# Patient Record
Sex: MALE | Race: WHITE | NOT HISPANIC OR LATINO | Employment: FULL TIME | URBAN - METROPOLITAN AREA
[De-identification: names, ages, dates, MRNs, and addresses within clinical notes are randomized per-mention and may not be internally consistent; named-entity substitution may affect disease eponyms.]

---

## 2017-02-28 DIAGNOSIS — N40.1 BENIGN PROSTATIC HYPERPLASIA WITH URINARY OBSTRUCTION: ICD-10-CM

## 2017-02-28 DIAGNOSIS — E78.5 DYSLIPIDEMIA: Primary | ICD-10-CM

## 2017-02-28 DIAGNOSIS — N13.8 BENIGN PROSTATIC HYPERPLASIA WITH URINARY OBSTRUCTION: ICD-10-CM

## 2017-02-28 DIAGNOSIS — F34.1 DYSTHYMIA: ICD-10-CM

## 2017-02-28 LAB
CHOLEST SERPL-MCNC: 209 MG/DL (ref 0–200)
CHOLEST/HDLC SERPL: 4.02 {RATIO}
HDLC SERPL-MCNC: 52 MG/DL (ref 40–60)
LDLC SERPL CALC-MCNC: 114 MG/DL (ref 0–100)
TRIGL SERPL-MCNC: 215 MG/DL (ref 0–150)
VLDLC SERPL CALC-MCNC: 43 MG/DL (ref 5–40)

## 2017-04-15 DIAGNOSIS — F34.1 DYSTHYMIA: ICD-10-CM

## 2017-04-15 DIAGNOSIS — G47.00 INSOMNIA, UNSPECIFIED TYPE: ICD-10-CM

## 2017-04-17 RX ORDER — FLUOXETINE HYDROCHLORIDE 20 MG/1
CAPSULE ORAL
Qty: 90 CAPSULE | Refills: 0 | Status: SHIPPED | OUTPATIENT
Start: 2017-04-17 | End: 2017-07-16 | Stop reason: SDUPTHER

## 2017-04-17 RX ORDER — TRAZODONE HYDROCHLORIDE 50 MG/1
TABLET ORAL
Qty: 90 TABLET | Refills: 0 | Status: SHIPPED | OUTPATIENT
Start: 2017-04-17 | End: 2017-07-16 | Stop reason: SDUPTHER

## 2017-04-26 ENCOUNTER — TELEPHONE (OUTPATIENT)
Dept: SPORTS MEDICINE | Facility: CLINIC | Age: 47
End: 2017-04-26

## 2017-05-26 ENCOUNTER — OFFICE VISIT (OUTPATIENT)
Dept: SPORTS MEDICINE | Facility: CLINIC | Age: 47
End: 2017-05-26

## 2017-05-26 VITALS
SYSTOLIC BLOOD PRESSURE: 140 MMHG | OXYGEN SATURATION: 98 % | BODY MASS INDEX: 25.77 KG/M2 | HEIGHT: 69 IN | HEART RATE: 64 BPM | DIASTOLIC BLOOD PRESSURE: 92 MMHG | WEIGHT: 174 LBS

## 2017-05-26 DIAGNOSIS — R03.0 ELEVATED BLOOD PRESSURE (NOT HYPERTENSION): ICD-10-CM

## 2017-05-26 DIAGNOSIS — G47.00 INSOMNIA, UNSPECIFIED TYPE: ICD-10-CM

## 2017-05-26 DIAGNOSIS — E78.5 DYSLIPIDEMIA: Primary | ICD-10-CM

## 2017-05-26 DIAGNOSIS — F34.1 DYSTHYMIA: ICD-10-CM

## 2017-05-26 PROCEDURE — 99214 OFFICE O/P EST MOD 30 MIN: CPT | Performed by: FAMILY MEDICINE

## 2017-05-26 RX ORDER — ROSUVASTATIN CALCIUM 20 MG/1
20 TABLET, COATED ORAL DAILY
Qty: 90 TABLET | Refills: 1 | Status: SHIPPED | OUTPATIENT
Start: 2017-05-26 | End: 2018-02-01 | Stop reason: SDUPTHER

## 2017-07-16 DIAGNOSIS — G47.00 INSOMNIA, UNSPECIFIED TYPE: ICD-10-CM

## 2017-07-16 DIAGNOSIS — F34.1 DYSTHYMIA: ICD-10-CM

## 2017-07-17 RX ORDER — TRAZODONE HYDROCHLORIDE 50 MG/1
TABLET ORAL
Qty: 90 TABLET | Refills: 0 | Status: SHIPPED | OUTPATIENT
Start: 2017-07-17 | End: 2017-10-15 | Stop reason: SDUPTHER

## 2017-07-17 RX ORDER — FLUOXETINE HYDROCHLORIDE 20 MG/1
CAPSULE ORAL
Qty: 90 CAPSULE | Refills: 0 | Status: SHIPPED | OUTPATIENT
Start: 2017-07-17 | End: 2017-10-15 | Stop reason: SDUPTHER

## 2017-10-11 ENCOUNTER — OFFICE VISIT (OUTPATIENT)
Dept: SPORTS MEDICINE | Facility: CLINIC | Age: 47
End: 2017-10-11

## 2017-10-11 VITALS
HEART RATE: 58 BPM | WEIGHT: 181 LBS | TEMPERATURE: 98.5 F | HEIGHT: 69 IN | BODY MASS INDEX: 26.81 KG/M2 | SYSTOLIC BLOOD PRESSURE: 140 MMHG | DIASTOLIC BLOOD PRESSURE: 98 MMHG | OXYGEN SATURATION: 98 %

## 2017-10-11 DIAGNOSIS — R68.89 FLU-LIKE SYMPTOMS: Primary | ICD-10-CM

## 2017-10-11 DIAGNOSIS — J06.9 VIRAL URI: ICD-10-CM

## 2017-10-11 LAB
EXPIRATION DATE: NORMAL
FLUAV AG NPH QL: NORMAL
FLUBV AG NPH QL: NORMAL
INTERNAL CONTROL: NORMAL
Lab: NORMAL

## 2017-10-11 PROCEDURE — 87804 INFLUENZA ASSAY W/OPTIC: CPT | Performed by: FAMILY MEDICINE

## 2017-10-11 PROCEDURE — 99213 OFFICE O/P EST LOW 20 MIN: CPT | Performed by: FAMILY MEDICINE

## 2017-10-11 RX ORDER — TRAZODONE HYDROCHLORIDE 50 MG/1
50 TABLET ORAL
COMMUNITY
End: 2017-10-11

## 2017-10-11 NOTE — PROGRESS NOTES
"Pedro is a 46 y.o. year old male    Chief Complaint   Patient presents with   • Sore Throat     x4 days   • Generalized Body Aches   • Nausea   • Cough   • Fatigue       History of Present Illness   URI    This is a new problem. The current episode started in the past 7 days. The problem has been gradually improving. There has been no fever. Associated symptoms include congestion, coughing, nausea, rhinorrhea and a sore throat. Pertinent negatives include no abdominal pain, chest pain, headaches or rash. He has tried decongestant and acetaminophen for the symptoms. The treatment provided mild relief.        I have reviewed the patient's medical, family, and social history in detail and updated the computerized patient record.    Review of Systems   Constitutional: Negative for chills, fatigue and fever.   HENT: Positive for congestion, rhinorrhea and sore throat. Negative for sinus pressure.    Respiratory: Positive for cough. Negative for chest tightness and shortness of breath.    Cardiovascular: Negative for chest pain.   Gastrointestinal: Positive for nausea. Negative for abdominal pain.   Musculoskeletal: Negative for arthralgias.   Skin: Negative for rash.   Neurological: Negative for numbness and headaches.   All other systems reviewed and are negative.      /98  Pulse 58  Temp 98.5 °F (36.9 °C)  Ht 69\" (175.3 cm)  Wt 181 lb (82.1 kg)  SpO2 98%  BMI 26.73 kg/m2     Physical Exam   Constitutional: He is oriented to person, place, and time. He appears well-developed and well-nourished.   HENT:   Head: Normocephalic and atraumatic.   Right Ear: External ear normal.   Left Ear: External ear normal.   Nose: Nose normal.   Mouth/Throat: Oropharynx is clear and moist.   Eyes: EOM are normal.   Neck: Normal range of motion.   Cardiovascular: Normal rate and regular rhythm.    Pulmonary/Chest: Effort normal and breath sounds normal.   Neurological: He is alert and oriented to person, place, and time. "   Skin: Skin is warm and dry. No rash noted.   Psychiatric: He has a normal mood and affect. His behavior is normal.   Nursing note and vitals reviewed.       Diagnoses and all orders for this visit:    Flu-like symptoms    Viral URI    Other orders  -     Discontinue: traZODone (DESYREL) 50 MG tablet; Take 50 mg by mouth.      Discussed with patient that symptoms likely related to viral URI. Explained that antibiotics are not indicated at this time and to continue supportive measures. Keep hydrating. Can take OTC acetaminophen and Ibuprofen. If symptoms acutely worsen or persist past 7 days, return to office.    EMR Dragon/Transcription disclaimer:    Much of this encounter note is an electronic transcription/translation of spoken language to printed text.  The electronic translation of spoken language may permit erroneous, or at times, nonsensical words or phrases to be inadvertently transcribed.  Although I have reviewed the note for such errors some may still exist.

## 2017-10-15 DIAGNOSIS — G47.00 INSOMNIA, UNSPECIFIED TYPE: ICD-10-CM

## 2017-10-15 DIAGNOSIS — F34.1 DYSTHYMIA: ICD-10-CM

## 2017-10-16 RX ORDER — FLUOXETINE HYDROCHLORIDE 20 MG/1
CAPSULE ORAL
Qty: 90 CAPSULE | Refills: 0 | Status: SHIPPED | OUTPATIENT
Start: 2017-10-16 | End: 2018-01-14 | Stop reason: SDUPTHER

## 2017-10-16 RX ORDER — TRAZODONE HYDROCHLORIDE 50 MG/1
TABLET ORAL
Qty: 90 TABLET | Refills: 0 | Status: SHIPPED | OUTPATIENT
Start: 2017-10-16 | End: 2018-01-14 | Stop reason: SDUPTHER

## 2017-11-21 ENCOUNTER — OFFICE VISIT (OUTPATIENT)
Dept: SPORTS MEDICINE | Facility: CLINIC | Age: 47
End: 2017-11-21

## 2017-11-21 VITALS
WEIGHT: 179 LBS | OXYGEN SATURATION: 98 % | DIASTOLIC BLOOD PRESSURE: 64 MMHG | SYSTOLIC BLOOD PRESSURE: 116 MMHG | BODY MASS INDEX: 26.43 KG/M2 | HEART RATE: 71 BPM | TEMPERATURE: 98.2 F

## 2017-11-21 DIAGNOSIS — J01.00 SUBACUTE MAXILLARY SINUSITIS: Primary | ICD-10-CM

## 2017-11-21 DIAGNOSIS — Z00.00 PREVENTATIVE HEALTH CARE: Primary | ICD-10-CM

## 2017-11-21 DIAGNOSIS — Z00.00 PREVENTATIVE HEALTH CARE: ICD-10-CM

## 2017-11-21 DIAGNOSIS — R05.9 COUGH: ICD-10-CM

## 2017-11-21 LAB
ALBUMIN SERPL-MCNC: 4.6 G/DL (ref 3.5–5.2)
ALBUMIN/GLOB SERPL: 1.9 G/DL
ALP SERPL-CCNC: 53 U/L (ref 39–117)
ALT SERPL-CCNC: 49 U/L (ref 1–41)
APPEARANCE UR: CLEAR
AST SERPL-CCNC: 43 U/L (ref 1–40)
BILIRUB SERPL-MCNC: 0.3 MG/DL (ref 0.1–1.2)
BILIRUB UR QL STRIP: NEGATIVE
BUN SERPL-MCNC: 14 MG/DL (ref 6–20)
BUN/CREAT SERPL: 14.1 (ref 7–25)
CALCIUM SERPL-MCNC: 9.7 MG/DL (ref 8.6–10.5)
CHLORIDE SERPL-SCNC: 100 MMOL/L (ref 98–107)
CHOLEST SERPL-MCNC: 204 MG/DL (ref 0–200)
CHOLEST/HDLC SERPL: 3.52 {RATIO}
CO2 SERPL-SCNC: 26.9 MMOL/L (ref 22–29)
COLOR UR: YELLOW
CREAT SERPL-MCNC: 0.99 MG/DL (ref 0.76–1.27)
GFR SERPLBLD CREATININE-BSD FMLA CKD-EPI: 100 ML/MIN/1.73
GFR SERPLBLD CREATININE-BSD FMLA CKD-EPI: 82 ML/MIN/1.73
GLOBULIN SER CALC-MCNC: 2.4 GM/DL
GLUCOSE SERPL-MCNC: 105 MG/DL (ref 65–99)
GLUCOSE UR QL: NEGATIVE
HDLC SERPL-MCNC: 58 MG/DL (ref 40–60)
HGB UR QL STRIP: NEGATIVE
KETONES UR QL STRIP: NEGATIVE
LDLC SERPL CALC-MCNC: 128 MG/DL (ref 0–100)
LEUKOCYTE ESTERASE UR QL STRIP: NEGATIVE
NITRITE UR QL STRIP: NEGATIVE
PH UR STRIP: 7.5 [PH] (ref 5–8)
POTASSIUM SERPL-SCNC: 4.7 MMOL/L (ref 3.5–5.2)
PROT SERPL-MCNC: 7 G/DL (ref 6–8.5)
PROT UR QL STRIP: NEGATIVE
SODIUM SERPL-SCNC: 141 MMOL/L (ref 136–145)
SP GR UR: 1.01 (ref 1–1.03)
TRIGL SERPL-MCNC: 88 MG/DL (ref 0–150)
UROBILINOGEN UR STRIP-MCNC: NORMAL MG/DL
VLDLC SERPL CALC-MCNC: 17.6 MG/DL (ref 5–40)

## 2017-11-21 PROCEDURE — 71020 XR CHEST 2 VW: CPT | Performed by: FAMILY MEDICINE

## 2017-11-21 PROCEDURE — 99214 OFFICE O/P EST MOD 30 MIN: CPT | Performed by: FAMILY MEDICINE

## 2017-11-21 RX ORDER — METHYLPREDNISOLONE 4 MG/1
TABLET ORAL
Qty: 21 TABLET | Refills: 0 | Status: SHIPPED | OUTPATIENT
Start: 2017-11-21 | End: 2017-12-01

## 2017-11-21 RX ORDER — AMOXICILLIN AND CLAVULANATE POTASSIUM 875; 125 MG/1; MG/1
1 TABLET, FILM COATED ORAL 2 TIMES DAILY
Qty: 20 TABLET | Refills: 0 | Status: SHIPPED | OUTPATIENT
Start: 2017-11-21 | End: 2017-12-01

## 2017-11-21 RX ORDER — DEXTROMETHORPHAN HYDROBROMIDE AND PROMETHAZINE HYDROCHLORIDE 15; 6.25 MG/5ML; MG/5ML
5 SYRUP ORAL 4 TIMES DAILY PRN
Qty: 180 ML | Refills: 0 | Status: SHIPPED | OUTPATIENT
Start: 2017-11-21 | End: 2017-12-01

## 2017-11-21 NOTE — PROGRESS NOTES
Pedro is a 46 y.o. year old male    Chief Complaint   Patient presents with   • Cough     x1 month   • Breathing Problem       History of Present Illness   URI    This is a new problem. The current episode started more than 1 month ago. The problem has been unchanged. There has been no fever. Associated symptoms include congestion, coughing, sinus pain and a sore throat. Pertinent negatives include no abdominal pain, chest pain, headaches, rash or rhinorrhea. He has tried decongestant and NSAIDs (previous visit to  received ABX, cough Rx) for the symptoms. The treatment provided mild relief.        I have reviewed the patient's medical, family, and social history in detail and updated the computerized patient record.    Review of Systems   Constitutional: Negative for chills, fatigue and fever.   HENT: Positive for congestion, sinus pain and sore throat. Negative for rhinorrhea and sinus pressure.    Respiratory: Positive for cough. Negative for chest tightness and shortness of breath.    Cardiovascular: Negative for chest pain.   Gastrointestinal: Negative for abdominal pain.   Musculoskeletal: Negative for arthralgias.   Skin: Negative for rash.   Neurological: Negative for numbness and headaches.   All other systems reviewed and are negative.      /64  Pulse 71  Temp 98.2 °F (36.8 °C)  Wt 179 lb (81.2 kg)  SpO2 98%  BMI 26.43 kg/m2     Physical Exam   Constitutional: He is oriented to person, place, and time. He appears well-developed and well-nourished.   HENT:   Head: Normocephalic and atraumatic.   Right Ear: Hearing, tympanic membrane and external ear normal.   Left Ear: Hearing, tympanic membrane and external ear normal.   Nose: No mucosal edema or rhinorrhea. Right sinus exhibits maxillary sinus tenderness. Left sinus exhibits maxillary sinus tenderness.   Mouth/Throat: Oropharynx is clear and moist. No posterior oropharyngeal erythema. Tonsils are 0 on the right. Tonsils are 0 on the left. No  tonsillar exudate.   Eyes: Conjunctivae and EOM are normal.   Neck: Normal range of motion.   Cardiovascular: Normal rate and normal heart sounds.    Pulmonary/Chest: Effort normal and breath sounds normal.   Lymphadenopathy:     He has no cervical adenopathy.   Neurological: He is alert and oriented to person, place, and time.   Skin: Skin is warm and dry.   Psychiatric: He has a normal mood and affect. His behavior is normal.   Nursing note and vitals reviewed.    Chest X-Ray  Indication: Cough  Views: PA and Lateral    Findings:  Normal lung markings.  No pleural effusion.  Heart size and shape are normal.  Mediastinum is normal.  No visible rib fractures.   Overall, normal chest.    There were no previous studies available for comparison.       Diagnoses and all orders for this visit:    Subacute maxillary sinusitis  -     amoxicillin-clavulanate (AUGMENTIN) 875-125 MG per tablet; Take 1 tablet by mouth 2 (Two) Times a Day for 10 days.  -     promethazine-dextromethorphan (PROMETHAZINE-DM) 6.25-15 MG/5ML syrup; Take 5 mL by mouth 4 (Four) Times a Day As Needed for Cough.  -     MethylPREDNISolone (MEDROL, LUCIA,) 4 MG tablet; Take as directed on package instructions.    Cough  -     XR Chest 2 View    Preventative health care  -     Lipid Panel With / Chol / HDL Ratio  -     Comprehensive Metabolic Panel  -     Urinalysis With / Microscopic If Indicated - Urine, Clean Catch          EMR Dragon/Transcription disclaimer:    Much of this encounter note is an electronic transcription/translation of spoken language to printed text.  The electronic translation of spoken language may permit erroneous, or at times, nonsensical words or phrases to be inadvertently transcribed.  Although I have reviewed the note for such errors some may still exist.

## 2017-12-01 ENCOUNTER — OFFICE VISIT (OUTPATIENT)
Dept: SPORTS MEDICINE | Facility: CLINIC | Age: 47
End: 2017-12-01

## 2017-12-01 VITALS
BODY MASS INDEX: 26.51 KG/M2 | RESPIRATION RATE: 16 BRPM | WEIGHT: 179 LBS | HEART RATE: 63 BPM | OXYGEN SATURATION: 98 % | SYSTOLIC BLOOD PRESSURE: 130 MMHG | HEIGHT: 69 IN | DIASTOLIC BLOOD PRESSURE: 84 MMHG

## 2017-12-01 DIAGNOSIS — G47.00 INSOMNIA, UNSPECIFIED TYPE: ICD-10-CM

## 2017-12-01 DIAGNOSIS — F34.1 DYSTHYMIA: ICD-10-CM

## 2017-12-01 DIAGNOSIS — E78.5 DYSLIPIDEMIA: ICD-10-CM

## 2017-12-01 DIAGNOSIS — Z00.00 ANNUAL PHYSICAL EXAM: Primary | ICD-10-CM

## 2017-12-01 DIAGNOSIS — Z23 FLU VACCINE NEED: ICD-10-CM

## 2017-12-01 DIAGNOSIS — Z23 NEED FOR TDAP VACCINATION: ICD-10-CM

## 2017-12-01 PROCEDURE — 90472 IMMUNIZATION ADMIN EACH ADD: CPT | Performed by: FAMILY MEDICINE

## 2017-12-01 PROCEDURE — 90471 IMMUNIZATION ADMIN: CPT | Performed by: FAMILY MEDICINE

## 2017-12-01 PROCEDURE — 99396 PREV VISIT EST AGE 40-64: CPT | Performed by: FAMILY MEDICINE

## 2017-12-01 PROCEDURE — 90686 IIV4 VACC NO PRSV 0.5 ML IM: CPT | Performed by: FAMILY MEDICINE

## 2017-12-01 PROCEDURE — 90715 TDAP VACCINE 7 YRS/> IM: CPT | Performed by: FAMILY MEDICINE

## 2017-12-01 NOTE — PROGRESS NOTES
"Pedro Katz is here today for an annual physical exam.     Eating a healthy diet. Exercising routinely.     I have reviewed the patient's medical, family, and social history in detail and updated the computerized patient record.    Screening history:  Colonoscopy - no fam hx  Prostate - no fam hx   Metabolic - last year    Health Maintenance   Topic Date Due   • TDAP/TD VACCINES (1 - Tdap) 12/28/1991   • INFLUENZA VACCINE  08/01/2017   • LIPID PANEL  11/21/2018       Review of Systems   Constitutional: Negative for chills, fatigue and fever.   HENT: Negative for postnasal drip and sore throat.    Eyes: Negative for pain.   Respiratory: Negative for cough, chest tightness and wheezing.    Cardiovascular: Negative for chest pain.   Gastrointestinal: Negative.    Musculoskeletal: Negative for myalgias.   Skin: Negative for rash.   Neurological: Negative for numbness and headaches.   Psychiatric/Behavioral: Negative.    All other systems reviewed and are negative.      /84 (BP Location: Right arm, Patient Position: Sitting, Cuff Size: Adult)  Pulse 63  Resp 16  Ht 69\" (175.3 cm)  Wt 179 lb (81.2 kg)  SpO2 98%  BMI 26.43 kg/m2     Physical Exam    Vital signs reviewed.  General appearance: No acute distress  Eyes: conjunctiva clear without erythema; pupils equally round and reactive  ENT: external ears and nose normal; hearing normal, oropharynx clear  Neck: supple; no thyromegaly  CV: normal rate and rhythm; no peripheral edema  Respiratory: normal respiratory effort; lungs clear to auscultation bilaterally  MSK: normal gait and station; no focal joint deformity or swelling  Skin: no rash or wounds; normal turgor  Neuro: cranial nerves 2-12 grossly intact; normal sensation to light touch  Psych: mood and affect normal; recent and remote memory intact    Office Visit on 11/21/2017   Component Date Value Ref Range Status   • Total Cholesterol 11/21/2017 204* 0 - 200 mg/dL Final   • Triglycerides 11/21/2017 " 88  0 - 150 mg/dL Final   • HDL Cholesterol 11/21/2017 58  40 - 60 mg/dL Final   • VLDL Cholesterol 11/21/2017 17.6  5 - 40 mg/dL Final   • LDL Cholesterol  11/21/2017 128* 0 - 100 mg/dL Final   • Chol/HDL Ratio 11/21/2017 3.52   Final   • Glucose 11/21/2017 105* 65 - 99 mg/dL Final   • BUN 11/21/2017 14  6 - 20 mg/dL Final   • Creatinine 11/21/2017 0.99  0.76 - 1.27 mg/dL Final   • eGFR Non  Am 11/21/2017 82  >60 mL/min/1.73 Final   • eGFR African Am 11/21/2017 100  >60 mL/min/1.73 Final   • BUN/Creatinine Ratio 11/21/2017 14.1  7.0 - 25.0 Final   • Sodium 11/21/2017 141  136 - 145 mmol/L Final   • Potassium 11/21/2017 4.7  3.5 - 5.2 mmol/L Final   • Chloride 11/21/2017 100  98 - 107 mmol/L Final   • Total CO2 11/21/2017 26.9  22.0 - 29.0 mmol/L Final   • Calcium 11/21/2017 9.7  8.6 - 10.5 mg/dL Final   • Total Protein 11/21/2017 7.0  6.0 - 8.5 g/dL Final   • Albumin 11/21/2017 4.60  3.50 - 5.20 g/dL Final   • Globulin 11/21/2017 2.4  gm/dL Final   • A/G Ratio 11/21/2017 1.9  g/dL Final   • Total Bilirubin 11/21/2017 0.3  0.1 - 1.2 mg/dL Final   • Alkaline Phosphatase 11/21/2017 53  39 - 117 U/L Final   • AST (SGOT) 11/21/2017 43* 1 - 40 U/L Final   • ALT (SGPT) 11/21/2017 49* 1 - 41 U/L Final   • Specific Gravity, UA 11/21/2017 1.015  1.005 - 1.030 Final   • pH, UA 11/21/2017 7.5  5.0 - 8.0 Final   • Color, UA 11/21/2017 Yellow   Final    Comment: Urine microscopic not indicated.  REFERENCE RANGE: Yellow, Straw     • Appearance, UA 11/21/2017 Clear  Clear Final   • Leukocytes, UA 11/21/2017 Negative  Negative Final   • Protein 11/21/2017 Negative  Negative Final   • Glucose, UA 11/21/2017 Negative  Negative Final   • Ketones 11/21/2017 Negative  Negative Final   • Blood, UA 11/21/2017 Negative  Negative Final   • Bilirubin, UA 11/21/2017 Negative  Negative Final   • Urobilinogen, UA 11/21/2017 Comment   Final    Comment: 0.2 E.U./dL  REFERENCE RANGE: 0.2 - 1.0 E.U./dL     • Nitrite, UA 11/21/2017 Negative   Negative Final        Pedro was seen today for annual exam.    Diagnoses and all orders for this visit:    Annual physical exam    Flu vaccine need  -     Flu Vaccine Quad PF 3YR+    Need for Tdap vaccination  -     Tdap Vaccine Greater Than or Equal To 6yo IM    Dysthymia    Insomnia, unspecified type    Dyslipidemia        Encourage healthy diet and exercise.  Encourage patient to stay up to date on screening examinations as indicated based on age and risk factors.    EMR Dragon/Transcription disclaimer:    Much of this encounter note is an electronic transcription/translation of spoken language to printed text.  The electronic translation of spoken language may permit erroneous, or at times, nonsensical words or phrases to be inadvertently transcribed.  Although I have reviewed the note for such errors some may still exist.

## 2018-01-14 DIAGNOSIS — F34.1 DYSTHYMIA: ICD-10-CM

## 2018-01-14 DIAGNOSIS — G47.00 INSOMNIA, UNSPECIFIED TYPE: ICD-10-CM

## 2018-01-15 RX ORDER — FLUOXETINE HYDROCHLORIDE 20 MG/1
CAPSULE ORAL
Qty: 90 CAPSULE | Refills: 0 | Status: SHIPPED | OUTPATIENT
Start: 2018-01-15 | End: 2018-04-15 | Stop reason: SDUPTHER

## 2018-01-15 RX ORDER — TRAZODONE HYDROCHLORIDE 50 MG/1
TABLET ORAL
Qty: 90 TABLET | Refills: 0 | Status: SHIPPED | OUTPATIENT
Start: 2018-01-15 | End: 2018-04-15 | Stop reason: SDUPTHER

## 2018-02-01 DIAGNOSIS — E78.5 DYSLIPIDEMIA: ICD-10-CM

## 2018-02-01 RX ORDER — ROSUVASTATIN CALCIUM 20 MG/1
TABLET, COATED ORAL
Qty: 90 TABLET | Refills: 1 | Status: SHIPPED | OUTPATIENT
Start: 2018-02-01 | End: 2018-07-31 | Stop reason: SDUPTHER

## 2018-04-15 DIAGNOSIS — G47.00 INSOMNIA, UNSPECIFIED TYPE: ICD-10-CM

## 2018-04-15 DIAGNOSIS — F34.1 DYSTHYMIA: ICD-10-CM

## 2018-04-16 RX ORDER — FLUOXETINE HYDROCHLORIDE 20 MG/1
CAPSULE ORAL
Qty: 90 CAPSULE | Refills: 0 | Status: SHIPPED | OUTPATIENT
Start: 2018-04-16 | End: 2018-07-15 | Stop reason: SDUPTHER

## 2018-04-16 RX ORDER — TRAZODONE HYDROCHLORIDE 50 MG/1
TABLET ORAL
Qty: 90 TABLET | Refills: 0 | Status: SHIPPED | OUTPATIENT
Start: 2018-04-16 | End: 2018-07-15 | Stop reason: SDUPTHER

## 2018-07-15 DIAGNOSIS — G47.00 INSOMNIA, UNSPECIFIED TYPE: ICD-10-CM

## 2018-07-15 DIAGNOSIS — F34.1 DYSTHYMIA: ICD-10-CM

## 2018-07-16 RX ORDER — TRAZODONE HYDROCHLORIDE 50 MG/1
TABLET ORAL
Qty: 90 TABLET | Refills: 0 | Status: SHIPPED | OUTPATIENT
Start: 2018-07-16 | End: 2018-10-14 | Stop reason: SDUPTHER

## 2018-07-16 RX ORDER — FLUOXETINE HYDROCHLORIDE 20 MG/1
CAPSULE ORAL
Qty: 90 CAPSULE | Refills: 0 | Status: SHIPPED | OUTPATIENT
Start: 2018-07-16 | End: 2019-02-13 | Stop reason: SDUPTHER

## 2018-07-31 DIAGNOSIS — E78.5 DYSLIPIDEMIA: ICD-10-CM

## 2018-07-31 RX ORDER — ROSUVASTATIN CALCIUM 20 MG/1
TABLET, COATED ORAL
Qty: 90 TABLET | Refills: 1 | Status: SHIPPED | OUTPATIENT
Start: 2018-07-31 | End: 2019-01-27 | Stop reason: SDUPTHER

## 2018-10-14 DIAGNOSIS — G47.00 INSOMNIA, UNSPECIFIED TYPE: ICD-10-CM

## 2018-10-15 RX ORDER — TRAZODONE HYDROCHLORIDE 50 MG/1
TABLET ORAL
Qty: 90 TABLET | Refills: 0 | Status: SHIPPED | OUTPATIENT
Start: 2018-10-15 | End: 2019-01-10 | Stop reason: SDUPTHER

## 2018-12-11 ENCOUNTER — OFFICE VISIT (OUTPATIENT)
Dept: SPORTS MEDICINE | Facility: CLINIC | Age: 48
End: 2018-12-11

## 2018-12-11 VITALS
HEIGHT: 69 IN | WEIGHT: 186 LBS | OXYGEN SATURATION: 97 % | SYSTOLIC BLOOD PRESSURE: 128 MMHG | TEMPERATURE: 97.9 F | DIASTOLIC BLOOD PRESSURE: 80 MMHG | HEART RATE: 60 BPM | BODY MASS INDEX: 27.55 KG/M2

## 2018-12-11 DIAGNOSIS — J01.00 ACUTE NON-RECURRENT MAXILLARY SINUSITIS: Primary | ICD-10-CM

## 2018-12-11 DIAGNOSIS — R14.0 BLOATING: ICD-10-CM

## 2018-12-11 PROCEDURE — 99214 OFFICE O/P EST MOD 30 MIN: CPT | Performed by: FAMILY MEDICINE

## 2018-12-11 RX ORDER — AMOXICILLIN AND CLAVULANATE POTASSIUM 875; 125 MG/1; MG/1
1 TABLET, FILM COATED ORAL 2 TIMES DAILY
Qty: 20 TABLET | Refills: 0 | Status: SHIPPED | OUTPATIENT
Start: 2018-12-11 | End: 2018-12-21

## 2018-12-11 NOTE — PROGRESS NOTES
"Pedro is a 47 y.o. year old male    Chief Complaint   Patient presents with   • Cough     Fatigue, Bloated, Sinus // x 5-6 Days        History of Present Illness   Sinusitis   This is a new problem. The current episode started in the past 7 days. The problem is unchanged. There has been no fever. Associated symptoms include congestion, coughing, ear pain and sinus pressure. Pertinent negatives include no chills, headaches or sore throat.        I have reviewed the patient's medical, family, and social history in detail and updated the computerized patient record.    Review of Systems   Constitutional: Negative for chills, fatigue and fever.   HENT: Positive for congestion, ear pain and sinus pressure. Negative for postnasal drip and sore throat.    Eyes: Negative for pain.   Respiratory: Positive for cough. Negative for chest tightness and wheezing.    Cardiovascular: Negative for chest pain.   Gastrointestinal: Negative.    Musculoskeletal: Negative for myalgias.   Skin: Negative for rash.   Neurological: Negative for numbness and headaches.   Psychiatric/Behavioral: Negative.    All other systems reviewed and are negative.      /80   Pulse 60   Temp 97.9 °F (36.6 °C)   Ht 175.3 cm (69.02\")   Wt 84.4 kg (186 lb)   SpO2 97%   BMI 27.45 kg/m²      Physical Exam   Constitutional: He is oriented to person, place, and time. He appears well-developed and well-nourished.   HENT:   Head: Normocephalic and atraumatic.   Right Ear: Hearing, tympanic membrane and external ear normal.   Left Ear: Hearing, tympanic membrane and external ear normal.   Nose: No mucosal edema or rhinorrhea. Right sinus exhibits maxillary sinus tenderness. Left sinus exhibits maxillary sinus tenderness.   Mouth/Throat: Oropharynx is clear and moist. No posterior oropharyngeal erythema. Tonsils are 0 on the right. Tonsils are 0 on the left. No tonsillar exudate.   Eyes: Conjunctivae and EOM are normal.   Neck: Normal range of motion. "   Cardiovascular: Normal rate and normal heart sounds.   Pulmonary/Chest: Effort normal and breath sounds normal.   Lymphadenopathy:     He has no cervical adenopathy.   Neurological: He is alert and oriented to person, place, and time.   Skin: Skin is warm and dry.   Psychiatric: He has a normal mood and affect. His behavior is normal.   Nursing note and vitals reviewed.       Diagnoses and all orders for this visit:    Acute non-recurrent maxillary sinusitis  -     amoxicillin-clavulanate (AUGMENTIN) 875-125 MG per tablet; Take 1 tablet by mouth 2 (Two) Times a Day for 10 days.    Bloating       Can try daily Colace for bloating symptoms as he is having incomplete emptying.    EMR Dragon/Transcription disclaimer:    Much of this encounter note is an electronic transcription/translation of spoken language to printed text.  The electronic translation of spoken language may permit erroneous, or at times, nonsensical words or phrases to be inadvertently transcribed.  Although I have reviewed the note for such errors some may still exist.

## 2019-01-10 DIAGNOSIS — G47.00 INSOMNIA, UNSPECIFIED TYPE: ICD-10-CM

## 2019-01-10 RX ORDER — TRAZODONE HYDROCHLORIDE 50 MG/1
50 TABLET ORAL NIGHTLY
Qty: 30 TABLET | Refills: 0 | Status: SHIPPED | OUTPATIENT
Start: 2019-01-10 | End: 2019-02-13 | Stop reason: SDUPTHER

## 2019-01-13 DIAGNOSIS — G47.00 INSOMNIA, UNSPECIFIED TYPE: ICD-10-CM

## 2019-01-14 RX ORDER — TRAZODONE HYDROCHLORIDE 50 MG/1
TABLET ORAL
Qty: 90 TABLET | Refills: 0 | OUTPATIENT
Start: 2019-01-14

## 2019-01-27 DIAGNOSIS — E78.5 DYSLIPIDEMIA: ICD-10-CM

## 2019-01-29 RX ORDER — ROSUVASTATIN CALCIUM 20 MG/1
TABLET, COATED ORAL
Qty: 90 TABLET | Refills: 1 | Status: SHIPPED | OUTPATIENT
Start: 2019-01-29 | End: 2019-02-13 | Stop reason: SDUPTHER

## 2019-02-07 DIAGNOSIS — G47.00 INSOMNIA, UNSPECIFIED TYPE: ICD-10-CM

## 2019-02-07 RX ORDER — TRAZODONE HYDROCHLORIDE 50 MG/1
50 TABLET ORAL NIGHTLY
Qty: 30 TABLET | Refills: 0 | OUTPATIENT
Start: 2019-02-07

## 2019-02-13 ENCOUNTER — OFFICE VISIT (OUTPATIENT)
Dept: SPORTS MEDICINE | Facility: CLINIC | Age: 49
End: 2019-02-13

## 2019-02-13 VITALS
WEIGHT: 186 LBS | HEIGHT: 69 IN | SYSTOLIC BLOOD PRESSURE: 118 MMHG | BODY MASS INDEX: 27.55 KG/M2 | DIASTOLIC BLOOD PRESSURE: 78 MMHG

## 2019-02-13 DIAGNOSIS — F34.1 DYSTHYMIA: ICD-10-CM

## 2019-02-13 DIAGNOSIS — G47.00 INSOMNIA, UNSPECIFIED TYPE: ICD-10-CM

## 2019-02-13 DIAGNOSIS — E78.5 DYSLIPIDEMIA: Primary | ICD-10-CM

## 2019-02-13 PROCEDURE — 99214 OFFICE O/P EST MOD 30 MIN: CPT | Performed by: FAMILY MEDICINE

## 2019-02-13 RX ORDER — FLUOXETINE HYDROCHLORIDE 20 MG/1
20 CAPSULE ORAL DAILY
Qty: 90 CAPSULE | Refills: 1 | Status: SHIPPED | OUTPATIENT
Start: 2019-02-13 | End: 2020-03-31

## 2019-02-13 RX ORDER — ROSUVASTATIN CALCIUM 20 MG/1
20 TABLET, COATED ORAL DAILY
Qty: 90 TABLET | Refills: 1 | Status: SHIPPED | OUTPATIENT
Start: 2019-02-13 | End: 2020-01-21

## 2019-02-13 RX ORDER — TRAZODONE HYDROCHLORIDE 50 MG/1
50 TABLET ORAL NIGHTLY
Qty: 90 TABLET | Refills: 1 | Status: SHIPPED | OUTPATIENT
Start: 2019-02-13 | End: 2019-08-09 | Stop reason: SDUPTHER

## 2019-02-13 NOTE — PROGRESS NOTES
"Pedro is a 48 y.o. year old male    Chief Complaint   Patient presents with   • Med Refill       History of Present Illness   HPI     HLD, depression, insomnia f/up. No acute changes to conditions. Does associate sleeplessness when he doesn't take trazodone.     Declines flu shot.    I have reviewed the patient's medical, family, and social history in detail and updated the computerized patient record.    Review of Systems   Constitutional: Negative for chills, fatigue and fever.   HENT: Negative for congestion, rhinorrhea and sinus pressure.    Respiratory: Negative for chest tightness and shortness of breath.    Cardiovascular: Negative for chest pain.   Gastrointestinal: Negative for abdominal pain.   Musculoskeletal: Negative for arthralgias.   Skin: Negative for rash.   Neurological: Negative for numbness and headaches.   All other systems reviewed and are negative.      /78   Ht 175.3 cm (69\")   Wt 84.4 kg (186 lb)   BMI 27.47 kg/m²      Physical Exam   Constitutional: He is oriented to person, place, and time. Vital signs are normal. He appears well-developed and well-nourished.   HENT:   Head: Normocephalic and atraumatic.   Eyes: Conjunctivae and EOM are normal.   Pulmonary/Chest: No accessory muscle usage. No respiratory distress.   Musculoskeletal: He exhibits no deformity.   Neurological: He is alert and oriented to person, place, and time.   Skin: Skin is warm and dry. No rash noted.   Psychiatric: He has a normal mood and affect. His behavior is normal.   Nursing note and vitals reviewed.        Current Outpatient Medications:   •  FLUoxetine (PROzac) 20 MG capsule, Take 1 capsule by mouth Daily., Disp: 90 capsule, Rfl: 1  •  rosuvastatin (CRESTOR) 20 MG tablet, Take 1 tablet by mouth Daily., Disp: 90 tablet, Rfl: 1  •  traZODone (DESYREL) 50 MG tablet, Take 1 tablet by mouth Every Night., Disp: 90 tablet, Rfl: 1     Diagnoses and all orders for this visit:    Dyslipidemia  -     rosuvastatin " (CRESTOR) 20 MG tablet; Take 1 tablet by mouth Daily.    Insomnia, unspecified type  -     traZODone (DESYREL) 50 MG tablet; Take 1 tablet by mouth Every Night.    Dysthymia  -     FLUoxetine (PROzac) 20 MG capsule; Take 1 capsule by mouth Daily.       Refill on medications today. No acute changes to conditions. Will need updated labs w/in 6 mo to check chol.      EMR Dragon/Transcription disclaimer:    Much of this encounter note is an electronic transcription/translation of spoken language to printed text.  The electronic translation of spoken language may permit erroneous, or at times, nonsensical words or phrases to be inadvertently transcribed.  Although I have reviewed the note for such errors some may still exist.

## 2019-04-01 ENCOUNTER — OFFICE VISIT (OUTPATIENT)
Dept: SPORTS MEDICINE | Facility: CLINIC | Age: 49
End: 2019-04-01

## 2019-04-01 VITALS
HEART RATE: 59 BPM | OXYGEN SATURATION: 99 % | WEIGHT: 186 LBS | DIASTOLIC BLOOD PRESSURE: 84 MMHG | SYSTOLIC BLOOD PRESSURE: 124 MMHG | HEIGHT: 69 IN | TEMPERATURE: 98 F | BODY MASS INDEX: 27.55 KG/M2

## 2019-04-01 DIAGNOSIS — J01.00 ACUTE NON-RECURRENT MAXILLARY SINUSITIS: Primary | ICD-10-CM

## 2019-04-01 PROCEDURE — 99213 OFFICE O/P EST LOW 20 MIN: CPT | Performed by: FAMILY MEDICINE

## 2019-04-01 RX ORDER — DEXTROMETHORPHAN HYDROBROMIDE AND PROMETHAZINE HYDROCHLORIDE 15; 6.25 MG/5ML; MG/5ML
5 SYRUP ORAL 4 TIMES DAILY PRN
Qty: 180 ML | Refills: 0 | Status: SHIPPED | OUTPATIENT
Start: 2019-04-01 | End: 2019-08-15

## 2019-04-01 RX ORDER — AMOXICILLIN AND CLAVULANATE POTASSIUM 875; 125 MG/1; MG/1
1 TABLET, FILM COATED ORAL 2 TIMES DAILY
Qty: 20 TABLET | Refills: 0 | Status: SHIPPED | OUTPATIENT
Start: 2019-04-01 | End: 2019-04-11

## 2019-04-01 RX ORDER — BENZONATATE 100 MG/1
100 CAPSULE ORAL 3 TIMES DAILY PRN
Qty: 30 CAPSULE | Refills: 0 | Status: SHIPPED | OUTPATIENT
Start: 2019-04-01 | End: 2019-08-15

## 2019-04-01 NOTE — PROGRESS NOTES
"Pedro is a 48 y.o. year old male    Chief Complaint   Patient presents with   • Generalized Body Aches   • Cough     sore throat    • Earache     bilateral        History of Present Illness  History of same, last treated by me in December with antibiotic, Augmentin.  His symptoms recurred approximately 1 week ago and have been progressively worsening.  Has had sinus pressure, ear fullness, postnasal drip with minimally productive cough.  No fever.  Also associates body aches.    I have reviewed the patient's medical, family, and social history in detail and updated the computerized patient record.    Review of Systems  Constitutional: Per HPI.  HEENT: Per HPI  CV: no palpitations  Resp: Per HPI  Musculoskeletal: Negative for myalgias or arthralgias.  Skin: Negative for rash.   Neurological: Negative for weakness and numbness.   Psychiatric/Behavioral: Negative for sleep disturbance.   All other systems reviewed and are negative.    /84   Pulse 59   Temp 98 °F (36.7 °C)   Ht 175.3 cm (69\")   Wt 84.4 kg (186 lb)   SpO2 99%   BMI 27.47 kg/m²      Physical Exam    Vital signs reviewed.   General: No acute distress.  Eyes: conjunctiva clear; pupils equally round and reactive  ENT: external ears and nose atraumatic; oropharynx clear  CV: RRR; no peripheral edema, 2+ distal pulses  Resp: CTA b/l; normal respiratory effort, no use of accessory muscles  Skin: no rashes or wounds; normal turgor  Psych: mood and affect appropriate; recent and remote memory intact  Neuro: sensation to light touch intact    Diagnoses and all orders for this visit:    Acute non-recurrent maxillary sinusitis  -     amoxicillin-clavulanate (AUGMENTIN) 875-125 MG per tablet; Take 1 tablet by mouth 2 (Two) Times a Day for 10 days.  -     promethazine-dextromethorphan (PROMETHAZINE-DM) 6.25-15 MG/5ML syrup; Take 5 mL by mouth 4 (Four) Times a Day As Needed for Cough.  -     benzonatate (TESSALON PERLES) 100 MG capsule; Take 1 capsule by " mouth 3 (Three) Times a Day As Needed for Cough.          EMR Dragon/Transcription disclaimer:    Much of this encounter note is an electronic transcription/translation of spoken language to printed text.  The electronic translation of spoken language may permit erroneous, or at times, nonsensical words or phrases to be inadvertently transcribed.  Although I have reviewed the note for such errors some may still exist.

## 2019-08-07 DIAGNOSIS — Z00.00 ANNUAL PHYSICAL EXAM: Primary | ICD-10-CM

## 2019-08-07 DIAGNOSIS — E78.5 DYSLIPIDEMIA: ICD-10-CM

## 2019-08-09 DIAGNOSIS — G47.00 INSOMNIA, UNSPECIFIED TYPE: ICD-10-CM

## 2019-08-09 LAB
ALBUMIN SERPL-MCNC: 4.6 G/DL (ref 3.5–5.2)
ALBUMIN/GLOB SERPL: 1.8 G/DL
ALP SERPL-CCNC: 48 U/L (ref 39–117)
ALT SERPL-CCNC: 36 U/L (ref 1–41)
APPEARANCE UR: CLEAR
AST SERPL-CCNC: 30 U/L (ref 1–40)
BACTERIA #/AREA URNS HPF: NORMAL /HPF
BASOPHILS # BLD AUTO: 0.02 10*3/MM3 (ref 0–0.2)
BASOPHILS NFR BLD AUTO: 0.3 % (ref 0–1.5)
BILIRUB SERPL-MCNC: 0.4 MG/DL (ref 0.2–1.2)
BILIRUB UR QL STRIP: NEGATIVE
BUN SERPL-MCNC: 16 MG/DL (ref 6–20)
BUN/CREAT SERPL: 17.6 (ref 7–25)
CALCIUM SERPL-MCNC: 9.5 MG/DL (ref 8.6–10.5)
CHLORIDE SERPL-SCNC: 101 MMOL/L (ref 98–107)
CHOLEST SERPL-MCNC: 218 MG/DL (ref 0–200)
CHOLEST/HDLC SERPL: 4.54 {RATIO}
CO2 SERPL-SCNC: 27.3 MMOL/L (ref 22–29)
COLOR UR: YELLOW
CREAT SERPL-MCNC: 0.91 MG/DL (ref 0.76–1.27)
EOSINOPHIL # BLD AUTO: 0.09 10*3/MM3 (ref 0–0.4)
EOSINOPHIL NFR BLD AUTO: 1.4 % (ref 0.3–6.2)
EPI CELLS #/AREA URNS HPF: NORMAL /HPF
ERYTHROCYTE [DISTWIDTH] IN BLOOD BY AUTOMATED COUNT: 12.5 % (ref 12.3–15.4)
GLOBULIN SER CALC-MCNC: 2.5 GM/DL
GLUCOSE SERPL-MCNC: 117 MG/DL (ref 65–99)
GLUCOSE UR QL: NEGATIVE
HCT VFR BLD AUTO: 47.7 % (ref 37.5–51)
HDLC SERPL-MCNC: 48 MG/DL (ref 40–60)
HGB BLD-MCNC: 15.2 G/DL (ref 13–17.7)
HGB UR QL STRIP: NEGATIVE
IMM GRANULOCYTES # BLD AUTO: 0.02 10*3/MM3 (ref 0–0.05)
IMM GRANULOCYTES NFR BLD AUTO: 0.3 % (ref 0–0.5)
KETONES UR QL STRIP: NEGATIVE
LDLC SERPL CALC-MCNC: 132 MG/DL (ref 0–100)
LEUKOCYTE ESTERASE UR QL STRIP: NEGATIVE
LYMPHOCYTES # BLD AUTO: 2.23 10*3/MM3 (ref 0.7–3.1)
LYMPHOCYTES NFR BLD AUTO: 35.4 % (ref 19.6–45.3)
MCH RBC QN AUTO: 30.2 PG (ref 26.6–33)
MCHC RBC AUTO-ENTMCNC: 31.9 G/DL (ref 31.5–35.7)
MCV RBC AUTO: 94.8 FL (ref 79–97)
MICRO URNS: NORMAL
MICRO URNS: NORMAL
MONOCYTES # BLD AUTO: 0.49 10*3/MM3 (ref 0.1–0.9)
MONOCYTES NFR BLD AUTO: 7.8 % (ref 5–12)
MUCOUS THREADS URNS QL MICRO: PRESENT /HPF
NEUTROPHILS # BLD AUTO: 3.45 10*3/MM3 (ref 1.7–7)
NEUTROPHILS NFR BLD AUTO: 54.8 % (ref 42.7–76)
NITRITE UR QL STRIP: NEGATIVE
NRBC BLD AUTO-RTO: 0 /100 WBC (ref 0–0.2)
PH UR STRIP: 6 [PH] (ref 5–7.5)
PLATELET # BLD AUTO: 231 10*3/MM3 (ref 140–450)
POTASSIUM SERPL-SCNC: 4.6 MMOL/L (ref 3.5–5.2)
PROT SERPL-MCNC: 7.1 G/DL (ref 6–8.5)
PROT UR QL STRIP: NEGATIVE
RBC # BLD AUTO: 5.03 10*6/MM3 (ref 4.14–5.8)
RBC #/AREA URNS HPF: NORMAL /HPF
SODIUM SERPL-SCNC: 140 MMOL/L (ref 136–145)
SP GR UR: 1.03 (ref 1–1.03)
TRIGL SERPL-MCNC: 192 MG/DL (ref 0–150)
URINALYSIS REFLEX: NORMAL
UROBILINOGEN UR STRIP-MCNC: 0.2 MG/DL (ref 0.2–1)
VLDLC SERPL CALC-MCNC: 38.4 MG/DL
WBC # BLD AUTO: 6.3 10*3/MM3 (ref 3.4–10.8)
WBC #/AREA URNS HPF: NORMAL /HPF

## 2019-08-09 RX ORDER — TRAZODONE HYDROCHLORIDE 50 MG/1
50 TABLET ORAL NIGHTLY
Qty: 90 TABLET | Refills: 0 | Status: SHIPPED | OUTPATIENT
Start: 2019-08-09 | End: 2019-11-06 | Stop reason: SDUPTHER

## 2019-08-15 ENCOUNTER — OFFICE VISIT (OUTPATIENT)
Dept: SPORTS MEDICINE | Facility: CLINIC | Age: 49
End: 2019-08-15

## 2019-08-15 VITALS
HEART RATE: 68 BPM | SYSTOLIC BLOOD PRESSURE: 122 MMHG | DIASTOLIC BLOOD PRESSURE: 78 MMHG | HEIGHT: 69 IN | OXYGEN SATURATION: 98 % | WEIGHT: 185 LBS | BODY MASS INDEX: 27.4 KG/M2

## 2019-08-15 DIAGNOSIS — R73.01 ABNORMAL FASTING GLUCOSE: ICD-10-CM

## 2019-08-15 DIAGNOSIS — Z00.00 ANNUAL PHYSICAL EXAM: Primary | ICD-10-CM

## 2019-08-15 DIAGNOSIS — E78.2 MIXED HYPERLIPIDEMIA: ICD-10-CM

## 2019-08-15 PROCEDURE — 99396 PREV VISIT EST AGE 40-64: CPT | Performed by: FAMILY MEDICINE

## 2019-08-15 NOTE — PROGRESS NOTES
"Pedro Katz is here today for an annual physical exam.     Eating a healthy diet. Exercising routinely.     HLD: taking Crestor. Admits he needs to make better dietary choices.    I have reviewed the patient's medical, family, and social history in detail and updated the computerized patient record.    Health Maintenance   Topic Date Due   • ANNUAL PHYSICAL  12/02/2018   • INFLUENZA VACCINE  08/01/2019   • LIPID PANEL  08/08/2020   • TDAP/TD VACCINES (2 - Td) 12/01/2027       PHQ-2 Depression Screening  Little interest or pleasure in doing things? 0   Feeling down, depressed, or hopeless? 0   PHQ-2 Total Score 0       Review of Systems  Constitutional: Negative for chills, fatigue and fever.   HENT: Negative for postnasal drip and sore throat.    Eyes: Negative for pain.   Respiratory: Negative for cough, chest tightness and wheezing.    Cardiovascular: Negative for chest pain.   Gastrointestinal: Negative.    Musculoskeletal: Negative for myalgias.   Skin: Negative for rash.   Neurological: Negative for numbness and headaches.   Psychiatric/Behavioral: Negative.    All other systems reviewed and are negative.    /78   Pulse 68   Ht 175.3 cm (69\")   Wt 83.9 kg (185 lb)   SpO2 98%   BMI 27.32 kg/m²      Physical Exam    Vital signs reviewed.  General appearance: No acute distress  Eyes: conjunctiva clear without erythema; pupils equally round and reactive  ENT: external ears and nose normal; hearing normal, oropharynx clear  Neck: supple; no thyromegaly  CV: normal rate and rhythm; no peripheral edema  Respiratory: normal respiratory effort; lungs clear to auscultation bilaterally  MSK: normal gait and station; no focal joint deformity or swelling  Skin: no rash or wounds; normal turgor  Neuro: cranial nerves 2-12 grossly intact; normal sensation to light touch  Psych: mood and affect normal; recent and remote memory intact    Orders Only on 08/07/2019   Component Date Value Ref Range Status   • WBC " 08/08/2019 6.30  3.40 - 10.80 10*3/mm3 Final   • RBC 08/08/2019 5.03  4.14 - 5.80 10*6/mm3 Final   • Hemoglobin 08/08/2019 15.2  13.0 - 17.7 g/dL Final   • Hematocrit 08/08/2019 47.7  37.5 - 51.0 % Final   • MCV 08/08/2019 94.8  79.0 - 97.0 fL Final   • MCH 08/08/2019 30.2  26.6 - 33.0 pg Final   • MCHC 08/08/2019 31.9  31.5 - 35.7 g/dL Final   • RDW 08/08/2019 12.5  12.3 - 15.4 % Final   • Platelets 08/08/2019 231  140 - 450 10*3/mm3 Final   • Neutrophil Rel % 08/08/2019 54.8  42.7 - 76.0 % Final   • Lymphocyte Rel % 08/08/2019 35.4  19.6 - 45.3 % Final   • Monocyte Rel % 08/08/2019 7.8  5.0 - 12.0 % Final   • Eosinophil Rel % 08/08/2019 1.4  0.3 - 6.2 % Final   • Basophil Rel % 08/08/2019 0.3  0.0 - 1.5 % Final   • Neutrophils Absolute 08/08/2019 3.45  1.70 - 7.00 10*3/mm3 Final   • Lymphocytes Absolute 08/08/2019 2.23  0.70 - 3.10 10*3/mm3 Final   • Monocytes Absolute 08/08/2019 0.49  0.10 - 0.90 10*3/mm3 Final   • Eosinophils Absolute 08/08/2019 0.09  0.00 - 0.40 10*3/mm3 Final   • Basophils Absolute 08/08/2019 0.02  0.00 - 0.20 10*3/mm3 Final   • Immature Granulocyte Rel % 08/08/2019 0.3  0.0 - 0.5 % Final   • Immature Grans Absolute 08/08/2019 0.02  0.00 - 0.05 10*3/mm3 Final   • nRBC 08/08/2019 0.0  0.0 - 0.2 /100 WBC Final   • Glucose 08/08/2019 117* 65 - 99 mg/dL Final   • BUN 08/08/2019 16  6 - 20 mg/dL Final   • Creatinine 08/08/2019 0.91  0.76 - 1.27 mg/dL Final   • eGFR Non  Am 08/08/2019 90  >60 mL/min/1.73 Final   • eGFR African Am 08/08/2019 109  >60 mL/min/1.73 Final   • BUN/Creatinine Ratio 08/08/2019 17.6  7.0 - 25.0 Final   • Sodium 08/08/2019 140  136 - 145 mmol/L Final   • Potassium 08/08/2019 4.6  3.5 - 5.2 mmol/L Final   • Chloride 08/08/2019 101  98 - 107 mmol/L Final   • Total CO2 08/08/2019 27.3  22.0 - 29.0 mmol/L Final   • Calcium 08/08/2019 9.5  8.6 - 10.5 mg/dL Final   • Total Protein 08/08/2019 7.1  6.0 - 8.5 g/dL Final   • Albumin 08/08/2019 4.60  3.50 - 5.20 g/dL Final   •  Globulin 08/08/2019 2.5  gm/dL Final   • A/G Ratio 08/08/2019 1.8  g/dL Final   • Total Bilirubin 08/08/2019 0.4  0.2 - 1.2 mg/dL Final   • Alkaline Phosphatase 08/08/2019 48  39 - 117 U/L Final   • AST (SGOT) 08/08/2019 30  1 - 40 U/L Final   • ALT (SGPT) 08/08/2019 36  1 - 41 U/L Final   • Total Cholesterol 08/08/2019 218* 0 - 200 mg/dL Final   • Triglycerides 08/08/2019 192* 0 - 150 mg/dL Final   • HDL Cholesterol 08/08/2019 48  40 - 60 mg/dL Final   • VLDL Cholesterol 08/08/2019 38.4  mg/dL Final   • LDL Cholesterol  08/08/2019 132* 0 - 100 mg/dL Final   • Chol/HDL Ratio 08/08/2019 4.54   Final   • Specific Gravity, UA 08/08/2019 1.028  1.005 - 1.030 Final   • pH, UA 08/08/2019 6.0  5.0 - 7.5 Final   • Color, UA 08/08/2019 Yellow  Yellow Final   • Appearance, UA 08/08/2019 Clear  Clear Final   • Leukocytes, UA 08/08/2019 Negative  Negative Final   • Protein 08/08/2019 Negative  Negative/Trace Final   • Glucose, UA 08/08/2019 Negative  Negative Final   • Ketones 08/08/2019 Negative  Negative Final   • Blood, UA 08/08/2019 Negative  Negative Final   • Bilirubin, UA 08/08/2019 Negative  Negative Final   • Urobilinogen, UA 08/08/2019 0.2  0.2 - 1.0 mg/dL Final   • Nitrite, UA 08/08/2019 Negative  Negative Final   • Microscopic Examination 08/08/2019 Comment   Final    Microscopic follows if indicated.   • Microscopic Examination 08/08/2019 See below:   Final    Microscopic was indicated and was performed.   • Urinalysis Reflex 08/08/2019 Comment   Final    This specimen will not reflex to a Urine Culture.   • WBC, UA 08/08/2019 0-5  0 - 5 /hpf Final   • RBC, UA 08/08/2019 0-2  0 - 2 /hpf Final   • Epithelial Cells (non renal) 08/08/2019 None seen  0 - 10 /hpf Final   • Mucus, UA 08/08/2019 Present  Not Estab. /hpf Final   • Bacteria, UA 08/08/2019 None seen  None seen/Few /hpf Final        Pedro was seen today for annual exam.    Diagnoses and all orders for this visit:    Annual physical exam    Mixed  hyperlipidemia    Abnormal fasting glucose        Encourage healthy diet and exercise.  Encourage patient to stay up to date on screening examinations as indicated based on age and risk factors.    EMR Dragon/Transcription disclaimer:    Much of this encounter note is an electronic transcription/translation of spoken language to printed text.  The electronic translation of spoken language may permit erroneous, or at times, nonsensical words or phrases to be inadvertently transcribed.  Although I have reviewed the note for such errors some may still exist.

## 2019-09-25 ENCOUNTER — OFFICE VISIT (OUTPATIENT)
Dept: SPORTS MEDICINE | Facility: CLINIC | Age: 49
End: 2019-09-25

## 2019-09-25 VITALS
BODY MASS INDEX: 24.07 KG/M2 | HEART RATE: 115 BPM | WEIGHT: 162.5 LBS | OXYGEN SATURATION: 98 % | SYSTOLIC BLOOD PRESSURE: 140 MMHG | DIASTOLIC BLOOD PRESSURE: 82 MMHG | HEIGHT: 69 IN

## 2019-09-25 DIAGNOSIS — J01.00 ACUTE NON-RECURRENT MAXILLARY SINUSITIS: Primary | ICD-10-CM

## 2019-09-25 PROCEDURE — 99213 OFFICE O/P EST LOW 20 MIN: CPT | Performed by: FAMILY MEDICINE

## 2019-09-25 RX ORDER — DOXYCYCLINE 100 MG/1
100 CAPSULE ORAL EVERY 12 HOURS SCHEDULED
Qty: 20 CAPSULE | Refills: 0 | Status: SHIPPED | OUTPATIENT
Start: 2019-09-25 | End: 2019-10-05

## 2019-09-25 NOTE — PROGRESS NOTES
"Chief Complaint   Patient presents with   • Earache     both ears have been hurting a week    • Nasal Congestion     x 1 week    • URI     x1week          History of Present Illness  Recurrence of sinus infection.  Has been several months since last treated.  Symptom onset last week and has been persistent.  Complains of ear pressure, sinus pressure, rhinorrhea and some postnasal drip.  Cough is minimal.  No fever.    I have reviewed the patient's medical, family, and social history in detail and updated the computerized patient record.    Review of Systems  Constitutional: Per HPI.  HEENT: Per HPI  CV: no palpitations  Resp: Per HPI  Musculoskeletal: Negative for myalgias or arthralgias.  Skin: Negative for rash.   Neurological: Negative for weakness and numbness.   Psychiatric/Behavioral: Negative for sleep disturbance.   All other systems reviewed and are negative.    /82 (BP Location: Left arm, Patient Position: Sitting, Cuff Size: Adult)   Pulse 115   Ht 175.3 cm (69.02\")   Wt 73.7 kg (162 lb 8 oz)   SpO2 98%   BMI 23.99 kg/m²       Physical Exam    Vital signs reviewed.   General: No acute distress.  Eyes: conjunctiva clear; pupils equally round and reactive  ENT: external ears and nose atraumatic; oropharynx clear  CV: RRR; no peripheral edema, 2+ distal pulses  Resp: CTA b/l; normal respiratory effort, no use of accessory muscles  Skin: no rashes or wounds; normal turgor  Psych: mood and affect appropriate; recent and remote memory intact  Neuro: sensation to light touch intact    Pedro was seen today for earache, nasal congestion and uri.    Diagnoses and all orders for this visit:    Acute non-recurrent maxillary sinusitis  -     doxycycline (MONODOX) 100 MG capsule; Take 1 capsule by mouth Every 12 (Twelve) Hours for 10 days.            EMR Dragon/Transcription disclaimer:    Much of this encounter note is an electronic transcription/translation of spoken language to printed text.  The " electronic translation of spoken language may permit erroneous, or at times, nonsensical words or phrases to be inadvertently transcribed.  Although I have reviewed the note for such errors some may still exist.

## 2019-11-06 DIAGNOSIS — G47.00 INSOMNIA, UNSPECIFIED TYPE: ICD-10-CM

## 2019-11-06 RX ORDER — TRAZODONE HYDROCHLORIDE 50 MG/1
50 TABLET ORAL NIGHTLY
Qty: 90 TABLET | Refills: 0 | Status: SHIPPED | OUTPATIENT
Start: 2019-11-06 | End: 2020-02-03

## 2020-01-21 DIAGNOSIS — E78.5 DYSLIPIDEMIA: ICD-10-CM

## 2020-01-21 RX ORDER — ROSUVASTATIN CALCIUM 20 MG/1
20 TABLET, COATED ORAL DAILY
Qty: 90 TABLET | Refills: 1 | Status: SHIPPED | OUTPATIENT
Start: 2020-01-21 | End: 2020-07-10

## 2020-02-02 DIAGNOSIS — G47.00 INSOMNIA, UNSPECIFIED TYPE: ICD-10-CM

## 2020-02-03 RX ORDER — TRAZODONE HYDROCHLORIDE 50 MG/1
50 TABLET ORAL NIGHTLY
Qty: 90 TABLET | Refills: 0 | Status: SHIPPED | OUTPATIENT
Start: 2020-02-03 | End: 2020-04-29

## 2020-03-31 DIAGNOSIS — F34.1 DYSTHYMIA: ICD-10-CM

## 2020-03-31 RX ORDER — FLUOXETINE HYDROCHLORIDE 20 MG/1
20 CAPSULE ORAL DAILY
Qty: 90 CAPSULE | Refills: 1 | Status: SHIPPED | OUTPATIENT
Start: 2020-03-31 | End: 2020-10-28 | Stop reason: SDUPTHER

## 2020-04-29 DIAGNOSIS — G47.00 INSOMNIA, UNSPECIFIED TYPE: ICD-10-CM

## 2020-04-29 RX ORDER — TRAZODONE HYDROCHLORIDE 50 MG/1
50 TABLET ORAL NIGHTLY
Qty: 90 TABLET | Refills: 0 | Status: SHIPPED | OUTPATIENT
Start: 2020-04-29 | End: 2020-07-24

## 2020-07-10 DIAGNOSIS — E78.5 DYSLIPIDEMIA: ICD-10-CM

## 2020-07-10 RX ORDER — ROSUVASTATIN CALCIUM 20 MG/1
20 TABLET, COATED ORAL DAILY
Qty: 90 TABLET | Refills: 1 | Status: SHIPPED | OUTPATIENT
Start: 2020-07-10 | End: 2021-01-06 | Stop reason: SDUPTHER

## 2020-07-24 ENCOUNTER — OFFICE VISIT (OUTPATIENT)
Dept: SPORTS MEDICINE | Facility: CLINIC | Age: 50
End: 2020-07-24

## 2020-07-24 VITALS
OXYGEN SATURATION: 99 % | HEIGHT: 69 IN | SYSTOLIC BLOOD PRESSURE: 138 MMHG | WEIGHT: 176 LBS | DIASTOLIC BLOOD PRESSURE: 90 MMHG | RESPIRATION RATE: 16 BRPM | TEMPERATURE: 98.4 F | BODY MASS INDEX: 26.07 KG/M2 | HEART RATE: 56 BPM

## 2020-07-24 DIAGNOSIS — S91.311A LACERATION OF HEEL, RIGHT, INITIAL ENCOUNTER: Primary | ICD-10-CM

## 2020-07-24 DIAGNOSIS — G47.00 INSOMNIA, UNSPECIFIED TYPE: ICD-10-CM

## 2020-07-24 PROCEDURE — 99212 OFFICE O/P EST SF 10 MIN: CPT | Performed by: FAMILY MEDICINE

## 2020-07-24 RX ORDER — TRAZODONE HYDROCHLORIDE 50 MG/1
50 TABLET ORAL NIGHTLY
Qty: 90 TABLET | Refills: 0 | Status: SHIPPED | OUTPATIENT
Start: 2020-07-24 | End: 2020-10-28 | Stop reason: SDUPTHER

## 2020-07-24 NOTE — PROGRESS NOTES
"Pedro is a 49 y.o. year old male evaluation of a problem that is new to this examiner.    Chief Complaint   Patient presents with   • Foot Pain     Laceration on heal, concerned about infection        History of Present Illness   HPI     6 d ago, storm door caught back of heel. Metal. Improving. No fever or chills.  Does have a small laceration on the back of the heel.  Able to bear weight.  He did have some swelling surrounding it but this has been improving gradually.  No drainage from the wound.    I have reviewed the patient's medical, family, and social history in detail and updated the computerized patient record.    Review of Systems   Constitutional: Negative for chills, fatigue and fever.   HENT: Negative for congestion, rhinorrhea and sinus pressure.    Respiratory: Negative for chest tightness and shortness of breath.    Cardiovascular: Negative for chest pain.   Gastrointestinal: Negative for abdominal pain.   Musculoskeletal: Negative for arthralgias.   Skin: Negative for rash.        HPI   Neurological: Negative for numbness and headaches.   All other systems reviewed and are negative.      /90 (BP Location: Right arm, Patient Position: Sitting, Cuff Size: Adult)   Pulse 56   Temp 98.4 °F (36.9 °C)   Resp 16   Ht 175.3 cm (69\")   Wt 79.8 kg (176 lb)   SpO2 99%   BMI 25.99 kg/m²      Physical Exam   Constitutional: He is oriented to person, place, and time. Vital signs are normal. He appears well-developed and well-nourished.   HENT:   Head: Normocephalic and atraumatic.   Eyes: Conjunctivae and EOM are normal.   Pulmonary/Chest: No accessory muscle usage. No respiratory distress.   Musculoskeletal: He exhibits no deformity.   Neurological: He is alert and oriented to person, place, and time.   Skin: Skin is warm and dry. No rash noted.        Psychiatric: He has a normal mood and affect. His behavior is normal.   Nursing note and vitals reviewed.        Current Outpatient Medications:   • "  FLUoxetine (PROzac) 20 MG capsule, TAKE 1 CAPSULE BY MOUTH DAILY, Disp: 90 capsule, Rfl: 1  •  rosuvastatin (CRESTOR) 20 MG tablet, TAKE 1 TABLET BY MOUTH DAILY, Disp: 90 tablet, Rfl: 1  •  traZODone (DESYREL) 50 MG tablet, TAKE 1 TABLET BY MOUTH EVERY NIGHT, Disp: 90 tablet, Rfl: 0     Diagnoses and all orders for this visit:    Laceration of heel, right, initial encounter       Up-to-date on Tdap.  Wound is healing in expected fashion.  Continue conservative measures.      EMR Dragon/Transcription disclaimer:    Much of this encounter note is an electronic transcription/translation of spoken language to printed text.  The electronic translation of spoken language may permit erroneous, or at times, nonsensical words or phrases to be inadvertently transcribed.  Although I have reviewed the note for such errors some may still exist.

## 2020-10-17 DIAGNOSIS — G47.00 INSOMNIA, UNSPECIFIED TYPE: ICD-10-CM

## 2020-10-19 RX ORDER — TRAZODONE HYDROCHLORIDE 50 MG/1
50 TABLET ORAL NIGHTLY
Qty: 90 TABLET | Refills: 0 | OUTPATIENT
Start: 2020-10-19

## 2020-10-28 ENCOUNTER — OFFICE VISIT (OUTPATIENT)
Dept: SPORTS MEDICINE | Facility: CLINIC | Age: 50
End: 2020-10-28

## 2020-10-28 VITALS
HEIGHT: 69 IN | DIASTOLIC BLOOD PRESSURE: 82 MMHG | SYSTOLIC BLOOD PRESSURE: 111 MMHG | OXYGEN SATURATION: 98 % | WEIGHT: 183.4 LBS | RESPIRATION RATE: 15 BRPM | HEART RATE: 65 BPM | BODY MASS INDEX: 27.16 KG/M2 | TEMPERATURE: 98.2 F

## 2020-10-28 DIAGNOSIS — F34.1 DYSTHYMIA: ICD-10-CM

## 2020-10-28 DIAGNOSIS — Z00.00 ANNUAL PHYSICAL EXAM: Primary | ICD-10-CM

## 2020-10-28 DIAGNOSIS — G47.00 INSOMNIA, UNSPECIFIED TYPE: ICD-10-CM

## 2020-10-28 DIAGNOSIS — Z23 IMMUNIZATION DUE: ICD-10-CM

## 2020-10-28 DIAGNOSIS — E78.5 DYSLIPIDEMIA: ICD-10-CM

## 2020-10-28 PROCEDURE — 99396 PREV VISIT EST AGE 40-64: CPT | Performed by: FAMILY MEDICINE

## 2020-10-28 PROCEDURE — 90686 IIV4 VACC NO PRSV 0.5 ML IM: CPT | Performed by: FAMILY MEDICINE

## 2020-10-28 PROCEDURE — 90471 IMMUNIZATION ADMIN: CPT | Performed by: FAMILY MEDICINE

## 2020-10-28 RX ORDER — TRAZODONE HYDROCHLORIDE 50 MG/1
50 TABLET ORAL NIGHTLY
Qty: 90 TABLET | Refills: 3 | Status: SHIPPED | OUTPATIENT
Start: 2020-10-28 | End: 2021-09-24 | Stop reason: SDUPTHER

## 2020-10-28 RX ORDER — FLUOXETINE HYDROCHLORIDE 20 MG/1
20 CAPSULE ORAL DAILY
Qty: 90 CAPSULE | Refills: 3 | Status: SHIPPED | OUTPATIENT
Start: 2020-10-28 | End: 2021-09-24 | Stop reason: SDUPTHER

## 2020-10-28 NOTE — PROGRESS NOTES
"Pedro Katz is here today for an annual physical exam.     Eating a healthy diet. Exercising routinely at home. Purchased cardio equipment. Stopped going to [facility] due to pandemic.    HLD: Crestor as written.  Dysthymia: Prozac as written. Needs refill.  Insomnia: trazodone as written. Needs refill.    I have reviewed the patient's medical, family, and social history in detail and updated the computerized patient record.    Health Maintenance   Topic Date Due   • COLONOSCOPY  1970   • HEPATITIS C SCREENING  04/26/2017   • INFLUENZA VACCINE  08/01/2020   • LIPID PANEL  08/08/2020   • ANNUAL PHYSICAL  10/29/2021   • TDAP/TD VACCINES (2 - Td) 12/01/2027   • Pneumococcal Vaccine 0-64  Aged Out       PHQ-2 Depression Screening  Little interest or pleasure in doing things? 0   Feeling down, depressed, or hopeless? 0   PHQ-2 Total Score 0       Review of Systems  Constitutional: Negative for chills, fatigue and fever.   HENT: Negative for postnasal drip and sore throat.    Eyes: Negative for pain.   Respiratory: Negative for cough, chest tightness and wheezing.    Cardiovascular: Negative for chest pain.   Gastrointestinal: Negative.    Musculoskeletal: Negative for myalgias.   Skin: Negative for rash.   Neurological: Negative for numbness and headaches.   Psychiatric/Behavioral: Negative.    All other systems reviewed and are negative.    /82 (BP Location: Left arm, Patient Position: Sitting, Cuff Size: Adult)   Pulse 65   Temp 98.2 °F (36.8 °C) (Oral)   Resp 15   Ht 175.3 cm (69\")   Wt 83.2 kg (183 lb 6.4 oz)   SpO2 98%   BMI 27.08 kg/m²      Physical Exam    Vital signs reviewed.  General appearance: No acute distress  Eyes: conjunctiva clear without erythema; pupils equally round and reactive  ENT: external ears and nose normal; hearing normal, oropharynx clear  Neck: supple; no thyromegaly  CV: normal rate and rhythm; no peripheral edema  Respiratory: normal respiratory effort; lungs clear to " auscultation bilaterally  MSK: normal gait and station; no focal joint deformity or swelling  Skin: no rash or wounds; normal turgor  Neuro: cranial nerves 2-12 grossly intact; normal sensation to light touch  Psych: mood and affect normal; recent and remote memory intact    No visits with results within 2 Week(s) from this visit.   Latest known visit with results is:   Admission on 04/27/2020, Discharged on 04/27/2020   Component Date Value Ref Range Status   • SARS-CoV-2,  04/27/2020 Not Detected  Not Detected Final    Testing was performed using the sterling(R) SARS-CoV-2 test.  This test was developed and its performance characteristics determined  by [facility]. This test has not been FDA cleared or  approved. This test has been authorized by FDA under an Emergency Use  Authorization (EUA). This test is only authorized for the duration of  time the declaration that circumstances exist justifying the  authorization of the emergency use of in vitro diagnostic tests for  detection of SARS-CoV-2 virus and/or diagnosis of COVID-19 infection  under section 564(b)(1) of the Act, 21 U.S.C. 360bbb-3(b)(1), unless  the authorization is terminated or revoked sooner.        Diagnoses and all orders for this visit:    1. Annual physical exam (Primary)  -     CBC & Differential  -     Comprehensive Metabolic Panel  -     Hepatitis C Antibody  -     Lipid Panel    2. Immunization due  -     Fluarix/Fluzone/Afluria Quad>6 Months    3. Dyslipidemia  -     Comprehensive Metabolic Panel  -     Lipid Panel    4. Insomnia, unspecified type  -     traZODone (DESYREL) 50 MG tablet; Take 1 tablet by mouth Every Night.  Dispense: 90 tablet; Refill: 3    5. Dysthymia  -     FLUoxetine (PROzac) 20 MG capsule; Take 1 capsule by mouth Daily.  Dispense: 90 capsule; Refill: 3        Encourage healthy diet and exercise.  Encourage patient to stay up to date on screening examinations as indicated based on age and risk factors.    EMR  Dragon/Transcription disclaimer:    Much of this encounter note is an electronic transcription/translation of spoken language to printed text.  The electronic translation of spoken language may permit erroneous, or at times, nonsensical words or phrases to be inadvertently transcribed.  Although I have reviewed the note for such errors some may still exist.

## 2020-10-29 LAB
ALBUMIN SERPL-MCNC: 4.8 G/DL (ref 3.5–5.2)
ALBUMIN/GLOB SERPL: 2.4 G/DL
ALP SERPL-CCNC: 45 U/L (ref 39–117)
ALT SERPL-CCNC: 46 U/L (ref 1–41)
AST SERPL-CCNC: 43 U/L (ref 1–40)
BASOPHILS # BLD AUTO: 0.02 10*3/MM3 (ref 0–0.2)
BASOPHILS NFR BLD AUTO: 0.4 % (ref 0–1.5)
BILIRUB SERPL-MCNC: 0.3 MG/DL (ref 0–1.2)
BUN SERPL-MCNC: 13 MG/DL (ref 6–20)
BUN/CREAT SERPL: 12.7 (ref 7–25)
CALCIUM SERPL-MCNC: 9.3 MG/DL (ref 8.6–10.5)
CHLORIDE SERPL-SCNC: 101 MMOL/L (ref 98–107)
CHOLEST SERPL-MCNC: 187 MG/DL (ref 0–200)
CO2 SERPL-SCNC: 28.5 MMOL/L (ref 22–29)
CREAT SERPL-MCNC: 1.02 MG/DL (ref 0.76–1.27)
EOSINOPHIL # BLD AUTO: 0.08 10*3/MM3 (ref 0–0.4)
EOSINOPHIL NFR BLD AUTO: 1.7 % (ref 0.3–6.2)
ERYTHROCYTE [DISTWIDTH] IN BLOOD BY AUTOMATED COUNT: 12.3 % (ref 12.3–15.4)
GLOBULIN SER CALC-MCNC: 2 GM/DL
GLUCOSE SERPL-MCNC: 103 MG/DL (ref 65–99)
HCT VFR BLD AUTO: 41.2 % (ref 37.5–51)
HCV AB S/CO SERPL IA: <0.1 S/CO RATIO (ref 0–0.9)
HDLC SERPL-MCNC: 57 MG/DL (ref 40–60)
HGB BLD-MCNC: 14.3 G/DL (ref 13–17.7)
IMM GRANULOCYTES # BLD AUTO: 0.01 10*3/MM3 (ref 0–0.05)
IMM GRANULOCYTES NFR BLD AUTO: 0.2 % (ref 0–0.5)
LDLC SERPL CALC-MCNC: 114 MG/DL (ref 0–100)
LYMPHOCYTES # BLD AUTO: 1.78 10*3/MM3 (ref 0.7–3.1)
LYMPHOCYTES NFR BLD AUTO: 38.6 % (ref 19.6–45.3)
MCH RBC QN AUTO: 31 PG (ref 26.6–33)
MCHC RBC AUTO-ENTMCNC: 34.7 G/DL (ref 31.5–35.7)
MCV RBC AUTO: 89.2 FL (ref 79–97)
MONOCYTES # BLD AUTO: 0.31 10*3/MM3 (ref 0.1–0.9)
MONOCYTES NFR BLD AUTO: 6.7 % (ref 5–12)
NEUTROPHILS # BLD AUTO: 2.41 10*3/MM3 (ref 1.7–7)
NEUTROPHILS NFR BLD AUTO: 52.4 % (ref 42.7–76)
NRBC BLD AUTO-RTO: 0 /100 WBC (ref 0–0.2)
PLATELET # BLD AUTO: 213 10*3/MM3 (ref 140–450)
POTASSIUM SERPL-SCNC: 4.8 MMOL/L (ref 3.5–5.2)
PROT SERPL-MCNC: 6.8 G/DL (ref 6–8.5)
RBC # BLD AUTO: 4.62 10*6/MM3 (ref 4.14–5.8)
SODIUM SERPL-SCNC: 137 MMOL/L (ref 136–145)
TRIGL SERPL-MCNC: 87 MG/DL (ref 0–150)
VLDLC SERPL CALC-MCNC: 16 MG/DL (ref 5–40)
WBC # BLD AUTO: 4.61 10*3/MM3 (ref 3.4–10.8)

## 2020-11-25 ENCOUNTER — OFFICE VISIT (OUTPATIENT)
Dept: SPORTS MEDICINE | Facility: CLINIC | Age: 50
End: 2020-11-25

## 2020-11-25 VITALS
HEIGHT: 69 IN | HEART RATE: 61 BPM | BODY MASS INDEX: 27.11 KG/M2 | OXYGEN SATURATION: 99 % | SYSTOLIC BLOOD PRESSURE: 130 MMHG | TEMPERATURE: 97.5 F | WEIGHT: 183 LBS | DIASTOLIC BLOOD PRESSURE: 90 MMHG

## 2020-11-25 DIAGNOSIS — M23.92 INTERNAL DERANGEMENT OF LEFT KNEE: ICD-10-CM

## 2020-11-25 DIAGNOSIS — M25.562 ACUTE PAIN OF LEFT KNEE: Primary | ICD-10-CM

## 2020-11-25 PROCEDURE — 73562 X-RAY EXAM OF KNEE 3: CPT | Performed by: FAMILY MEDICINE

## 2020-11-25 PROCEDURE — 99214 OFFICE O/P EST MOD 30 MIN: CPT | Performed by: FAMILY MEDICINE

## 2020-11-25 NOTE — PROGRESS NOTES
"Pedro is a 49 y.o. year old male    Chief Complaint   Patient presents with   • Knee Pain     Evaluation for LT knee pain/injury - reports having a running injury, but states he had missed a couple steps a few months while carring some dry wall - pain radiates at times proximal and distal, no other sxs reported - here today with new x-rays for further evaluation and treatment        History of Present Illness  Symptom onset in June or July 2020.  He was carrying drywall and stepped awkwardly, twisting his foot.  Had immediate swelling in the knee.  Has tried ice, ibuprofen.  Swelling has resolved though he still associates pain, relative instability.  He has tried running but it is very painful.  He is able to tolerate 0 gravity treadmill at home.  No nighttime symptoms.    I have reviewed the patient's medical, family, and social history in detail and updated the computerized patient record.    Review of Systems  Constitutional: Negative for fever.   Musculoskeletal:        Per HPI   Skin: Negative for rash.   Neurological: Negative for weakness and numbness.   Psychiatric/Behavioral: Negative for sleep disturbance.   All other systems reviewed and are negative.    /90 (BP Location: Right arm, Patient Position: Sitting, Cuff Size: Adult)   Pulse 61   Temp 97.5 °F (36.4 °C)   Ht 175.3 cm (69.02\")   Wt 83 kg (183 lb)   SpO2 99%   BMI 27.01 kg/m²      Physical Exam    Mask worn thru encounter  Vital signs reviewed.   General: No acute distress.  Eyes: conjunctiva clear; pupils equally round and reactive  ENT: external ears atraumatic  CV: no peripheral edema  Resp: normal respiratory effort, no use of accessory muscles  Skin: no rashes or wounds; normal turgor  Psych: mood and affect appropriate; recent and remote memory intact  Neuro: sensation to light touch intact    MSK Exam:  L knee: No effusion.  Full range of motion.  Negative Lachman.  Negative lateral Paige, positive medial Paige.  " Tenderness along the medial joint line.  No varus valgus laxity.  Positive medial Thessaly.    Left Knee X-Ray  Indication: Pain    Views: AP, Lateral, and Panaca    Findings:  No fracture  No bony lesion  Normal soft tissues  Normal joint spaces    No prior studies were available for comparison.    Diagnoses and all orders for this visit:    Acute pain of left knee  -     XR Knee 3+ View With Sunrise Left  -     MRI Knee Left Without Contrast; Future    Internal derangement of left knee  -     MRI Knee Left Without Contrast; Future      I am concerned for acute medial meniscal tear.  I recommend MRI.  Telephone visit to discuss results.  Activity modifications discussed.    EMR Dragon/Transcription disclaimer:    Much of this encounter note is an electronic transcription/translation of spoken language to printed text.  The electronic translation of spoken language may permit erroneous, or at times, nonsensical words or phrases to be inadvertently transcribed.  Although I have reviewed the note for such errors some may still exist.

## 2020-12-16 ENCOUNTER — HOSPITAL ENCOUNTER (OUTPATIENT)
Dept: MRI IMAGING | Facility: HOSPITAL | Age: 50
Discharge: HOME | End: 2020-12-16
Admitting: FAMILY MEDICINE

## 2020-12-16 DIAGNOSIS — M23.92 INTERNAL DERANGEMENT OF LEFT KNEE: ICD-10-CM

## 2020-12-16 DIAGNOSIS — M25.562 ACUTE PAIN OF LEFT KNEE: ICD-10-CM

## 2020-12-16 PROCEDURE — 73721 MRI JNT OF LWR EXTRE W/O DYE: CPT

## 2020-12-21 ENCOUNTER — OFFICE VISIT (OUTPATIENT)
Dept: SPORTS MEDICINE | Facility: CLINIC | Age: 50
End: 2020-12-21

## 2020-12-21 ENCOUNTER — TELEPHONE (OUTPATIENT)
Dept: SPORTS MEDICINE | Facility: CLINIC | Age: 50
End: 2020-12-21

## 2020-12-21 DIAGNOSIS — M76.52 PATELLAR TENDINITIS, LEFT KNEE: ICD-10-CM

## 2020-12-21 DIAGNOSIS — M25.562 ACUTE PAIN OF LEFT KNEE: ICD-10-CM

## 2020-12-21 DIAGNOSIS — M25.562 ACUTE PAIN OF LEFT KNEE: Primary | ICD-10-CM

## 2020-12-21 PROCEDURE — 99441 PR PHYS/QHP TELEPHONE EVALUATION 5-10 MIN: CPT | Performed by: FAMILY MEDICINE

## 2020-12-21 RX ORDER — NITROGLYCERIN 0.1MG/HR
PATCH, TRANSDERMAL 24 HOURS TRANSDERMAL
Qty: 90 PATCH | OUTPATIENT
Start: 2020-12-21

## 2020-12-21 RX ORDER — NITROGLYCERIN 0.1MG/HR
PATCH, TRANSDERMAL 24 HOURS TRANSDERMAL
Qty: 30 PATCH | Refills: 0 | Status: SHIPPED | OUTPATIENT
Start: 2020-12-21 | End: 2021-08-24

## 2020-12-21 NOTE — TELEPHONE ENCOUNTER
Spoke with pharmacist regarding directions on nitroglycerin patch. Use 1/2 of patch directly onto left knee (per dr huerta). No further action needed.

## 2020-12-21 NOTE — PROGRESS NOTES
Telephone Visit Note    CC: MRI f/up    History of Present Illness  Still painful to run, go up/down stairs, more so when going down. Continues w/0 gravity treadmill.    MRI L knee wo contrast reviewed independently.  Partial interstitial tear of the patellar tendon with patellar tendinitis.  Incidental note of ganglion cyst along the posterior medial knee that does not about the medial meniscus.  There is no medial meniscus tear.    Diagnoses and all orders for this visit:    Acute pain of left knee  -     nitroglycerin (NITRODUR) 0.1 MG/HR patch; Use as directed to apply 1/2 tab to anterior left knee for 12 hours then remove. Continue for 30 days.  -     Ambulatory Referral to Physical Therapy    Patellar tendinitis, left knee  -     nitroglycerin (NITRODUR) 0.1 MG/HR patch; Use as directed to apply 1/2 tab to anterior left knee for 12 hours then remove. Continue for 30 days.  -     Ambulatory Referral to Physical Therapy      Can continue activity as tolerated.  Nitroglycerin patch as written.  Referral to physical therapy.  Consider PRP.    This patient was evaluated by telephone due to current precautions with COVID-19.  Consent to telephone visit for this problem was given by the patient. I spent a total of 7 minutes on the phone with the patient.

## 2020-12-29 ENCOUNTER — TELEPHONE (OUTPATIENT)
Dept: ORTHOPEDICS | Facility: OTHER | Age: 50
End: 2020-12-29

## 2020-12-29 NOTE — TELEPHONE ENCOUNTER
Order has been faxed as patient requested. Angelique will call him to schedule.    Tried calling patient but I am unable to leave VM.

## 2020-12-29 NOTE — TELEPHONE ENCOUNTER
Caller: PATIENT     Relationship: SELF    Best call back number: [phone]    What orders are you requesting (i.e. lab or imaging): PT CALLED IN REQ A NEW ORDER TO DO PT @ AN OUTSIDE FACILITY DUE TO NOT BEING ABLE TO GET IN TO [facility] UNTIL MID JAN. PT SAID HE WOULD BE OK WITH [facility] [place] PHONE# [phone] FAX# [phone]    In what timeframe would the patient need to come in: ASAP     Where will you receive your lab/imaging services: [facility] [place]     Additional notes: PATIENT IS TRYING TO BE SEEN SOONER THAN MID JAN.

## 2021-01-06 DIAGNOSIS — E78.5 DYSLIPIDEMIA: ICD-10-CM

## 2021-01-06 RX ORDER — ROSUVASTATIN CALCIUM 20 MG/1
20 TABLET, COATED ORAL DAILY
Qty: 30 TABLET | Refills: 0 | Status: SHIPPED | OUTPATIENT
Start: 2021-01-06 | End: 2021-02-03

## 2021-01-06 RX ORDER — ROSUVASTATIN CALCIUM 20 MG/1
20 TABLET, COATED ORAL DAILY
Qty: 90 TABLET | OUTPATIENT
Start: 2021-01-06

## 2021-01-17 DIAGNOSIS — M76.52 PATELLAR TENDINITIS, LEFT KNEE: ICD-10-CM

## 2021-01-17 DIAGNOSIS — M25.562 ACUTE PAIN OF LEFT KNEE: ICD-10-CM

## 2021-01-18 ENCOUNTER — OFFICE VISIT (OUTPATIENT)
Dept: SPORTS MEDICINE | Facility: CLINIC | Age: 51
End: 2021-01-18

## 2021-01-18 VITALS
OXYGEN SATURATION: 99 % | HEIGHT: 69 IN | DIASTOLIC BLOOD PRESSURE: 88 MMHG | BODY MASS INDEX: 27.11 KG/M2 | TEMPERATURE: 96.8 F | WEIGHT: 183 LBS | RESPIRATION RATE: 16 BRPM | HEART RATE: 59 BPM | SYSTOLIC BLOOD PRESSURE: 132 MMHG

## 2021-01-18 DIAGNOSIS — M76.52 PATELLAR TENDINITIS, LEFT KNEE: ICD-10-CM

## 2021-01-18 DIAGNOSIS — M25.562 ACUTE PAIN OF LEFT KNEE: Primary | ICD-10-CM

## 2021-01-18 PROCEDURE — 99213 OFFICE O/P EST LOW 20 MIN: CPT | Performed by: FAMILY MEDICINE

## 2021-01-18 RX ORDER — NITROGLYCERIN 0.1MG/HR
PATCH, TRANSDERMAL 24 HOURS TRANSDERMAL
Qty: 30 PATCH | Refills: 0 | OUTPATIENT
Start: 2021-01-18

## 2021-01-18 NOTE — PROGRESS NOTES
"Pedro is a 50 y.o. year old male    Chief Complaint   Patient presents with   • Knee Pain     Follow up on lefe knee pain. Pt sates he feels about the same.        History of Present Illness  4-week follow-up on left patellar tendinitis, partial tendon tear.  He was doing well up until he slipped on a dog toy last week on 2 separate occasions in his house.  He states that he may have been seeing some benefits from the nitroglycerin patch though he was putting it on his kneecap.  He has gone for physical therapy.    I have reviewed the patient's medical, family, and social history in detail and updated the computerized patient record.    Review of Systems  Constitutional: Negative for fever.   Musculoskeletal:        Per HPI   Skin: Negative for rash.   Neurological: Negative for weakness and numbness.   Psychiatric/Behavioral: Negative for sleep disturbance.   All other systems reviewed and are negative.    /88 (BP Location: Left arm, Patient Position: Sitting, Cuff Size: Adult)   Pulse 59   Temp 96.8 °F (36 °C)   Resp 16   Ht 175.3 cm (69.02\")   Wt 83 kg (183 lb)   SpO2 99%   BMI 27.01 kg/m²      Physical Exam    Mask worn thru encounter  Vital signs reviewed.   General: No acute distress.  Eyes: conjunctiva clear; pupils equally round and reactive  ENT: external ears atraumatic  CV: no peripheral edema  Resp: normal respiratory effort, no use of accessory muscles  Skin: no rashes or wounds; normal turgor  Psych: mood and affect appropriate; recent and remote memory intact  Neuro: sensation to light touch intact    MSK Exam:  L knee: No effusion.  Full range of motion.  There is tenderness along the proximal pole of the patellar tendon.  Negative Lachman.  Negative medial, lateral Paige.    Reviewed MRI once again with patient of left knee without contrast.  Interstitial tear of the proximal patellar tendon.    Diagnoses and all orders for this visit:    Acute pain of left knee    Patellar " tendinitis, left knee      Counseled on appropriate placement of nitroglycerin patch at the area of maximal tenderness which coincides with a tear seen on MRI.  I recommend to continue with physical therapy.  Handout given and good discussion regarding possible PRP injection-ACP.    EMR Dragon/Transcription disclaimer:    Much of this encounter note is an electronic transcription/translation of spoken language to printed text.  The electronic translation of spoken language may permit erroneous, or at times, nonsensical words or phrases to be inadvertently transcribed.  Although I have reviewed the note for such errors some may still exist.

## 2021-01-29 ENCOUNTER — TELEPHONE (OUTPATIENT)
Dept: SPORTS MEDICINE | Facility: CLINIC | Age: 51
End: 2021-01-29

## 2021-01-29 NOTE — TELEPHONE ENCOUNTER
Patient called and wanted to know more information about PRP aftercare. He states that  mention a knee immobilizer and is under the impression he wouldn't be able to move or bend his knee for a week. Please advise, and clarify thank you.

## 2021-01-29 NOTE — TELEPHONE ENCOUNTER
Caller: KIZZY LOPEZ    Relationship to patient: SELF    Best call back number: [phone]    Chief complaint: PAIN IN LEFT KNEE    Type of visit: FOLLOW UP KNEE INJ FOR LEFT KNEE    Requested date: ASAP       Additional notes:PATIENT SAYS DR MONTES TOLD HIM TO MENTION THE $500 INJ-ACP INJ-

## 2021-02-02 ENCOUNTER — PROCEDURE VISIT (OUTPATIENT)
Dept: SPORTS MEDICINE | Facility: CLINIC | Age: 51
End: 2021-02-02

## 2021-02-02 VITALS
DIASTOLIC BLOOD PRESSURE: 84 MMHG | OXYGEN SATURATION: 98 % | TEMPERATURE: 98.4 F | WEIGHT: 183 LBS | HEIGHT: 69 IN | RESPIRATION RATE: 15 BRPM | BODY MASS INDEX: 27.11 KG/M2 | SYSTOLIC BLOOD PRESSURE: 125 MMHG | HEART RATE: 57 BPM

## 2021-02-02 DIAGNOSIS — M76.52 PATELLAR TENDINITIS, LEFT KNEE: Primary | ICD-10-CM

## 2021-02-02 PROCEDURE — 0232T NJX PLATELET PLASMA: CPT | Performed by: FAMILY MEDICINE

## 2021-02-02 NOTE — PATIENT INSTRUCTIONS
1. Avoid NSAIDs for 2 weeks.  2. Okay to utilize ice as needed for discomfort.  3. If brace is recommended, please wear as instructed.  Okay to remove during sleep.  4. Initiate physical therapy in 1 week.  5. Follow-up as recommended

## 2021-02-02 NOTE — PROGRESS NOTES
"Knee Injection Procedure Note    Left knee injection was discussed with the patient in detail, including indication, risks, benefits, and alternatives. Verbal consent was given for the procedure. Injection was performed by physician. [facility] protocol used. US used.  Injection site was identified by physical examination and cleaned with Betadine and alcohol swabs. Prior to needle insertion, ethyl chloride spray was used for surface anesthesia. Sterile technique was used.  A 22-gauge, 1.5\" needle was directed to the defect of the proximal patellar tendon from a(n) lateral and anterior approach. Injectate was passed into the joint space without difficulty. The needle was removed and a simple bandage was applied. The procedure was tolerated well without difficulty.    Injection mixture:  PRP 2 mL    Diagnoses and all orders for this visit:    1. Patellar tendinitis, left knee (Primary)          "

## 2021-02-03 DIAGNOSIS — E78.5 DYSLIPIDEMIA: ICD-10-CM

## 2021-02-03 RX ORDER — ROSUVASTATIN CALCIUM 20 MG/1
20 TABLET, COATED ORAL DAILY
Qty: 90 TABLET | Refills: 1 | Status: SHIPPED | OUTPATIENT
Start: 2021-02-03 | End: 2021-07-19

## 2021-03-16 ENCOUNTER — OFFICE VISIT (OUTPATIENT)
Dept: SPORTS MEDICINE | Facility: CLINIC | Age: 51
End: 2021-03-16

## 2021-03-16 VITALS
RESPIRATION RATE: 15 BRPM | WEIGHT: 183 LBS | HEIGHT: 69 IN | HEART RATE: 77 BPM | TEMPERATURE: 98.4 F | BODY MASS INDEX: 27.11 KG/M2 | DIASTOLIC BLOOD PRESSURE: 75 MMHG | SYSTOLIC BLOOD PRESSURE: 114 MMHG | OXYGEN SATURATION: 97 %

## 2021-03-16 DIAGNOSIS — M25.562 CHRONIC PAIN OF LEFT KNEE: Primary | ICD-10-CM

## 2021-03-16 DIAGNOSIS — G89.29 CHRONIC PAIN OF LEFT KNEE: Primary | ICD-10-CM

## 2021-03-16 DIAGNOSIS — M76.52 PATELLAR TENDINITIS, LEFT KNEE: ICD-10-CM

## 2021-03-16 PROCEDURE — 99213 OFFICE O/P EST LOW 20 MIN: CPT | Performed by: FAMILY MEDICINE

## 2021-03-16 NOTE — PROGRESS NOTES
"Chief Complaint  Knee Pain (6 wk follow up - left knee - reinjured patellar tendon after slipping on ice 3 wks ago)    Subjective          Pedro Katz presents to [facility]  History of Present Illness  PRP 2/2/21. Unfortunately, suffered near fall on ice with leg extended approx 3 wks ago. Pain recurred along anterior knee as before. Went back in knee immobilizer.  He did get a very good response from PRP and followed appropriate protocol including immobilization for 1 week and then resumption of PT.  He was seen in the point where he was on a 0 gravity treadmill at home and had started doing some light jogging without knee pain.  Unfortunately, with his slip on the ice, he feels like he is back to square 1.    Objective   Vital Signs:   /75 (BP Location: Right arm, Patient Position: Sitting, Cuff Size: Large Adult)   Pulse 77   Temp 98.4 °F (36.9 °C) (Oral)   Resp 15   Ht 175.3 cm (69\")   Wt 83 kg (183 lb)   SpO2 97%   BMI 27.02 kg/m²     Physical Exam   General: No acute distress  L knee: No effusion.  Full range of motion.  There is tenderness at the proximal pole of the patella tendon.  Negative medial, lateral Paige.  Result Review :                 Assessment and Plan    Diagnoses and all orders for this visit:    1. Chronic pain of left knee (Primary)    2. Patellar tendinitis, left knee    Unfortunately the near slip on the ice caused him a setback in his recovery status post PRP.  He would like to see how an additional 3 weeks of home PT and relative rest to help.  We did discuss potential utility of repeat PRP injection and he will call back after 3 weeks if that is the case.  ACP PRP.      Follow Up   No follow-ups on file.  Patient was given instructions and counseling regarding his condition or for health maintenance advice. Please see specific information pulled into the AVS if appropriate.       "

## 2021-04-02 ENCOUNTER — BULK ORDERING (OUTPATIENT)
Dept: CASE MANAGEMENT | Facility: OTHER | Age: 51
End: 2021-04-02

## 2021-04-02 DIAGNOSIS — Z23 IMMUNIZATION DUE: ICD-10-CM

## 2021-04-06 ENCOUNTER — PROCEDURE VISIT (OUTPATIENT)
Dept: SPORTS MEDICINE | Facility: CLINIC | Age: 51
End: 2021-04-06

## 2021-04-06 VITALS
HEART RATE: 67 BPM | BODY MASS INDEX: 27.11 KG/M2 | TEMPERATURE: 97.5 F | DIASTOLIC BLOOD PRESSURE: 70 MMHG | OXYGEN SATURATION: 98 % | HEIGHT: 69 IN | SYSTOLIC BLOOD PRESSURE: 130 MMHG | WEIGHT: 183 LBS

## 2021-04-06 DIAGNOSIS — M25.562 CHRONIC PAIN OF LEFT KNEE: Primary | ICD-10-CM

## 2021-04-06 DIAGNOSIS — M76.52 PATELLAR TENDINITIS, LEFT KNEE: ICD-10-CM

## 2021-04-06 DIAGNOSIS — G89.29 CHRONIC PAIN OF LEFT KNEE: Primary | ICD-10-CM

## 2021-04-06 PROCEDURE — 0232T NJX PLATELET PLASMA: CPT | Performed by: FAMILY MEDICINE

## 2021-04-06 NOTE — PATIENT INSTRUCTIONS
1. Avoid NSAIDs for 2 weeks.  2. Okay to utilize ice as needed for discomfort.  3. If brace is recommended, please wear as instructed.  Okay to remove during sleep.  4. Initiate physical therapy in 2 weeks.  5. Follow-up as recommended

## 2021-04-06 NOTE — PROGRESS NOTES
"Knee Injection Procedure Note     Left knee injection was discussed with the patient in detail, including indication, risks, benefits, and alternatives. Verbal consent was given for the procedure. Injection was performed by physician. [facility] protocol used. US used.  Injection site was identified by physical examination and cleaned with Betadine and alcohol swabs. Prior to needle insertion, ethyl chloride spray was used for surface anesthesia. Sterile technique was used.  A 22-gauge, 1.5\" needle was directed to the defect of the proximal patellar tendon from a(n) lateral and anterior approach. Injectate was passed into the joint space without difficulty. The needle was removed and a simple bandage was applied. The procedure was tolerated well without difficulty.     Injection mixture:  PRP 2 mL     Diagnoses and all orders for this visit:     1. Patellar tendinitis, left knee (Primary)  "

## 2021-05-14 ENCOUNTER — OFFICE VISIT (OUTPATIENT)
Dept: SPORTS MEDICINE | Facility: CLINIC | Age: 51
End: 2021-05-14

## 2021-05-14 VITALS
HEART RATE: 65 BPM | SYSTOLIC BLOOD PRESSURE: 122 MMHG | OXYGEN SATURATION: 98 % | WEIGHT: 183 LBS | HEIGHT: 69 IN | DIASTOLIC BLOOD PRESSURE: 70 MMHG | TEMPERATURE: 98 F | BODY MASS INDEX: 27.11 KG/M2

## 2021-05-14 DIAGNOSIS — M76.52 PATELLAR TENDINITIS, LEFT KNEE: ICD-10-CM

## 2021-05-14 DIAGNOSIS — M25.562 CHRONIC PAIN OF LEFT KNEE: Primary | ICD-10-CM

## 2021-05-14 DIAGNOSIS — G89.29 CHRONIC PAIN OF LEFT KNEE: Primary | ICD-10-CM

## 2021-05-14 PROCEDURE — 99213 OFFICE O/P EST LOW 20 MIN: CPT | Performed by: FAMILY MEDICINE

## 2021-05-14 NOTE — PROGRESS NOTES
"Chief Complaint  Knee Pain (f/u for LT knee pain that has been chronic - patient states knee is worse than last visit which PRP was performed - here for further evaluation and treatment )    Subjective          Pedro Katz presents to [facility]  History of Present Illness  \"I am frustrated\".  He states he feels he is actually regressed following the second PRP injection.  He did attempt to run over the last week and very short distance though this resulted in severe pain.  Pain is similar in intensity along the anterior knee.  He has since had pain going up and down stairs.  Has failed physical therapy, Nitropatch, 2 PRP injections.    Objective   Vital Signs:   /70 (BP Location: Right arm, Patient Position: Sitting, Cuff Size: Adult)   Pulse 65   Temp 98 °F (36.7 °C)   Ht 175.3 cm (69.02\")   Wt 83 kg (183 lb)   SpO2 98%   BMI 27.01 kg/m²     Physical Exam   General: No acute distress.  L knee: No effusion.  Full range of motion.  Retropatellar crepitus.  There is tenderness along the proximal pole of the patella tendon.  Negative medial, lateral Paige.  No varus valgus laxity.    Result Review :              Reviewed once again MRI left knee without contrast December 2020.  Chronic appearing interstitial tear of the proximal patella tendon.       Assessment and Plan    Diagnoses and all orders for this visit:    1. Chronic pain of left knee (Primary)  -     Ambulatory Referral to Orthopedic Surgery    2. Patellar tendinitis, left knee  -     Ambulatory Referral to Orthopedic Surgery    Unfortunately, has failed conservative treatments as listed above.  He has not had as good response to PRP as we both had hoped.  I do feel like he had a decent response to the first PRP, however he unfortunately fell on ice which did not trigger an exacerbation of his pain.  I did discuss other injection options though explained that at this time, we cannot officially offer BMAC " within the [facility] system.  He is hesitant to consider injection options further and would like to discuss surgical options.  Referral made.      Follow Up   No follow-ups on file.  Patient was given instructions and counseling regarding his condition or for health maintenance advice. Please see specific information pulled into the AVS if appropriate.

## 2021-06-17 ENCOUNTER — OFFICE VISIT (OUTPATIENT)
Dept: ORTHOPEDIC SURGERY | Facility: CLINIC | Age: 51
End: 2021-06-17

## 2021-06-17 VITALS
HEIGHT: 69 IN | HEART RATE: 65 BPM | WEIGHT: 183 LBS | BODY MASS INDEX: 27.11 KG/M2 | DIASTOLIC BLOOD PRESSURE: 64 MMHG | SYSTOLIC BLOOD PRESSURE: 120 MMHG

## 2021-06-17 DIAGNOSIS — S86.812A PATELLAR TENDON RUPTURE, LEFT, INITIAL ENCOUNTER: Primary | ICD-10-CM

## 2021-06-17 PROCEDURE — 99204 OFFICE O/P NEW MOD 45 MIN: CPT | Performed by: ORTHOPAEDIC SURGERY

## 2021-06-17 RX ORDER — PREGABALIN 150 MG/1
150 CAPSULE ORAL ONCE
Status: CANCELLED | OUTPATIENT
Start: 2021-06-17 | End: 2021-06-17

## 2021-06-17 RX ORDER — ACETAMINOPHEN 325 MG/1
1000 TABLET ORAL ONCE
Status: CANCELLED | OUTPATIENT
Start: 2021-06-17 | End: 2021-06-17

## 2021-06-17 RX ORDER — MELOXICAM 7.5 MG/1
15 TABLET ORAL ONCE
Status: CANCELLED | OUTPATIENT
Start: 2021-06-17 | End: 2021-06-17

## 2021-06-17 NOTE — PROGRESS NOTES
"Subjective:     Patient ID: Pedro Katz is a 50 y.o. male.    Chief Complaint: Left knee pain, new patient, referred by Armen Castro Jr. DO  Last PRP injection left knee 4/6/2021    History of Present Illness  Pedro Katz presents to clinic today for evaluation of left knee pain following twisting his ankle on stairs carrying drywall in June or July 2020. He had multiple re-injuries following the initial as he increased his activity. He has undergone two PRP injections, bracing, and physical therapy with Armen Castro Jr. DO without relief. His worst problems are going down stairs and running. The pain is localized to the anterior without radiation. His pain is rated 5/10 in severity today and is aching in quality.  The pain is aggravated by stairs, deep flexion, and squatting but alleviated by rest. Denies tingling or numbness outside the anterior knee.     Social History     Occupational History   • Not on file   Tobacco Use   • Smoking status: Never Smoker   • Smokeless tobacco: Never Used   Vaping Use   • Vaping Use: Never used   Substance and Sexual Activity   • Alcohol use: Yes     Comment: social    • Drug use: Defer   • Sexual activity: Defer      Past Medical History:   Diagnosis Date   • Humerus fracture     as a child   • Seizure (CMS/HCC)     had a seizure in 1999   • Skin cancer      Past Surgical History:   Procedure Laterality Date   • FASCIOTOMY Left 2002       History reviewed. No pertinent family history.      Review of Systems        Objective:  Vitals:    06/17/21 1225   BP: 120/64   Pulse: 65   Weight: 83 kg (183 lb)   Height: 175.3 cm (69\")         06/17/21  1225   Weight: 83 kg (183 lb)     Body mass index is 27.02 kg/m².  Physical Exam    Vital signs reviewed.   General: No acute distress, alert and oriented  Eyes: conjunctiva clear; pupils equally round and reactive  ENT: external ears and nose atraumatic; oropharynx clear  CV: no peripheral edema  Resp: normal " respiratory effort  Skin: no rashes or wounds; normal turgor  Psych: mood and affect appropriate; recent and remote memory intact          Ortho Exam     Left Knee-    ROM 0-130 degrees  4+/5 on flexion  4+/5 on extension  Maximal tenderness superior patellar tendon in mid-line  Paige- negative  Mild to moderate prominence noted along inferior pole of the patella asymmetric to right side    Effusion- minimal   Grade 1A [place]  Anterior drawer- negative  Posterior drawer- negative   Stable opening on varus and valgus stress at 0 and 30  Active patellar compression test- positive - minimally     Positive sensation light tough all distributions symmetric to contralateral side  Brisk cap refill all digits  2+ dorsalis pedis pulse          Imaging:  MRI Knee Left without Contrast  IMPRESSION:  1. Left proximal patellar tendinitis with a partial-thickness  interstitial tear at the midline, dimensions as above. Most of the axial  area of the patellar tendon remains intact.  2. Incidentally noted ganglion or synovial cyst along the posteromedial  side of the knee is favored to be arising from the medial edge of the  bursa.  3. No other internal derangement is present.     This report was finalized on 12/17/2020 9:28 AM by Dr. Rao Powell M.D.     Left Knee X-Ray from outside facility dated November 2020  Indication: Pain     Views: AP, Lateral, and [place]     Findings:  No fracture  No bony lesion  Normal soft tissues  Normal joint spaces     No prior studies were available for comparison.  Signed Armen Castro Jr. DO on 11/25/2020    Review of x-rays left knee as well as MRI left knee indicates partial-thickness interstitial tearing of left proximal patellar tendon in midline position, no evidence of meniscal, cruciate, collateral ligament injury.    Assessment:        1. Patellar tendon rupture, left, initial encounter- PARTIAL           Plan:          1. Discussed treatment options at length with  patient at today's visit including arthroscopy with patellar tendon debridement with possible grafting.  Given failure of other conservative treatment options including but not limited to rest, bracing, anti-inflammatory medications, and multiple PRP injections, patient would like to proceed with surgical treatment.  2. Plan will be for left knee arthroscopy, patellar tendon debridement and repair, possible grafting, and all associated procedures.  I explained to patient details of the procedure as well as risks, benefits, and alternatives the procedure, with risks including but not limited to neurovascular damage, bleeding, infection, chronic pain, re-tear of the tendon repair, disease transmission, loss of motion, weakness, stiffness, swelling, DVT, pulmonary embolus, death, stroke, complex regional pain syndrome, myocardial infarction, need for additional procedures.  Patient understood all these and had all questions answered prior to verbally consenting to proceed with surgery.  No guarantees were given in regards to results of procedure.  3. Patient denies past medical history of blood clots, cardiac issues, or diabetes mellitus.       Pedro Katz was in agreement with plan and had all questions answered.     Orders:  Orders Placed This Encounter   Procedures   • Protime-INR   • APTT   • Basic metabolic panel   • Follow anesthesia standing orders.   • Provide instructions to patient regarding NPO status   • ECG 12 Lead   • CBC and Differential       Medications:  No orders of the defined types were placed in this encounter.      Followup:  No follow-ups on file.    Diagnoses and all orders for this visit:    1. Patellar tendon rupture, left, initial encounter- PARTIAL (Primary)  -     Case Request; Standing  -     CBC and Differential; Future  -     Protime-INR; Future  -     APTT; Future  -     acetaminophen (TYLENOL) tablet 975 mg  -     meloxicam (MOBIC) tablet 15 mg  -     pregabalin (LYRICA) capsule  150 mg  -     Basic metabolic panel; Future  -     ECG 12 Lead; Future  -     ceFAZolin (ANCEF) 2 g in sodium chloride 0.9 % 100 mL IVPB  -     Case Request    Other orders  -     Follow anesthesia standing orders.  -     Provide instructions to patient regarding NPO status  -     Follow Anesthesia Guidelines / Standing Orders; Standing  -     Verify NPO Status; Standing  -     SCD (sequential compression device)- to be placed on patient in Pre-op; Standing  -     Clip operative site; Standing  -     Obtain informed consent (if not collected inpatient or PAT); Standing        SCRIBE ATTESTATION:  IFer, attest that all medical record entries for this patient were documented by me acting as a medical scribe for Eric Lopez MD.    PROVIDER ATTESTATION:  IEric MD, personally performed the services described in this documentation. All medical record entries made by the scribe were at my direction and in my presence. I have reviewed the chart and discharge instructions and agree that the record reflects my personal performance and is accurate and complete.  Eric oLpez MD.    Electronically signed: Eric Lopez MD 6/17/2021 23:20 EDT       Dictated utilizing Dragon dictation

## 2021-06-28 ENCOUNTER — TELEPHONE (OUTPATIENT)
Dept: ORTHOPEDIC SURGERY | Facility: CLINIC | Age: 51
End: 2021-06-28

## 2021-07-18 DIAGNOSIS — E78.5 DYSLIPIDEMIA: ICD-10-CM

## 2021-07-19 RX ORDER — ROSUVASTATIN CALCIUM 20 MG/1
20 TABLET, COATED ORAL DAILY
Qty: 90 TABLET | Refills: 1 | Status: SHIPPED | OUTPATIENT
Start: 2021-07-19 | End: 2021-09-24 | Stop reason: SDUPTHER

## 2021-07-30 ENCOUNTER — TRANSCRIBE ORDERS (OUTPATIENT)
Dept: ADMINISTRATIVE | Facility: HOSPITAL | Age: 51
End: 2021-07-30

## 2021-07-30 DIAGNOSIS — U07.1 COVID-19: Primary | ICD-10-CM

## 2021-08-24 ENCOUNTER — OFFICE VISIT (OUTPATIENT)
Dept: SPORTS MEDICINE | Facility: CLINIC | Age: 51
End: 2021-08-24

## 2021-08-24 DIAGNOSIS — Z20.822 CLOSE EXPOSURE TO COVID-19 VIRUS: ICD-10-CM

## 2021-08-24 DIAGNOSIS — J01.00 ACUTE NON-RECURRENT MAXILLARY SINUSITIS: Primary | ICD-10-CM

## 2021-08-24 PROCEDURE — 99441 PR PHYS/QHP TELEPHONE EVALUATION 5-10 MIN: CPT | Performed by: FAMILY MEDICINE

## 2021-08-24 RX ORDER — DOXYCYCLINE 100 MG/1
100 CAPSULE ORAL EVERY 12 HOURS SCHEDULED
Qty: 20 CAPSULE | Refills: 0 | Status: SHIPPED | OUTPATIENT
Start: 2021-08-24 | End: 2021-09-03

## 2021-08-24 RX ORDER — IPRATROPIUM BROMIDE 42 UG/1
2 SPRAY, METERED NASAL 3 TIMES DAILY PRN
Qty: 15 ML | Refills: 0 | Status: SHIPPED | OUTPATIENT
Start: 2021-08-24 | End: 2021-10-07

## 2021-08-24 NOTE — PROGRESS NOTES
Telephone Visit Note    Chief Complaint   Patient presents with   • Sinusitis     eval for possible sinus infection - sxs present since 08/22/2021 - reports having a cough and chills, no fevers - reports taking a rapid covid test yesterday and stated it was negative           History of Present Illness  Last documented sinus infection treated by me 9/25/2019.  10-day course of doxycycline.  Has received Covid vaccines - January, February 2021. Works at [employer] -wears mask indoors.  Was at a wake Sat, approx 200 ppl there. Wore mask.      Diagnoses and all orders for this visit:    Acute non-recurrent maxillary sinusitis  -     doxycycline (MONODOX) 100 MG capsule; Take 1 capsule by mouth Every 12 (Twelve) Hours for 10 days.  -     ipratropium (ATROVENT) 0.06 % nasal spray; 2 sprays into the nostril(s) as directed by provider 3 (Three) Times a Day As Needed for Rhinitis.    Close exposure to COVID-19 virus    Warning signs given should his symptoms worsen over the next 24 hours.  He will continue to stay at home over this timeframe.  If he wakes up with similar symptoms or better, I think he is safe to return to work.  He has been fully vaccinated.  If his symptoms persist greater than 5 to 7 days, he will then initiate antibiotic.Nose spray sent in as needed.      This patient was evaluated by telephone due to current precautions with COVID-19.  Consent to telephone visit for this problem was given by the patient. I spent a total of 9 minutes on the phone with the patient.

## 2021-09-24 DIAGNOSIS — G47.00 INSOMNIA, UNSPECIFIED TYPE: ICD-10-CM

## 2021-09-24 DIAGNOSIS — J01.00 ACUTE NON-RECURRENT MAXILLARY SINUSITIS: ICD-10-CM

## 2021-09-24 DIAGNOSIS — E78.5 DYSLIPIDEMIA: ICD-10-CM

## 2021-09-24 DIAGNOSIS — F34.1 DYSTHYMIA: ICD-10-CM

## 2021-09-24 RX ORDER — FLUOXETINE HYDROCHLORIDE 20 MG/1
20 CAPSULE ORAL DAILY
Qty: 90 CAPSULE | Refills: 3 | Status: SHIPPED | OUTPATIENT
Start: 2021-09-24 | End: 2022-01-11 | Stop reason: SDUPTHER

## 2021-09-24 RX ORDER — ROSUVASTATIN CALCIUM 20 MG/1
20 TABLET, COATED ORAL DAILY
Qty: 90 TABLET | Refills: 3 | Status: SHIPPED | OUTPATIENT
Start: 2021-09-24 | End: 2022-11-15

## 2021-09-24 RX ORDER — TRAZODONE HYDROCHLORIDE 50 MG/1
50 TABLET ORAL NIGHTLY
Qty: 90 TABLET | Refills: 3 | Status: SHIPPED | OUTPATIENT
Start: 2021-09-24 | End: 2022-01-21 | Stop reason: SDUPTHER

## 2021-10-07 ENCOUNTER — OFFICE VISIT (OUTPATIENT)
Dept: SPORTS MEDICINE | Facility: CLINIC | Age: 51
End: 2021-10-07

## 2021-10-07 ENCOUNTER — PRE-ADMISSION TESTING (OUTPATIENT)
Dept: PREADMISSION TESTING | Facility: HOSPITAL | Age: 51
End: 2021-10-07

## 2021-10-07 VITALS
DIASTOLIC BLOOD PRESSURE: 80 MMHG | OXYGEN SATURATION: 99 % | WEIGHT: 188 LBS | SYSTOLIC BLOOD PRESSURE: 134 MMHG | TEMPERATURE: 96.7 F | HEIGHT: 69 IN | HEART RATE: 68 BPM | BODY MASS INDEX: 27.85 KG/M2

## 2021-10-07 VITALS
SYSTOLIC BLOOD PRESSURE: 125 MMHG | WEIGHT: 189.1 LBS | BODY MASS INDEX: 28.01 KG/M2 | RESPIRATION RATE: 16 BRPM | DIASTOLIC BLOOD PRESSURE: 86 MMHG | HEIGHT: 69 IN | OXYGEN SATURATION: 98 % | HEART RATE: 67 BPM

## 2021-10-07 DIAGNOSIS — Z01.818 PREOP EXAMINATION: Primary | ICD-10-CM

## 2021-10-07 DIAGNOSIS — M76.52 PATELLAR TENDINITIS, LEFT KNEE: ICD-10-CM

## 2021-10-07 DIAGNOSIS — S86.812A PATELLAR TENDON RUPTURE, LEFT, INITIAL ENCOUNTER: ICD-10-CM

## 2021-10-07 LAB — QT INTERVAL: 408 MS

## 2021-10-07 PROCEDURE — 85730 THROMBOPLASTIN TIME PARTIAL: CPT | Performed by: ORTHOPAEDIC SURGERY

## 2021-10-07 PROCEDURE — 93005 ELECTROCARDIOGRAM TRACING: CPT

## 2021-10-07 PROCEDURE — 80048 BASIC METABOLIC PNL TOTAL CA: CPT | Performed by: ORTHOPAEDIC SURGERY

## 2021-10-07 PROCEDURE — 99213 OFFICE O/P EST LOW 20 MIN: CPT | Performed by: FAMILY MEDICINE

## 2021-10-07 PROCEDURE — 85610 PROTHROMBIN TIME: CPT | Performed by: ORTHOPAEDIC SURGERY

## 2021-10-07 PROCEDURE — 93010 ELECTROCARDIOGRAM REPORT: CPT | Performed by: INTERNAL MEDICINE

## 2021-10-07 PROCEDURE — 85025 COMPLETE CBC W/AUTO DIFF WBC: CPT | Performed by: ORTHOPAEDIC SURGERY

## 2021-10-07 NOTE — DISCHARGE INSTRUCTIONS
PRE-ADMISSION TESTING INSTRUCTIONS FOR ADULTS    Take these medications the morning of surgery with a small sip of water: fluoxetine, crestor      No aspirin, advil, aleve, ibuprofen, naproxen, diet pills, decongestants, or herbal/vitamins for a week prior to surgery.    General Instructions:    • Do not eat solid food after midnight the night before surgery.  No gum, mints, or hard candy after midnight the night before surgery.  • You may drink clear liquids the day of surgery up until 2 hours before your arrival time.  • Clear liquids are liquids you can see through. Nothing RED in color.    Plain water    Sports drinks  Sodas     Gelatin (Jell-O)  Fruit juices without pulp such as white grape juice and apple juice  Popsicles that contain no fruit or yogurt  Tea or coffee (no cream or milk added)    • It is beneficial for you to have a clear drink that contains carbohydrates just before you leave your house and before your fasting time begins.  We suggest a 20 ounce bottle of Gatorade or Powerade for non-diabetic patients or a 20 ounce bottle of G2 or Powerade Zero for diabetic patients.     • Patients who avoid smoking, chewing tobacco and alcohol for 4 weeks prior to surgery have a reduced risk of post-operative complications.  If at all possible, quit smoking as many days before surgery as you can.    • Do not smoke, use chewing tobacco or drink alcohol the day of surgery    • Bring your C-PAP/ BI-PAP machine if you use one.  • Wear clean comfortable clothes and socks.  • Do not wear contact lenses, lotion, deodorant, or make-up.  Bring a case for your glasses if applicable. You may brush your teeth the morning of surgery.  • You may wear dentures/partials, do not put adhesive/glue on them.    • Leave all other jewelry and valuables at home.      Preventing a Surgical Site Infection:    • Shower the night before and on the morning of surgery using the chlorhexidine soap you were given.  Use a clean washcloth  with the soap.  Place clean sheets on your bed after showering the night before surgery. Do not use the CHG soap on your hair, face, or private areas. Wash your body gently for five (5) minutes. Do not scrub your skin.  Dry with a clean towel and dress in clean clothing.    • Do not shave the surgical area for 10 days-2 weeks prior to surgery  because the razor can irritate skin and make it easier to develop an infection.  • Make sure you, your family, and all healthcare providers clean their hands with soap and water or an alcohol based hand  before caring for you or your wound.      Day of surgery:    Your surgeon’s office will advise you of your arrival time for the day of surgery.    Upon arrival, a Pre-op nurse and Anesthesia provider will review your health history, obtain vital signs, and answer questions you may have.  The only belongings needed at this time will be your home medications and if applicable your C-PAP/BI-PAP machine.  If you are staying overnight your family can leave the rest of your belongings in the car and bring them to your room later.  A Pre-op nurse will start an IV and you may receive medication in preparation for surgery, including something to help you relax.  Your family will be able to see you in the Pre-op area.  While you are in surgery your family should notify the waiting room  if they leave the waiting room area and provide a contact phone number.    IF you have any questions, you can call the Pre-Admission Department at [phone] or your surgeon's office.  Notify your surgeon if  you become sick, have a fever, productive cough, or cannot be here the day of surgery    Please be aware that surgery does come with discomfort.  We want to make every effort to control your discomfort so please discuss any uncontrolled symptoms with your nurse.   Your doctor will most likely have prescribed pain medications.      If you are going home after surgery, you  will receive individualized written care instructions before being discharged.  A responsible adult (over the age of 18) must drive you to and from the hospital on the day of your surgery and stay with you for 24 hours after anesthesia.    If you are staying overnight following surgery, you will be transported to your hospital room following the recovery period.  [facility] has all private rooms.    You may receive a survey regarding the care you received. Your feedback is very important and will be used to collect the necessary data to help us to continue to provide excellent care.     Deductibles and co-payments are collected on the day of service. Please be prepared to pay the required co-pay, deductible or deposit on the day of service as defined by your plan.

## 2021-10-07 NOTE — PROGRESS NOTES
"Pedro is a 50 y.o. year old male presents to [facility]    Chief Complaint   Patient presents with   • Surgical Clearance     LT knee surgery arthroscopic and needs clearance       History of Present Illness  Pending patellar tendon reconstruction 10/22/2021. Patient has no cardiac or pulmonary conditions. He is on Crestor long-term for hyperlipidemia. No sleep apnea.    I have reviewed the patient's medical, family, and social history in detail and updated the computerized patient record.    /80 (BP Location: Right arm, Patient Position: Sitting, Cuff Size: Adult)   Pulse 68   Temp 96.7 °F (35.9 °C) (Temporal)   Ht 175.3 cm (69.02\")   Wt 85.3 kg (188 lb)   SpO2 99%   BMI 27.75 kg/m²      Physical Exam    Mask worn thru encounter  Vital signs reviewed.   General: No acute distress.  Eyes: conjunctiva clear; pupils equally round and reactive  ENT: external ears atraumatic  CV: no peripheral edema; S1, S2 no murmurs/rubs or gallops.  Resp: normal respiratory effort, no use of accessory muscles; clear to auscultation bilaterally  Skin: no rashes or wounds; normal turgor  Psych: mood and affect appropriate; recent and remote memory intact  Neuro: sensation to light touch intact                    Diagnoses and all orders for this visit:    Preop examination    Patellar tendinitis, left knee    Based on current guidelines, no further work-up indicated at this time.  Has no chronic cardiac conditions.  Note sent to orthopedic surgeon.      Follow Up   No follow-ups on file.  Patient was given instructions and counseling regarding his condition or for health maintenance advice. Please see specific information pulled into the AVS if appropriate.     EMR Dragon/Transcription disclaimer:    Much of this encounter note is an electronic transcription/translation of spoken language to printed text.  The electronic translation of spoken language may permit erroneous, or at times, " nonsensical words or phrases to be inadvertently transcribed.  Although I have reviewed the note for such errors some may still exist.

## 2021-10-15 ENCOUNTER — ANESTHESIA EVENT (OUTPATIENT)
Dept: PERIOP | Facility: HOSPITAL | Age: 51
End: 2021-10-15

## 2021-10-19 ENCOUNTER — PREP FOR SURGERY (OUTPATIENT)
Dept: OTHER | Facility: HOSPITAL | Age: 51
End: 2021-10-19

## 2021-10-19 DIAGNOSIS — S86.812A PATELLAR TENDON RUPTURE, LEFT, INITIAL ENCOUNTER: Primary | ICD-10-CM

## 2021-10-20 ENCOUNTER — LAB (OUTPATIENT)
Dept: LAB | Facility: HOSPITAL | Age: 51
End: 2021-10-20

## 2021-10-20 DIAGNOSIS — S86.812A PATELLAR TENDON RUPTURE, LEFT, INITIAL ENCOUNTER: ICD-10-CM

## 2021-10-20 LAB — SARS-COV-2 RNA PNL SPEC NAA+PROBE: NOT DETECTED

## 2021-10-20 PROCEDURE — C9803 HOPD COVID-19 SPEC COLLECT: HCPCS

## 2021-10-20 PROCEDURE — 87635 SARS-COV-2 COVID-19 AMP PRB: CPT | Performed by: ORTHOPAEDIC SURGERY

## 2021-10-22 ENCOUNTER — APPOINTMENT (OUTPATIENT)
Dept: GENERAL RADIOLOGY | Facility: HOSPITAL | Age: 51
End: 2021-10-22

## 2021-10-22 ENCOUNTER — ANESTHESIA (OUTPATIENT)
Dept: PERIOP | Facility: HOSPITAL | Age: 51
End: 2021-10-22

## 2021-10-22 ENCOUNTER — HOSPITAL ENCOUNTER (OUTPATIENT)
Facility: HOSPITAL | Age: 51
Setting detail: HOSPITAL OUTPATIENT SURGERY
Discharge: HOME | End: 2021-10-22
Attending: ORTHOPAEDIC SURGERY | Admitting: ORTHOPAEDIC SURGERY

## 2021-10-22 VITALS
OXYGEN SATURATION: 93 % | BODY MASS INDEX: 28.61 KG/M2 | TEMPERATURE: 98.1 F | DIASTOLIC BLOOD PRESSURE: 76 MMHG | RESPIRATION RATE: 16 BRPM | HEART RATE: 51 BPM | WEIGHT: 193.8 LBS | SYSTOLIC BLOOD PRESSURE: 115 MMHG

## 2021-10-22 DIAGNOSIS — S86.812A PATELLAR TENDON RUPTURE, LEFT, INITIAL ENCOUNTER: ICD-10-CM

## 2021-10-22 PROCEDURE — 27380 REPAIR OF KNEECAP TENDON: CPT | Performed by: SPECIALIST/TECHNOLOGIST, OTHER

## 2021-10-22 PROCEDURE — C1713 ANCHOR/SCREW BN/BN,TIS/BN: HCPCS | Performed by: ORTHOPAEDIC SURGERY

## 2021-10-22 PROCEDURE — 25010000002 ONDANSETRON PER 1 MG: Performed by: NURSE ANESTHETIST, CERTIFIED REGISTERED

## 2021-10-22 PROCEDURE — 25010000003 LIDOCAINE 1 % SOLUTION 2 ML VIAL: Performed by: ORTHOPAEDIC SURGERY

## 2021-10-22 PROCEDURE — 25010000002 MORPHINE PER 10 MG: Performed by: ORTHOPAEDIC SURGERY

## 2021-10-22 PROCEDURE — 25010000002 DEXAMETHASONE PER 1 MG: Performed by: NURSE ANESTHETIST, CERTIFIED REGISTERED

## 2021-10-22 PROCEDURE — 25010000002 MIDAZOLAM PER 1MG: Performed by: NURSE ANESTHETIST, CERTIFIED REGISTERED

## 2021-10-22 PROCEDURE — 25010000003 CEFAZOLIN SODIUM-DEXTROSE 2-3 GM-%(50ML) RECONSTITUTED SOLUTION: Performed by: ORTHOPAEDIC SURGERY

## 2021-10-22 PROCEDURE — C1763 CONN TISS, NON-HUMAN: HCPCS | Performed by: ORTHOPAEDIC SURGERY

## 2021-10-22 PROCEDURE — C1889 IMPLANT/INSERT DEVICE, NOC: HCPCS | Performed by: ORTHOPAEDIC SURGERY

## 2021-10-22 PROCEDURE — 29877 ARTHRS KNEE SURG DBRDMT/SHVG: CPT | Performed by: SPECIALIST/TECHNOLOGIST, OTHER

## 2021-10-22 PROCEDURE — 27380 REPAIR OF KNEECAP TENDON: CPT | Performed by: ORTHOPAEDIC SURGERY

## 2021-10-22 PROCEDURE — 76942 ECHO GUIDE FOR BIOPSY: CPT | Performed by: ORTHOPAEDIC SURGERY

## 2021-10-22 PROCEDURE — 25010000002 PROPOFOL 10 MG/ML EMULSION: Performed by: NURSE ANESTHETIST, CERTIFIED REGISTERED

## 2021-10-22 PROCEDURE — 29877 ARTHRS KNEE SURG DBRDMT/SHVG: CPT | Performed by: ORTHOPAEDIC SURGERY

## 2021-10-22 DEVICE — 1.8MM Q-FIX ALL SUTURE ANCHOR
Type: IMPLANTABLE DEVICE | Site: KNEE | Status: FUNCTIONAL
Brand: Q-FIX

## 2021-10-22 DEVICE — IMPLANTABLE DEVICE
Type: IMPLANTABLE DEVICE | Site: KNEE | Status: FUNCTIONAL
Brand: BIOINDUCTIVE IMPLANT WITH ARTHROSCOPIC DELIVERY SYSTEM - MEDIUM

## 2021-10-22 DEVICE — DEV CONTRL TISS STRATAFIX SPIRAL MNCRYL UD 3/0 PLS 45CM: Type: IMPLANTABLE DEVICE | Site: KNEE | Status: FUNCTIONAL

## 2021-10-22 RX ORDER — LIDOCAINE HYDROCHLORIDE AND EPINEPHRINE 10; 10 MG/ML; UG/ML
INJECTION, SOLUTION INFILTRATION; PERINEURAL AS NEEDED
Status: DISCONTINUED | OUTPATIENT
Start: 2021-10-22 | End: 2021-10-22 | Stop reason: HOSPADM

## 2021-10-22 RX ORDER — MIDAZOLAM HYDROCHLORIDE 2 MG/2ML
1 INJECTION, SOLUTION INTRAMUSCULAR; INTRAVENOUS
Status: DISCONTINUED | OUTPATIENT
Start: 2021-10-22 | End: 2021-10-22 | Stop reason: HOSPADM

## 2021-10-22 RX ORDER — DEXMEDETOMIDINE HYDROCHLORIDE 100 UG/ML
INJECTION, SOLUTION INTRAVENOUS AS NEEDED
Status: DISCONTINUED | OUTPATIENT
Start: 2021-10-22 | End: 2021-10-22 | Stop reason: SURG

## 2021-10-22 RX ORDER — HYDROCODONE BITARTRATE AND ACETAMINOPHEN 7.5; 325 MG/1; MG/1
1 TABLET ORAL ONCE AS NEEDED
Status: COMPLETED | OUTPATIENT
Start: 2021-10-22 | End: 2021-10-22

## 2021-10-22 RX ORDER — ACETAMINOPHEN 500 MG
1000 TABLET ORAL ONCE
Status: COMPLETED | OUTPATIENT
Start: 2021-10-22 | End: 2021-10-22

## 2021-10-22 RX ORDER — SODIUM CHLORIDE 0.9 % (FLUSH) 0.9 %
10 SYRINGE (ML) INJECTION EVERY 12 HOURS SCHEDULED
Status: DISCONTINUED | OUTPATIENT
Start: 2021-10-22 | End: 2021-10-22 | Stop reason: HOSPADM

## 2021-10-22 RX ORDER — MAGNESIUM HYDROXIDE 1200 MG/15ML
LIQUID ORAL AS NEEDED
Status: DISCONTINUED | OUTPATIENT
Start: 2021-10-22 | End: 2021-10-22 | Stop reason: HOSPADM

## 2021-10-22 RX ORDER — ONDANSETRON 2 MG/ML
4 INJECTION INTRAMUSCULAR; INTRAVENOUS ONCE AS NEEDED
Status: DISCONTINUED | OUTPATIENT
Start: 2021-10-22 | End: 2021-10-22 | Stop reason: HOSPADM

## 2021-10-22 RX ORDER — GLYCOPYRROLATE 0.2 MG/ML
INJECTION INTRAMUSCULAR; INTRAVENOUS AS NEEDED
Status: DISCONTINUED | OUTPATIENT
Start: 2021-10-22 | End: 2021-10-22 | Stop reason: SURG

## 2021-10-22 RX ORDER — SODIUM CHLORIDE 9 MG/ML
40 INJECTION, SOLUTION INTRAVENOUS AS NEEDED
Status: DISCONTINUED | OUTPATIENT
Start: 2021-10-22 | End: 2021-10-22 | Stop reason: HOSPADM

## 2021-10-22 RX ORDER — BUPIVACAINE HYDROCHLORIDE 7.5 MG/ML
INJECTION, SOLUTION EPIDURAL; RETROBULBAR
Status: COMPLETED | OUTPATIENT
Start: 2021-10-22 | End: 2021-10-22

## 2021-10-22 RX ORDER — MELOXICAM 7.5 MG/1
15 TABLET ORAL ONCE
Status: COMPLETED | OUTPATIENT
Start: 2021-10-22 | End: 2021-10-22

## 2021-10-22 RX ORDER — LIDOCAINE HYDROCHLORIDE 10 MG/ML
0.5 INJECTION, SOLUTION EPIDURAL; INFILTRATION; INTRACAUDAL; PERINEURAL ONCE AS NEEDED
Status: DISCONTINUED | OUTPATIENT
Start: 2021-10-22 | End: 2021-10-22 | Stop reason: HOSPADM

## 2021-10-22 RX ORDER — ONDANSETRON 2 MG/ML
4 INJECTION INTRAMUSCULAR; INTRAVENOUS ONCE AS NEEDED
Status: COMPLETED | OUTPATIENT
Start: 2021-10-22 | End: 2021-10-22

## 2021-10-22 RX ORDER — CEFAZOLIN SODIUM 2 G/50ML
2 SOLUTION INTRAVENOUS ONCE
Status: COMPLETED | OUTPATIENT
Start: 2021-10-22 | End: 2021-10-22

## 2021-10-22 RX ORDER — BUPIVACAINE HYDROCHLORIDE 5 MG/ML
INJECTION, SOLUTION EPIDURAL; INTRACAUDAL
Status: COMPLETED | OUTPATIENT
Start: 2021-10-22 | End: 2021-10-22

## 2021-10-22 RX ORDER — PROPOFOL 10 MG/ML
VIAL (ML) INTRAVENOUS CONTINUOUS PRN
Status: DISCONTINUED | OUTPATIENT
Start: 2021-10-22 | End: 2021-10-22 | Stop reason: SURG

## 2021-10-22 RX ORDER — PREGABALIN 75 MG/1
150 CAPSULE ORAL ONCE
Status: COMPLETED | OUTPATIENT
Start: 2021-10-22 | End: 2021-10-22

## 2021-10-22 RX ORDER — KETAMINE HYDROCHLORIDE 10 MG/ML
INJECTION INTRAMUSCULAR; INTRAVENOUS AS NEEDED
Status: DISCONTINUED | OUTPATIENT
Start: 2021-10-22 | End: 2021-10-22 | Stop reason: SURG

## 2021-10-22 RX ORDER — FAMOTIDINE 10 MG/ML
20 INJECTION, SOLUTION INTRAVENOUS
Status: COMPLETED | OUTPATIENT
Start: 2021-10-22 | End: 2021-10-22

## 2021-10-22 RX ORDER — ASPIRIN 81 MG/1
81 TABLET ORAL DAILY
Qty: 14 TABLET | Refills: 0 | Status: SHIPPED | OUTPATIENT
Start: 2021-10-22 | End: 2021-11-18

## 2021-10-22 RX ORDER — FENTANYL CITRATE 50 UG/ML
50 INJECTION, SOLUTION INTRAMUSCULAR; INTRAVENOUS
Status: DISCONTINUED | OUTPATIENT
Start: 2021-10-22 | End: 2021-10-22 | Stop reason: HOSPADM

## 2021-10-22 RX ORDER — BUPIVACAINE HYDROCHLORIDE 2.5 MG/ML
INJECTION, SOLUTION EPIDURAL; INFILTRATION; INTRACAUDAL
Status: COMPLETED | OUTPATIENT
Start: 2021-10-22 | End: 2021-10-22

## 2021-10-22 RX ORDER — HYDROCODONE BITARTRATE AND ACETAMINOPHEN 7.5; 325 MG/1; MG/1
1-2 TABLET ORAL EVERY 4 HOURS PRN
Qty: 40 TABLET | Refills: 0 | Status: SHIPPED | OUTPATIENT
Start: 2021-10-22 | End: 2021-10-26 | Stop reason: SDUPTHER

## 2021-10-22 RX ORDER — SODIUM CHLORIDE 0.9 % (FLUSH) 0.9 %
10 SYRINGE (ML) INJECTION AS NEEDED
Status: DISCONTINUED | OUTPATIENT
Start: 2021-10-22 | End: 2021-10-22 | Stop reason: HOSPADM

## 2021-10-22 RX ORDER — DEXAMETHASONE SODIUM PHOSPHATE 4 MG/ML
8 INJECTION, SOLUTION INTRA-ARTICULAR; INTRALESIONAL; INTRAMUSCULAR; INTRAVENOUS; SOFT TISSUE ONCE AS NEEDED
Status: COMPLETED | OUTPATIENT
Start: 2021-10-22 | End: 2021-10-22

## 2021-10-22 RX ORDER — SODIUM CHLORIDE, SODIUM LACTATE, POTASSIUM CHLORIDE, CALCIUM CHLORIDE 600; 310; 30; 20 MG/100ML; MG/100ML; MG/100ML; MG/100ML
9 INJECTION, SOLUTION INTRAVENOUS CONTINUOUS
Status: DISCONTINUED | OUTPATIENT
Start: 2021-10-22 | End: 2021-10-22 | Stop reason: HOSPADM

## 2021-10-22 RX ORDER — ONDANSETRON 4 MG/1
4 TABLET, FILM COATED ORAL EVERY 8 HOURS PRN
Qty: 20 TABLET | Refills: 0 | Status: SHIPPED | OUTPATIENT
Start: 2021-10-22 | End: 2021-11-18

## 2021-10-22 RX ADMIN — BUPIVACAINE HYDROCHLORIDE 20 ML: 2.5 INJECTION, SOLUTION EPIDURAL; INFILTRATION; INTRACAUDAL at 07:17

## 2021-10-22 RX ADMIN — BUPIVACAINE HYDROCHLORIDE 20 ML: 2.5 INJECTION, SOLUTION EPIDURAL; INFILTRATION; INTRACAUDAL; PERINEURAL at 07:13

## 2021-10-22 RX ADMIN — DEXMEDETOMIDINE 50 MCG: 100 INJECTION, SOLUTION, CONCENTRATE INTRAVENOUS at 07:10

## 2021-10-22 RX ADMIN — PROPOFOL 55 MCG/KG/MIN: 10 INJECTION, EMULSION INTRAVENOUS at 07:32

## 2021-10-22 RX ADMIN — SODIUM CHLORIDE, POTASSIUM CHLORIDE, SODIUM LACTATE AND CALCIUM CHLORIDE 9 ML/HR: 600; 310; 30; 20 INJECTION, SOLUTION INTRAVENOUS at 06:13

## 2021-10-22 RX ADMIN — GLYCOPYRROLATE 0.2 MG: 0.2 INJECTION INTRAMUSCULAR; INTRAVENOUS at 07:32

## 2021-10-22 RX ADMIN — DEXMEDETOMIDINE 50 MCG: 100 INJECTION, SOLUTION, CONCENTRATE INTRAVENOUS at 07:17

## 2021-10-22 RX ADMIN — HYDROCODONE BITARTRATE AND ACETAMINOPHEN 1 TABLET: 7.5; 325 TABLET ORAL at 10:44

## 2021-10-22 RX ADMIN — BUPIVACAINE HYDROCHLORIDE 1.6 ML: 7.5 INJECTION, SOLUTION EPIDURAL; RETROBULBAR at 07:01

## 2021-10-22 RX ADMIN — CEFAZOLIN SODIUM 2 G: 2 SOLUTION INTRAVENOUS at 07:37

## 2021-10-22 RX ADMIN — DEXAMETHASONE SODIUM PHOSPHATE 8 MG: 4 INJECTION, SOLUTION INTRAMUSCULAR; INTRAVENOUS at 06:53

## 2021-10-22 RX ADMIN — ONDANSETRON 4 MG: 2 INJECTION INTRAMUSCULAR; INTRAVENOUS at 06:53

## 2021-10-22 RX ADMIN — ACETAMINOPHEN 1000 MG: 500 TABLET, FILM COATED ORAL at 06:04

## 2021-10-22 RX ADMIN — BUPIVACAINE HYDROCHLORIDE 20 ML: 5 INJECTION, SOLUTION EPIDURAL; INTRACAUDAL; PERINEURAL at 07:10

## 2021-10-22 RX ADMIN — FAMOTIDINE 20 MG: 10 INJECTION INTRAVENOUS at 06:53

## 2021-10-22 RX ADMIN — KETAMINE HYDROCHLORIDE 25 MG: 10 INJECTION, SOLUTION INTRAMUSCULAR; INTRAVENOUS at 07:59

## 2021-10-22 RX ADMIN — PREGABALIN 150 MG: 75 CAPSULE ORAL at 06:04

## 2021-10-22 RX ADMIN — DEXMEDETOMIDINE 50 MCG: 100 INJECTION, SOLUTION, CONCENTRATE INTRAVENOUS at 07:13

## 2021-10-22 RX ADMIN — MIDAZOLAM HYDROCHLORIDE 1 MG: 1 INJECTION, SOLUTION INTRAMUSCULAR; INTRAVENOUS at 06:56

## 2021-10-22 RX ADMIN — MELOXICAM 15 MG: 7.5 TABLET ORAL at 06:04

## 2021-10-22 NOTE — ANESTHESIA PROCEDURE NOTES
Peripheral Block      Patient reassessed immediately prior to procedure    Patient location during procedure: pre-op  Start time: 10/22/2021 7:06 AM  Stop time: 10/22/2021 7:10 AM  Reason for block: at surgeon's request and post-op pain management  Performed by  CRNA: Gene Brito CRNA  Preanesthetic Checklist  Completed: patient identified, IV checked, site marked, risks and benefits discussed, surgical consent, monitors and equipment checked, pre-op evaluation and timeout performed  Prep:  Sterile barriers:cap, gloves, gown, mask and sterile barriers  Prep: ChloraPrep  Patient monitoring: blood pressure monitoring, continuous pulse oximetry and EKG  Procedure    Sedation: yes  Performed under: spinal  Guidance:ultrasound guided    ULTRASOUND INTERPRETATION. Using ultrasound guidance a 21 G gauge needle was placed in close proximity to the nerve, at which point, under ultrasound guidance anesthetic was injected in the area of the nerve and spread of the anesthesia was seen on ultrasound in close proximity thereto.  There were no abnormalities seen on ultrasound; a digital image was taken; and the patient tolerated the procedure with no complications. Images:still images obtained, printed/placed on chart    Laterality:left  Anesthesia block type: Genicular and Vastus medialis.  Injection Technique:single-shot  Needle Type:echogenic  Needle Gauge:21 G  Resistance on Injection: none    Medications Used: bupivacaine PF (MARCAINE) injection 0.5%, 20 mL  Med administered at 10/22/2021 7:10 AM      Medications  Comment:50 dexmedetomidine     Post Assessment  Injection Assessment: negative aspiration for heme, no paresthesia on injection and incremental injection  Patient Tolerance:comfortable throughout block  Complications:no

## 2021-10-22 NOTE — ANESTHESIA POSTPROCEDURE EVALUATION
Patient: Pedro Katz    Procedure Summary     Date: 10/22/21 Room / Location:  [facility] /  [facility]    Anesthesia Start: 0726 Anesthesia Stop: 0915    Procedures:       KNEE ARTHROSCOPY,ARTHROSCOPIC PARTIAL PATTELLAR CHONDROPLASTY. (Left Knee)      PATELLA TENDON REPAIR (Left Knee) Diagnosis:       Patellar tendon rupture, left, initial encounter      (Patellar tendon rupture, left, initial encounter [S86.913T])    Surgeons: Eric Lopez MD Provider: Gene Brito CRNA    Anesthesia Type: regional, spinal ASA Status: 2          Anesthesia Type: regional, spinal    Vitals  Vitals Value Taken Time   /84 10/22/21 0950   Temp 97.7 °F (36.5 °C) 10/22/21 0914   Pulse 53 10/22/21 0949   Resp 16 10/22/21 0945   SpO2 93 % 10/22/21 0950   Vitals shown include unvalidated device data.        Post Anesthesia Care and Evaluation    Patient location during evaluation: PHASE II  Patient participation: complete - patient participated  Level of consciousness: awake  Pain score: 0  Pain management: adequate  Airway patency: patent  Anesthetic complications: No anesthetic complications  PONV Status: none  Cardiovascular status: acceptable  Respiratory status: acceptable  Hydration status: acceptable  Post Neuraxial Block status: No signs or symptoms of PDPH

## 2021-10-22 NOTE — H&P
Orthopedic H&P    Patient ID: Pedro Katz is a 50 y.o. male.     Chief Complaint: Left knee pain, partial patella tendon disruption    History of Present Illness  Pedro Katz presents  for surgical treatment of left knee pain and partial patella tendon disruption following twisting his ankle on stairs carrying drywall in June or July 2020. He had multiple re-injuries following the initial as he increased his activity. He has undergone two PRP injections, bracing, and physical therapy with Armen Castro Jr., DO without relief. His worst problems are going down stairs and running. The pain is localized to the anterior without radiation. His pain is rated 5/10 in severity today and is aching in quality.  The pain is aggravated by stairs, deep flexion, and squatting but alleviated by rest. Denies tingling or numbness outside the anterior knee.    No current facility-administered medications on file prior to encounter.     No current outpatient medications on file prior to encounter.     No Known Allergies       Social History            Occupational History   • Not on file   Tobacco Use   • Smoking status: Never Smoker   • Smokeless tobacco: Never Used   Vaping Use   • Vaping Use: Never used   Substance and Sexual Activity   • Alcohol use: Yes       Comment: social    • Drug use: Defer   • Sexual activity: Defer      Medical History        Past Medical History:   Diagnosis Date   • Humerus fracture       as a child   • Seizure (CMS/[facility])       had a seizure in 1999   • Skin cancer           Surgical History         Past Surgical History:   Procedure Laterality Date   • FASCIOTOMY Left 2002            History reviewed. No pertinent family history.        Review of Systems           Objective:    Vitals:    10/22/21 0715 10/22/21 0718 10/22/21 0721 10/22/21 0724   BP: (!) 146/108 (!) 134/109 142/92 142/92   BP Location:       Patient Position:       Pulse: (!) 49 50 (!) 49 50   Resp: 16 16 18 16   Temp:        TempSrc:       SpO2: 97% 97% 95% 97%   Weight:              Body mass index is 27.02 kg/m².  Physical Exam     Vital signs reviewed.   General: No acute distress, alert and oriented  Eyes: conjunctiva clear; pupils equally round and reactive  ENT: external ears and nose atraumatic; oropharynx clear  CV: no peripheral edema  Resp: normal respiratory effort  Skin: no rashes or wounds; normal turgor  Psych: mood and affect appropriate; recent and remote memory intact              Objective     Ortho Exam      Left Knee-     ROM 0-130 degrees  4+/5 on flexion  4+/5 on extension  Maximal tenderness superior patellar tendon in mid-line  Paige- negative  Mild to moderate prominence noted along inferior pole of the patella asymmetric to right side     Effusion- minimal   Grade 1A Lachman  Anterior drawer- negative  Posterior drawer- negative   Stable opening on varus and valgus stress at 0 and 30  Active patellar compression test- positive - minimally      Positive sensation light tough all distributions symmetric to contralateral side  Brisk cap refill all digits  2+ dorsalis pedis pulse              Imaging:  MRI Knee Left without Contrast  IMPRESSION:  1. Left proximal patellar tendinitis with a partial-thickness  interstitial tear at the midline, dimensions as above. Most of the axial  area of the patellar tendon remains intact.  2. Incidentally noted ganglion or synovial cyst along the posteromedial  side of the knee is favored to be arising from the medial edge of the  bursa.  3. No other internal derangement is present.     This report was finalized on 12/17/2020 9:28 AM by Dr. Rao Powell M.D.     Left Knee X-Ray from outside facility dated November 2020  Indication: Pain     Views: AP, Lateral, and [place]     Findings:  No fracture  No bony lesion  Normal soft tissues  Normal joint spaces     No prior studies were available for comparison.  Signed Armen Castro Jr. DO on 11/25/2020     Review of  x-rays left knee as well as MRI left knee indicates partial-thickness interstitial tearing of left proximal patellar tendon in midline position, no evidence of meniscal, cruciate, collateral ligament injury.     Assessment:        Assessment       1. Patellar tendon rupture, left, initial encounter- PARTIAL             Plan:        Plan          1. Discussed treatment options at length with patient including arthroscopy with patellar tendon debridement with possible grafting.  Given failure of other conservative treatment options including but not limited to rest, bracing, anti-inflammatory medications, and multiple PRP injections, patient would like to proceed with surgical treatment.  2. Plan will be for left knee arthroscopy, patellar tendon debridement and repair, possible grafting, and all associated procedures.  I explained to patient details of the procedure as well as risks, benefits, and alternatives the procedure, with risks including but not limited to neurovascular damage, bleeding, infection, chronic pain, re-tear of the tendon repair, disease transmission, loss of motion, weakness, stiffness, swelling, DVT, pulmonary embolus, death, stroke, complex regional pain syndrome, myocardial infarction, need for additional procedures.  Patient understood all these and had all questions answered prior to verbally consenting to proceed with surgery.  No guarantees were given in regards to results of procedure.  3. Patient denies past medical history of blood clots, cardiac issues, or diabetes mellitus.         Pedro Katz was in agreement with plan and had all questions answered.          Dictated utilizing Dragon dictation

## 2021-10-22 NOTE — ANESTHESIA PROCEDURE NOTES
Spinal Block      Patient reassessed immediately prior to procedure    Start Time: 10/22/2021 7:00 AM  Stop Time: 10/22/2021 7:01 AM  Indication:at surgeon's request and post-op pain management  Performed By  CRNA: Gene Brito CRNA  Preanesthetic Checklist  Completed: patient identified, IV checked, site marked, risks and benefits discussed, surgical consent, monitors and equipment checked, pre-op evaluation and timeout performed  Spinal Block Prep:  Patient Position:sitting  Sterile Tech:cap, gloves, mask and sterile barriers  Prep:Chloraprep  Patient Monitoring:blood pressure monitoring, continuous pulse oximetry and EKG  Spinal Block Procedure  Approach:midline  Guidance:landmark technique and palpation technique  Location:L3-L4  Needle Type:Sprotte  Needle Gauge:25 G  Placement of Spinal needle event:cerebrospinal fluid aspirated  Paresthesia: no  Fluid Appearance:clear  Medications: bupivacaine PF (MARCAINE) injection 0.75%, 1.6 mL  Med Administered at 10/22/2021 7:01 AM   Post Assessment  Patient Tolerance:patient tolerated the procedure well with no apparent complications  Complications no

## 2021-10-22 NOTE — OP NOTE
DATE OF OPERATION: 10/22/2021    PREOPERATIVE DIAGNOSIS:   1. Left knee partial patella tendon rupture    POSTOPERATIVE DIAGNOSES:    1. Left knee partial patella tendon rupture  2.  Left knee patella chondromalacia     PROCEDURES PERFORMED:    1.  Left knee patella tendon repair with bio inductive implant  2. Left knee arthroscopy with patellar chondroplasty    SURGEON: Eric Lopez MD    ASSISTANT: Filippo Denise CSA-irrigation, retraction, closure, dressing application    ANESTHESIA: General endotracheal anesthesia with local    ESTIMATED BLOOD LOSS: minimal    URINE OUTPUT: Not recorded.     FLUIDS: Per Anesthesia.     COMPLICATIONS: None.     SPECIMENS: None.     DRAINS: None.     Tourniquet Times:   Less than 2 hours at 250 mmHg    EXAMINATION UNDER ANESTHESIA: Passive range of motion of the left knee 0° to 130°, mild effusion noted, grade 1A Lachman, negative anterior and posterior drawer, stable to varus and valgus stress 0° and 30°, midline patellar tracking, 1-quadrant medial and lateral patellar laxity.     ARTHROSCOPY FINDINGS:    1. Suprapatellar pouch- free of loose bodies.    2. Medial and lateral gutters- free of loose bodies.    3. Patellofemoral joint: Grade 2/3 chondral wear of central ridge of patella.    4. Medial compartment: No evidence of meniscal or articular cartilage pathology, meniscal root intact.    5. Notch: ACL intact. PCL intact.    6. Lateral compartment:  No evidence of meniscal or articular cartilage pathology.     IMPLANTS: [employer] 1.8 mm Q fix anchor x1, fibrin clot, medium [employer] bio inductive implant    INDICATIONS FOR PROCEDURE: The patient is a pleasant 50 y.o. male with significant history of pain in left knee after a fall over a year ago. Given persistence of pain, failure of conservative treatment, as well as MRI findings consistent with the above-mentioned diagnosis the patient elected to proceed with the above-mentioned procedure. Patient had also failed  conservative treatment. Patient was explained details of the procedure, as well as risks, benefits, and alternatives as documented on the history and physical, and had all questions answered prior to signing the operative consent form. No guarantees were given in regard to the results of the surgery.     DESCRIPTION OF PROCEDURE: The patient was seen, evaluated, and cleared for surgery by Anesthesia. Patient was met in the preoperative holding area. The operative site was marked, consent was reviewed, history and physical was updated, and preoperative labs were reviewed. The patient was then taken to the operating room and placed in a supine position on a regular OR table. After successful intubation per Anesthesia, an examination under anesthesia was carried out at this point in time. A nonsterile tourniquet was applied to the left lower extremity. The left leg was placed in a leg stearns, and the contralateral leg placed in stirrups. The patient was secured to table with a waist strap. All bony prominences were well padded. The left lower extremity was then sterilely prepped and draped in a standard fashion.     A formal timeout was completed, including confirmation of history and physical, operative consent, surgical site, patient identification number, and preoperative antibiotic administration. The left leg was then exsanguinated and the tourniquet inflated to 250 mmHg. The procedure was begun with establishing a standard anterolateral portal with a #11 blade followed by insertion of a blunt trocar and cannula. The scope was then inserted at this point in time. Anteromedial portal was then created with spinal needle using outside-in technique. A probe was then used to complete a diagnostic arthroscopic exam with findings as noted above.     Attention was then turned to patella chondroplasty with use of ablation wand on a setting to gently debride the frayed tissue and seal the remaining edges of the cartilage  defect area.  There is no evidence of any full-thickness regions of defect.  Region was less than 10 x 10 mm in size.    Under arthroscopic visualization, a spinal needle was then introduced at the site where the patella tendinopathy and partial rupture was noted and this was identified on the skin surface for later placement of incision for open repair.    Fluid was then evacuated from the knee at this point time attention was then turned to open patella tendon repair.  6 immunological incision made anterior aspect of the knee at this point time through skin and subcutaneous tissue with 15 blade.  Metzenbaum scissor was used to complete subcutaneous dissection identifying the patellar tendon.  The peritenon was opened in midline position with 15 blade and carefully preserved.  Patellar tendon defect site was identified based on MRI findings as well as marking site with a spinal needle from the arthroscopic visualization.  There is noted to be significant tendinopathy on the posterior aspect of the tendon as well as some detachment of the fibers from the proximal attachment site at the inferior pole of the patella.  This area was opened with a 15 blade longitudinal fashion and then debrided with a small rongeur and curette.  Once tissue was adequately debrided, a 1.8 mm Q fix anchor was inserted into the inferior pole of the patella x1.  This was then passed through the retracted proximal fibers and used to reattach the proximal end of the tendon at this time.    Attention was then turned to creation of fibrin clot with 15 cc of blood being passed sterilely from anesthesia onto the sterile field.  With gentle continuous motion with a rough edged osteotome, fibrin clot was formed and pressed with a Ray-Med.  Fibrin clot was then placed in the defect site and remaining of cytocide patella tendon repair was carried out this point time with interrupted 0 Vicryl in figure-of-eight fashion.  Sutures were cut short at this  point time.  Given the significance of the tendinopathy we proceeded with reinforcement with a [employer] bio inductive implant which was sutured onto the anterior surface of the defect site with 3-0 Monocryl in running fashion.  The peritenon was then also closed in running fashion with a 3-0 Monocryl followed by subcutaneous closure with 2-0 Vicryl skin closure with 3-0 strata fix as well as Prineo mesh and glue.     Wounds were then dressed with 4 x 4 gauze, Telfa, Tegaderm, ABD pad, Webril and Ace wrap.  Patient was placed in an ACL brace locked in extension.    At the end of the procedure all lap, needle, and sponge counts were correct x2. The patient had brisk capillary refill to all digits of the left lower extremity. Compartments were soft and easily compressible at the end of the procedure.     DISPOSITION: The patient was extubated per Anesthesia and taken to the recovery room in stable condition. Will be discharged home in the care of family. Follow up in 1 week for a wound check. Weight bear as tolerated to the left lower extremity with brace on at all times except for physical therapy. Discharge with [place] for pain control and Zofran for nausea and aspirin 81 mg daily for DVT prophylaxis. Discussed results of the procedure immediately postoperatively with the patient's family, and they had all questions answered at that time.     REHAB: We will place patient on patella tendon repair protocol, he is to stay locked in extension except for gentle range of motion from 0 to 30 degrees once he is evaluated with physical therapy.  We will advance him slightly faster than a standard repair with increasing his flexion by 30 degrees every week with a goal of 120 degrees of flexion by 5 to 6 weeks.

## 2021-10-22 NOTE — ANESTHESIA PREPROCEDURE EVALUATION
Anesthesia Evaluation     Patient summary reviewed and Nursing notes reviewed   no history of anesthetic complications:  NPO Solid Status: > 8 hours  NPO Liquid Status: > 8 hours           Airway   Mallampati: II  TM distance: >3 FB  Neck ROM: full  No difficulty expected  Dental - normal exam     Pulmonary - normal exam   (-) shortness of breath, sleep apnea  Cardiovascular - normal exam    (+) hyperlipidemia,   (-) angina      Neuro/Psych  (+) seizures (1998 - isolated), psychiatric history Depression,     GI/Hepatic/Renal/Endo      Musculoskeletal (-) negative ROS    Abdominal  - normal exam   Substance History   (+) alcohol use,      OB/GYN negative ob/gyn ROS         Other      history of cancer remission                    Anesthesia Plan    ASA 2     regional and spinal     intravenous induction     Anesthetic plan, all risks, benefits, and alternatives have been provided, discussed and informed consent has been obtained with: patient.  Use of blood products discussed with patient  Consented to blood products.

## 2021-10-22 NOTE — ADDENDUM NOTE
Addendum  created 10/22/21 1049 by Gene Brito CRNA    Clinical Note Signed, Diagnosis association updated, Intraprocedure Blocks edited, Intraprocedure Meds edited

## 2021-10-22 NOTE — ANESTHESIA PROCEDURE NOTES
Peripheral Block      Patient reassessed immediately prior to procedure    Patient location during procedure: pre-op  Start time: 10/22/2021 7:12 AM  Stop time: 10/22/2021 7:13 AM  Reason for block: at surgeon's request and post-op pain management  Performed by  CRNA: Gene Brito CRNA  Preanesthetic Checklist  Completed: patient identified, IV checked, site marked, risks and benefits discussed, surgical consent, monitors and equipment checked, pre-op evaluation and timeout performed  Prep:  Sterile barriers:cap, gloves, gown, mask and sterile barriers  Prep: ChloraPrep  Patient monitoring: blood pressure monitoring, continuous pulse oximetry and EKG  Procedure    Sedation: yes  Performed under: spinal  Guidance:ultrasound guided    ULTRASOUND INTERPRETATION. Using ultrasound guidance a 21 G gauge needle was placed in close proximity to the nerve, at which point, under ultrasound guidance anesthetic was injected in the area of the nerve and spread of the anesthesia was seen on ultrasound in close proximity thereto.  There were no abnormalities seen on ultrasound; a digital image was taken; and the patient tolerated the procedure with no complications. Images:still images obtained, printed/placed on chart    Laterality:left  Block Type:adductor canal block  Injection Technique:single-shot  Needle Type:echogenic  Needle Gauge:21 G      Medications Used: bupivacaine PF (MARCAINE) injection 0.25%, 20 mL  Med administered at 10/22/2021 7:13 AM      Medications  Comment:50 dexmedetomidine     Post Assessment  Injection Assessment: negative aspiration for heme, no paresthesia on injection and incremental injection  Patient Tolerance:comfortable throughout block  Complications:no

## 2021-10-22 NOTE — ANESTHESIA PROCEDURE NOTES
Peripheral Block      Patient reassessed immediately prior to procedure    Patient location during procedure: pre-op  Start time: 10/22/2021 7:15 AM  Stop time: 10/22/2021 7:17 AM  Reason for block: at surgeon's request and post-op pain management  Performed by  CRNA: Gene Brito CRNA  Preanesthetic Checklist  Completed: patient identified, IV checked, site marked, risks and benefits discussed, surgical consent, monitors and equipment checked, pre-op evaluation and timeout performed  Prep:  Sterile barriers:cap, gloves, gown, mask and sterile barriers  Prep: ChloraPrep  Patient monitoring: blood pressure monitoring, continuous pulse oximetry and EKG  Procedure    Sedation: yes  Performed under: spinal  Guidance:ultrasound guided    ULTRASOUND INTERPRETATION. Using ultrasound guidance a 21 G gauge needle was placed in close proximity to the nerve, at which point, under ultrasound guidance anesthetic was injected in the area of the nerve and spread of the anesthesia was seen on ultrasound in close proximity thereto.  There were no abnormalities seen on ultrasound; a digital image was taken; and the patient tolerated the procedure with no complications.   Laterality:left  Block Type:iPack  Injection Technique:single-shot  Needle Type:echogenic  Needle Gauge:21 G  Resistance on Injection: none    Medications Used: bupivacaine PF (MARCAINE) injection 0.25%, 20 mL  Med administered at 10/22/2021 7:17 AM      Medications  Comment:50 dexmedetomidine     Post Assessment  Injection Assessment: negative aspiration for heme, no paresthesia on injection and incremental injection  Patient Tolerance:comfortable throughout block  Complications:no

## 2021-10-25 ENCOUNTER — APPOINTMENT (OUTPATIENT)
Dept: PHYSICAL THERAPY | Facility: HOSPITAL | Age: 51
End: 2021-10-25

## 2021-10-25 ENCOUNTER — TREATMENT (OUTPATIENT)
Dept: PHYSICAL THERAPY | Facility: CLINIC | Age: 51
End: 2021-10-25

## 2021-10-25 DIAGNOSIS — R26.2 DIFFICULTY WALKING: ICD-10-CM

## 2021-10-25 DIAGNOSIS — S86.812D PATELLAR TENDON RUPTURE, LEFT, SUBSEQUENT ENCOUNTER: Primary | ICD-10-CM

## 2021-10-25 DIAGNOSIS — Z98.890 S/P LEFT KNEE ARTHROSCOPY: ICD-10-CM

## 2021-10-25 DIAGNOSIS — M25.562 ACUTE PAIN OF LEFT KNEE: ICD-10-CM

## 2021-10-25 DIAGNOSIS — R29.898 WEAKNESS OF LEFT LOWER EXTREMITY: ICD-10-CM

## 2021-10-25 PROCEDURE — 97110 THERAPEUTIC EXERCISES: CPT | Performed by: PHYSICAL THERAPIST

## 2021-10-25 PROCEDURE — 97161 PT EVAL LOW COMPLEX 20 MIN: CPT | Performed by: PHYSICAL THERAPIST

## 2021-10-25 NOTE — PROGRESS NOTES
"Physical Therapy Initial Evaluation and Plan of Care    Patient: Pedro Katz   : 1970  Diagnosis/ICD-10 Code:  Patellar tendon rupture, left, subsequent encounter [S86.812D]  Referring practitioner: Eric Lopez MD  Date of Initial Visit: 10/25/2021  Today's Date: 10/25/2021    Subjective Questionnaire: LEFS:     Subjective Evaluation    History of Present Illness  Date of surgery: 10/22/2021  Mechanism of injury: s/p left knee arthroscopy with patellar tendon repair and partial patellar chondroplasty 10/22/2021. Currently WBAT with crutches and brace locked at 0 degrees.    Quality of life: fair    Pain  Current pain ratin  At best pain ratin  At worst pain ratin  Location: left knee, occasional left hip  Quality: throbbing, dull ache and sharp  Relieving factors: ice and medications (ice 5-6x daily)  Aggravating factors: prolonged positioning and ambulation  Progression: worsening (worsening pain since surgery)    Social Support  Lives in: one-story house (\"a few\" steps)  Lives with: spouse    Diagnostic Tests  MRI studies: abnormal (prior to surgery)    Treatments  Previous treatment: physical therapy  Patient Goals  Patient goals for therapy: decreased pain, increased strength and return to sport/leisure activities             Objective          Observations   Left Knee   Positive for edema. Negative for deformity.     Additional Knee Observation Details  Bandage in place    Neurological Testing     Sensation     Knee   Left Knee   Intact: light touch    Active Range of Motion   Left Knee   Flexion: 30 (AAROM) degrees   Extension: 0 degrees     Right Knee   Normal active range of motion    Strength/Myotome Testing     Left Hip   Planes of Motion   Flexion: 4-  Extension: 4-  Abduction: 4-    Right Hip   Planes of Motion   Flexion: 5  Extension: 5  Abduction: 5    Right Knee   Normal strength    Additional Strength Details  Left knee MMT deferred secondary to post op " status      Tests     Additional Tests Details  Special tests deferred secondary to post op status    Ambulation   Weight-Bearing Status   Weight-Bearing Status (Left): weight-bearing as tolerated   Assistive device used: crutches    Additional Weight-Bearing Status Details  With brace    Ambulation: Level Surfaces   Ambulation with assistive device: independent          Assessment & Plan     Assessment  Impairments: abnormal gait, abnormal or restricted ROM, activity intolerance, impaired balance, impaired physical strength, lacks appropriate home exercise program and pain with function  Assessment details: Mr. Katz is a pleasant 50 year old male who presents with pain and limited ROM/strength/gait consistent with post op status. He will benefit from PT per protocol to restore normal functional mobility and allow pt to return to running and biking as desired.  Prognosis: good  Functional Limitations: walking, uncomfortable because of pain and standing  Goals  Plan Goals: SHORT TERM GOALS: 4-6 weeks Pt will:  1. Be independent and adherent with HEP and post op restrictions.  2. Demo left knee AAROM 0-100 degrees.  3. Perform SLR without extensor lag to prepare for ambulation without brace.     LONG TERM GOALS: 12 weeks Pt will:  1. Demo left knee AROM 0-135 degrees.  2. Demo LLE strength 5/5 to prepare for running, biking.  3. Demo SLS 20 seconds or better to prepare for ambulation on uneven terrain.  4. Demo ability to walk on treadmill at 3.0 MPH for 20 minutes without pain and with symmetrical gait pattern to prepare for running.  5. Demo squat with adequate glut activation and control of dynamic genu valgus.   6. LEFS 70/80 or better.    Plan  Therapy options: will be seen for skilled physical therapy services  Planned modality interventions: cryotherapy, TENS and thermotherapy (hydrocollator packs)  Planned therapy interventions: balance/weight-bearing training, flexibility, functional ROM exercises, gait  training, home exercise program, manual therapy, neuromuscular re-education, soft tissue mobilization, strengthening, stretching and therapeutic activities  Frequency: 3x week  Duration in weeks: 12  Treatment plan discussed with: patient and family        Manual Therapy:    0     mins  20671;  Therapeutic Exercise:    15     mins  48239;     Neuromuscular Maribel:    0    mins  65383;    Therapeutic Activity:     0     mins  27745;     Gait Trainin     mins  86114;     Ultrasound:     0     mins  43809;    Electrical Stimulation:    0     mins  15997 ( );    Timed Treatment:   15   mins   Total Treatment:     45   mins    PT SIGNATURE: Dara Appiah PT, DPT          Physical Therapist                               [place] License #[id number]    DATE TREATMENT INITIATED: 10/25/2021    Initial Certification  Certification Period: 2022  I certify that the therapy services are furnished while this patient is under my care.  The services outlined above are required by this patient, and will be reviewed every 90 days.     PHYSICIAN: Eric Lopez MD      DATE:     Please sign and return via fax to [phone].. Thank you, [facility] Physical Therapy.

## 2021-10-25 NOTE — PATIENT INSTRUCTIONS
Pt was educated regarding relevant anatomy/physiology and plan of care.      Access Code: [email or web address]  URL: [email or web address]  Date: 10/25/2021  Prepared by: Dara Appiah    Exercises  Long Sitting Quad Set - 3 x daily - 10 reps - 5 hold - 2 sets  Supine Heel Slide with Strap - 3 x daily - 1 sets - 10 reps - 5 hold  Long Sitting Calf Stretch with Strap - 3 x daily - 3 reps - 20 hold  Supine Knee Extension Stretch on Towel Roll - 3 x daily - 1 sets - 1 reps - 2 hold  Supine Single Leg Ankle Pumps - 3 x daily - 2 sets - 10 reps

## 2021-10-26 DIAGNOSIS — S86.812A PATELLAR TENDON RUPTURE, LEFT, INITIAL ENCOUNTER: ICD-10-CM

## 2021-10-26 RX ORDER — HYDROCODONE BITARTRATE AND ACETAMINOPHEN 7.5; 325 MG/1; MG/1
1-2 TABLET ORAL EVERY 4 HOURS PRN
Qty: 40 TABLET | Refills: 0 | Status: SHIPPED | OUTPATIENT
Start: 2021-10-26 | End: 2021-11-05 | Stop reason: SDUPTHER

## 2021-10-26 NOTE — TELEPHONE ENCOUNTER
Patient calling for an RX refill of Hydrocodone/acetaminophen (NORCO) 7.5/325 per tablet- 1-2 tablet by mouth every 4 hours PRN. #40 Last filled 10.22.2021.         He is status post left knee scope/Left knee patella tendon repair with bio inductive implant/Left knee arthroscopy with patellar chondroplasty on 10.22.2021

## 2021-10-28 ENCOUNTER — OFFICE VISIT (OUTPATIENT)
Dept: ORTHOPEDIC SURGERY | Facility: CLINIC | Age: 51
End: 2021-10-28

## 2021-10-28 VITALS — WEIGHT: 193 LBS | HEIGHT: 69 IN | BODY MASS INDEX: 28.58 KG/M2

## 2021-10-28 DIAGNOSIS — Z98.890 S/P LEFT KNEE ARTHROSCOPY: Primary | ICD-10-CM

## 2021-10-28 PROCEDURE — 99024 POSTOP FOLLOW-UP VISIT: CPT | Performed by: ORTHOPAEDIC SURGERY

## 2021-10-28 NOTE — PROGRESS NOTES
CC: Follow-up status post left knee arthroscopy with Patella chondroplasty and partial patella tendon repair, DOS 10/22/2021    Interval history: Patient returns to clinic today noting mild pain. No fevers, chills or sweats. No drainage from dressing noted. Weight-bearing as tolerated on left lower extremity and using brace. Denies numbness or tingling to left leg.      Exam: Left knee-incision clean dry and intact. Knee range of motion 0-35°. mild effusion. Positive sensation light touch all distributions left foot symmetric to the contralateral side, brisk cap refill all digits, 2+ dorsalis pedis pulse left foot     Impression: Status post left knee arthroscopy with patella tendon repair     Plan:  1.  We will progress with physical therapy following patella tendon repair protocol, advancing flexion by 30 degrees each week until reaching goal of 120 x 5 to 6 weeks after surgery.  2.  Weightbearing as tolerated with brace locked in extension when upright and ambulating.  We can start to advance his motion allowable with his brace as he is able to tolerate with physical therapy.  3.  Follow-up in 3 weeks, my goal for him at that time would be to get to 90 degrees of flexion and be able to do a straight leg raise without extensor lag.  If he is able to do so then we will unlock his brace for ambulation.  4.  All questions answered

## 2021-11-03 ENCOUNTER — TREATMENT (OUTPATIENT)
Dept: PHYSICAL THERAPY | Facility: CLINIC | Age: 51
End: 2021-11-03

## 2021-11-03 DIAGNOSIS — S86.812D PATELLAR TENDON RUPTURE, LEFT, SUBSEQUENT ENCOUNTER: Primary | ICD-10-CM

## 2021-11-03 DIAGNOSIS — Z98.890 S/P LEFT KNEE ARTHROSCOPY: ICD-10-CM

## 2021-11-03 DIAGNOSIS — M25.562 ACUTE PAIN OF LEFT KNEE: ICD-10-CM

## 2021-11-03 DIAGNOSIS — R29.898 WEAKNESS OF LEFT LOWER EXTREMITY: ICD-10-CM

## 2021-11-03 DIAGNOSIS — R26.2 DIFFICULTY WALKING: ICD-10-CM

## 2021-11-03 PROCEDURE — 97110 THERAPEUTIC EXERCISES: CPT | Performed by: PHYSICAL THERAPIST

## 2021-11-03 NOTE — PROGRESS NOTES
Physical Therapy Daily Progress Note    Visit #2    Subjective     Pedro Katz reports: knee is feeling pretty good. Adherent with use of brace, has weaned from crutches.       Objective   See Exercise, Manual, and Modality Logs for complete treatment.       Assessment/Plan  Per MD note on 10/28, pt is to advance flexion 30 degrees each week with the goal of 120 degrees 5-6 weeks post op. Advanced to 60 degrees today with heel slides, which was tolerated well. Updated HEP in gabino, will continue to advance exercises per protocol.   Progress per Plan of Care           Manual Therapy:    5     mins  59176;  Therapeutic Exercise:    23     mins  02669;     Neuromuscular Maribel:    0    mins  06058;    Therapeutic Activity:     0     mins  96324;     Gait Trainin     mins  65097;     Ultrasound:     0     mins  29352;    Electrical Stimulation:    0     mins  28062 ( );    Timed Treatment:   28   mins   Total Treatment:     28   mins    Dara Appiah PT, DPT  Physical Therapist  [place] License #[id number]

## 2021-11-05 DIAGNOSIS — S86.812A PATELLAR TENDON RUPTURE, LEFT, INITIAL ENCOUNTER: ICD-10-CM

## 2021-11-05 RX ORDER — HYDROCODONE BITARTRATE AND ACETAMINOPHEN 7.5; 325 MG/1; MG/1
1-2 TABLET ORAL EVERY 4 HOURS PRN
Qty: 40 TABLET | Refills: 0 | Status: SHIPPED | OUTPATIENT
Start: 2021-11-05 | End: 2021-12-01 | Stop reason: SDUPTHER

## 2021-11-05 NOTE — TELEPHONE ENCOUNTER
Patient calling for an RX refill of Hydrocodone/acetaminophen (NORCO) 7.5/325 per tablet- 1-2 tablet by mouth every 4 hours PRN. #40 Last filled 10.26.2021.      Status post left knee scope/Left knee patella tendon repair with bio inductive implant/Left knee arthroscopy with patellar chondroplasty on 10.22.2021     Previous RX pended for your approval, change or denial.    Thanks.

## 2021-11-09 ENCOUNTER — TREATMENT (OUTPATIENT)
Dept: PHYSICAL THERAPY | Facility: CLINIC | Age: 51
End: 2021-11-09

## 2021-11-09 DIAGNOSIS — R29.898 WEAKNESS OF LEFT LOWER EXTREMITY: ICD-10-CM

## 2021-11-09 DIAGNOSIS — S86.812D PATELLAR TENDON RUPTURE, LEFT, SUBSEQUENT ENCOUNTER: Primary | ICD-10-CM

## 2021-11-09 DIAGNOSIS — R26.2 DIFFICULTY WALKING: ICD-10-CM

## 2021-11-09 DIAGNOSIS — Z98.890 S/P LEFT KNEE ARTHROSCOPY: ICD-10-CM

## 2021-11-09 DIAGNOSIS — M25.562 ACUTE PAIN OF LEFT KNEE: ICD-10-CM

## 2021-11-09 PROCEDURE — 97110 THERAPEUTIC EXERCISES: CPT | Performed by: PHYSICAL THERAPIST

## 2021-11-09 PROCEDURE — 97530 THERAPEUTIC ACTIVITIES: CPT | Performed by: PHYSICAL THERAPIST

## 2021-11-09 NOTE — PROGRESS NOTES
Physical Therapy Daily Progress Note    Patient: Pedro Katz   : 1970  Diagnosis/ICD-10 Code:  Patellar tendon rupture, left, subsequent encounter [S86.812D]  Referring practitioner: Eric Lopez MD  Date of Initial Visit: Type: THERAPY  Noted: 10/25/2021  Today's Date: 2021  Patient seen for 3 sessions         Pedro Katz reports: having some pain. I feel as though I have progressed enough to where I do not need the brace. I don't wear it unless I am going out for a long distance or if its raining. It is very uncomfortable. Can you unlock it?     Subjective     Objective   95 degrees L knee flexion  See Exercise, Manual, and Modality Logs for complete treatment.       Assessment/Plan  Strongly recommended to patient that he wear the brace all the time when walking as according to his surgeons protocol and reducing his risk for re-injury. Patient came into clinic without brace on, he did leave the clinic with the brace on and in the fully locked position. Educated him about extensor lag and to be sure to avoid that while performing SLR exercises at home; told him to don the brace if he notices that he is fatiguing quickly with HEP. Performed well with added SLR exercises without quad lag. Demonstrates ability to fatigue associated musculature. Continues to benefit from verbal/tactile cues for proper exercise form and technique.    Progress per Plan of Care           Manual Therapy:         mins  03667;  Therapeutic Exercise:    23     mins  72134;     Neuromuscular Maribel:        mins  99757;    Therapeutic Activity:     10     mins  70365;     Gait Training:           mins  23646;     Ultrasound:          mins  32802;    Electrical Stimulation:         mins  56751 ( );  Dry Needling          mins self-pay    Timed Treatment:   33   mins   Total Treatment:     33   mins    Flores Dominguez PTA  Physical Therapist Assistant [phone]

## 2021-11-11 ENCOUNTER — TREATMENT (OUTPATIENT)
Dept: PHYSICAL THERAPY | Facility: CLINIC | Age: 51
End: 2021-11-11

## 2021-11-11 DIAGNOSIS — S86.812D PATELLAR TENDON RUPTURE, LEFT, SUBSEQUENT ENCOUNTER: Primary | ICD-10-CM

## 2021-11-11 DIAGNOSIS — R29.898 WEAKNESS OF LEFT LOWER EXTREMITY: ICD-10-CM

## 2021-11-11 DIAGNOSIS — R26.2 DIFFICULTY WALKING: ICD-10-CM

## 2021-11-11 DIAGNOSIS — Z98.890 S/P LEFT KNEE ARTHROSCOPY: ICD-10-CM

## 2021-11-11 DIAGNOSIS — M25.562 ACUTE PAIN OF LEFT KNEE: ICD-10-CM

## 2021-11-11 PROCEDURE — 97112 NEUROMUSCULAR REEDUCATION: CPT | Performed by: PHYSICAL THERAPIST

## 2021-11-11 PROCEDURE — 97110 THERAPEUTIC EXERCISES: CPT | Performed by: PHYSICAL THERAPIST

## 2021-11-11 NOTE — PROGRESS NOTES
Physical Therapy Daily Progress Note    Patient: Pedro Katz   : 1970  Diagnosis/ICD-10 Code:  Patellar tendon rupture, left, subsequent encounter [S86.812D]  Referring practitioner: Eric Lopez MD  Date of Initial Visit: Type: THERAPY  Noted: 10/25/2021  Today's Date: 2021  Patient seen for 4 sessions         Pedro Katz reports: good muscle soreness after last session.     Subjective     Objective   See Exercise, Manual, and Modality Logs for complete treatment.       Assessment/Plan  Subjectively, pt reports no increase of pain or discomfort with interventions performed today. Pt continues to not wear his brace as recommended into and outside of the clinic. Performed well with increased repetitions of SLR activities. Pt does wear the brace out of the clinic with continued instructions to wear it.  Continues to demonstrate non-compliance with MD protocol limitations outside of clinic. Able to slightly increase repetitions of SLR exercise without quad lag. Continues to benefit from verbal/tactile cues to ensure proper form and technique for exercise performance.     Progress per Plan of Care           Manual Therapy:         mins  97705;  Therapeutic Exercise:    20     mins  29612;     Neuromuscular Maribel:  10      mins  85451;    Therapeutic Activity:          mins  01506;     Gait Training:           mins  98165;     Ultrasound:          mins  92321;    Electrical Stimulation:         mins  65041 ( );  Dry Needling          mins self-pay    Timed Treatment:   30   mins   Total Treatment:     30   mins    Flores Dominguez PTA  Physical Therapist Assistant [phone]

## 2021-11-15 ENCOUNTER — TREATMENT (OUTPATIENT)
Dept: PHYSICAL THERAPY | Facility: CLINIC | Age: 51
End: 2021-11-15

## 2021-11-15 DIAGNOSIS — M25.562 ACUTE PAIN OF LEFT KNEE: ICD-10-CM

## 2021-11-15 DIAGNOSIS — Z98.890 S/P LEFT KNEE ARTHROSCOPY: ICD-10-CM

## 2021-11-15 DIAGNOSIS — S86.812D PATELLAR TENDON RUPTURE, LEFT, SUBSEQUENT ENCOUNTER: Primary | ICD-10-CM

## 2021-11-15 DIAGNOSIS — R26.2 DIFFICULTY WALKING: ICD-10-CM

## 2021-11-15 DIAGNOSIS — R29.898 WEAKNESS OF LEFT LOWER EXTREMITY: ICD-10-CM

## 2021-11-15 PROCEDURE — 97032 APPL MODALITY 1+ESTIM EA 15: CPT | Performed by: PHYSICAL THERAPIST

## 2021-11-15 PROCEDURE — 97530 THERAPEUTIC ACTIVITIES: CPT | Performed by: PHYSICAL THERAPIST

## 2021-11-15 PROCEDURE — 97110 THERAPEUTIC EXERCISES: CPT | Performed by: PHYSICAL THERAPIST

## 2021-11-15 NOTE — PROGRESS NOTES
Physical Therapy Daily Progress Note    Patient: Pedro Katz   : 1970  Diagnosis/ICD-10 Code:  Patellar tendon rupture, left, subsequent encounter [S86.812D]  Referring practitioner: Eric Lopez MD  Date of Initial Visit: Type: THERAPY  Noted: 10/25/2021  Today's Date: 11/15/2021  Patient seen for 5 sessions         Pedro Katz reports: more sore today, been trying to wear the brace more even though it is a hassle. I don't think I have injured myself further, not much increased swelling or redness.    Subjective     Objective   See Exercise, Manual, and Modality Logs for complete treatment.       Assessment/Plan  Pt has not been complaint with protocol limitations in regards to wearing the brace. He did wear it into the clinic today because he was more sore than usual. Performed well with SLR activities without quad lag.  Demonstrated decreased ROM today. No significant tenderness to palpation in and around the knee and patellar tendon. Decreased repetitions of exercises due to increase of pain. Electrical stimulation added for pain relief. Discussed that patient was doing too much too soon which caused increase in his pain, recommended again to pt to wear the brace at all times in weightbearing as prescribed by MD protocol. Continues to benefit from verbal/tactile cues to ensure proper form and technique for exercise performance.     Progress per Plan of Care. MD on , note sent.           Manual Therapy:         mins  10322;  Therapeutic Exercise:    20     mins  90332;     Neuromuscular Maribel:        mins  18073;    Therapeutic Activity:     10     mins  54888;     Gait Training:           mins  97766;     Ultrasound:          mins  53164;    Electrical Stimulation:    15     mins  60288 ( );  Dry Needling          mins self-pay    Timed Treatment:   30   mins   Total Treatment:     45   mins    Flores Dominguez PTA  Physical Therapist Assistant [phone]

## 2021-11-18 ENCOUNTER — OFFICE VISIT (OUTPATIENT)
Dept: ORTHOPEDIC SURGERY | Facility: CLINIC | Age: 51
End: 2021-11-18

## 2021-11-18 VITALS — BODY MASS INDEX: 28.58 KG/M2 | WEIGHT: 193 LBS | HEIGHT: 69 IN

## 2021-11-18 DIAGNOSIS — Z98.890 S/P LEFT KNEE ARTHROSCOPY: Primary | ICD-10-CM

## 2021-11-18 PROCEDURE — 99024 POSTOP FOLLOW-UP VISIT: CPT | Performed by: ORTHOPAEDIC SURGERY

## 2021-11-18 RX ORDER — HYDROCODONE BITARTRATE AND ACETAMINOPHEN 5; 325 MG/1; MG/1
1 TABLET ORAL EVERY 4 HOURS PRN
Qty: 60 TABLET | Refills: 0 | Status: SHIPPED | OUTPATIENT
Start: 2021-11-18 | End: 2022-12-14

## 2021-11-18 NOTE — PROGRESS NOTES
CC: Follow-up status post left knee arthroscopy with Patella chondroplasty and partial patella tendon repair, DOS 10/22/2021     Interval history: Patient returns to the clinic today for follow-up status post left knee arthroscopy with Patella chondroplasty and partial patella tendon repair. He confirms significant improvement in motion. The patient reports successfully progressing motion past 90 degrees during the week of 11/08/2021 but notes a decrease in progression this week. He experiences moderate to severe pain with increased activity and utilizes hydrocodone as needed for pain.     Exam: Left knee-incision well-healed.    ROM 0 to 90 degrees   4+/5 on flexion  4+/5 on extension  Maximal tenderness   Straight leg raise- No extensor lag  Effusion-Minimal         Impression: Status post left knee arthroscopy with patella tendon repair     Plan:  1. Discussed treatment options at length with patient at today's visit.  2.  We will supply the patient with a Drytex brace with 90 degree lock  3.  He will continue to focus on range of motion working with physical therapy. If he is not to 120 degrees by 6 weeks, I have asked him and the therapist to reach out to me so that I may prescribe him a prednisone taper to help with mobility.   4. I have also refilled his prescription for Norco  5.  I will follow up with the patient in 4 weeks to reassess improvement of motion.    Transcribed from ambient dictation for Eric Lopez MD by Tasneem Restrepo.  11/18/21   16:32 EST    Patient verbalized consent to the visit recording.  I have personally performed the services described in this document as transcribed by the above individual, and it is both accurate and complete.  Eric Lpoez MD  11/28/2021  23:23 EST

## 2021-11-19 ENCOUNTER — TREATMENT (OUTPATIENT)
Dept: PHYSICAL THERAPY | Facility: CLINIC | Age: 51
End: 2021-11-19

## 2021-11-19 DIAGNOSIS — R29.898 WEAKNESS OF LEFT LOWER EXTREMITY: ICD-10-CM

## 2021-11-19 DIAGNOSIS — R26.2 DIFFICULTY WALKING: ICD-10-CM

## 2021-11-19 DIAGNOSIS — S86.812D PATELLAR TENDON RUPTURE, LEFT, SUBSEQUENT ENCOUNTER: ICD-10-CM

## 2021-11-19 DIAGNOSIS — Z98.890 S/P LEFT KNEE ARTHROSCOPY: Primary | ICD-10-CM

## 2021-11-19 DIAGNOSIS — M25.562 ACUTE PAIN OF LEFT KNEE: ICD-10-CM

## 2021-11-19 PROCEDURE — 97530 THERAPEUTIC ACTIVITIES: CPT | Performed by: PHYSICAL THERAPIST

## 2021-11-19 PROCEDURE — 97140 MANUAL THERAPY 1/> REGIONS: CPT | Performed by: PHYSICAL THERAPIST

## 2021-11-19 PROCEDURE — 97110 THERAPEUTIC EXERCISES: CPT | Performed by: PHYSICAL THERAPIST

## 2021-11-19 NOTE — PROGRESS NOTES
Physical Therapy Daily Treatment Note      Patient: Pedro Katz   : 1970  Referring practitioner: Eric Lopez MD  Date of Initial Visit: Type: THERAPY  Noted: 10/25/2021  Today's Date: 2021  Patient seen for 6 sessions         Pedro Katz reports: good report at MD yesterday.  States that MD changed brace and that he wanted ROM to improve by 6 week miguel a.        Subjective     Objective   -presents to clinic wearing L short legged knee brace. Able to don/doff independently.    -ambulates with noted antalgia L LE with brace wear - noted decreased active hip and knee flexion.  -tender infrapatella L LE with palpation  -evident weakness L vs R quad contraction  -103 deg left knee flex post manual and stretching    See Exercise, Manual, and Modality Logs for complete treatment.   -review of MD protocol advancement by dates.  -reviewed HEP for proper exercise performance and technique and positioning.    Added:  NuStep x 8 min Lv 3 UE assisted knee flexion      Assessment/Plan  Positive response to manual intervention this session in regards to improved left knee ROM post soft tissue mobilization and passive stretching.  Evident left vs right quad weakness and benefits from verbal/tactile cues to ensure proper posture, exercise technique and performance with SLR to ensure maximum contraction, hold and control with activity. Should improve with continued PT intervention.          Progress per Plan of Care / per protocol.  Consider addition of Russian estim to left quad with SLR.           Timed:  Manual Therapy:   18      mins  67436;  Therapeutic Exercise:   14      mins  58044;     Neuromuscular Maribel:        mins  17787;    Therapeutic Activity:    12      mins  30658;     Gait Training:           mins  03237;     Ultrasound:          mins  98127;      Untimed:  Electrical Stimulation:         mins  97288 ( );  Mechanical Traction:         mins  06110;     Timed Treatment: 44     mins    Total Treatment:   44     mins  Gonzalo Cash, [facility]  Physical Therapist  Assistant  [id number]

## 2021-11-22 ENCOUNTER — TREATMENT (OUTPATIENT)
Dept: PHYSICAL THERAPY | Facility: CLINIC | Age: 51
End: 2021-11-22

## 2021-11-22 DIAGNOSIS — S86.812D PATELLAR TENDON RUPTURE, LEFT, SUBSEQUENT ENCOUNTER: ICD-10-CM

## 2021-11-22 DIAGNOSIS — R29.898 WEAKNESS OF LEFT LOWER EXTREMITY: ICD-10-CM

## 2021-11-22 DIAGNOSIS — R26.2 DIFFICULTY WALKING: ICD-10-CM

## 2021-11-22 DIAGNOSIS — Z98.890 S/P LEFT KNEE ARTHROSCOPY: Primary | ICD-10-CM

## 2021-11-22 DIAGNOSIS — M25.562 ACUTE PAIN OF LEFT KNEE: ICD-10-CM

## 2021-11-22 PROCEDURE — 97110 THERAPEUTIC EXERCISES: CPT | Performed by: PHYSICAL THERAPIST

## 2021-11-22 PROCEDURE — 97140 MANUAL THERAPY 1/> REGIONS: CPT | Performed by: PHYSICAL THERAPIST

## 2021-11-22 NOTE — PROGRESS NOTES
Physical Therapy Daily Progress Note    Visit #7    Subjective     Pedro [place] reports: continued adherence with HEP. Quad stays tight      Objective          Active Range of Motion   Left Knee   Flexion: 100 (AAROM) degrees       See Exercise, Manual, and Modality Logs for complete treatment.       Assessment/Plan  Able to flex to 100 today. Decreased pain and improved quad flexibility after manual therapy.   Progress per Plan of Care           Manual Therapy:    18     mins  22430;  Therapeutic Exercise:    32     mins  51060;     Neuromuscular Maribel:    0    mins  70972;    Therapeutic Activity:     0     mins  32528;     Gait Trainin     mins  55957;     Ultrasound:     0     mins  99118;    Electrical Stimulation:    0     mins  16419 ( );    Timed Treatment:   50   mins   Total Treatment:     50   mins    Dara Appiah PT, DPT  Physical Therapist  [place] License #[id number]

## 2021-11-24 ENCOUNTER — TREATMENT (OUTPATIENT)
Dept: PHYSICAL THERAPY | Facility: CLINIC | Age: 51
End: 2021-11-24

## 2021-11-24 DIAGNOSIS — S86.812D PATELLAR TENDON RUPTURE, LEFT, SUBSEQUENT ENCOUNTER: ICD-10-CM

## 2021-11-24 DIAGNOSIS — M25.562 ACUTE PAIN OF LEFT KNEE: ICD-10-CM

## 2021-11-24 DIAGNOSIS — Z98.890 S/P LEFT KNEE ARTHROSCOPY: Primary | ICD-10-CM

## 2021-11-24 DIAGNOSIS — R29.898 WEAKNESS OF LEFT LOWER EXTREMITY: ICD-10-CM

## 2021-11-24 DIAGNOSIS — R26.2 DIFFICULTY WALKING: ICD-10-CM

## 2021-11-24 PROCEDURE — 97140 MANUAL THERAPY 1/> REGIONS: CPT | Performed by: PHYSICAL THERAPIST

## 2021-11-24 PROCEDURE — 97110 THERAPEUTIC EXERCISES: CPT | Performed by: PHYSICAL THERAPIST

## 2021-11-24 NOTE — PROGRESS NOTES
Re-Assessment / Re-Certification    Patient: Pedro Katz   : 1970  Diagnosis/ICD-10 Code:  S/P left knee arthroscopy [Z98.890]  Referring practitioner: Eric Lopez MD  Date of Initial Visit: 2021  Today's Date: 2021  Patient seen for 8 sessions      Subjective:   Pedro Katz reports: his knee is more sore today, especially on the inside and below his knee. Reports consistent performance of HEP including knee   Subjective Questionnaire: LEFS: 42/80 (improved from  at initial evaluation)  Clinical Progress: improved  Home Program Compliance: Yes  Treatment has included: therapeutic exercise, neuromuscular re-education, manual therapy, therapeutic activity and cryotherapy    Subjective   Objective          Active Range of Motion   Left Knee   Flexion: 99 (AAROM) degrees   Extension: 0 degrees       Assessment/Plan  Progress toward previous goals: Partially Met    SHORT TERM GOALS: 4-6 weeks Pt will:  1. Be independent and adherent with HEP and post op restrictions. (MET)  2. Demo left knee AAROM 0-100 degrees. (MET)  3. Perform SLR without extensor lag to prepare for ambulation without brace. (MET)    LONG TERM GOALS: 12 weeks Pt will:  1. Demo left knee AROM 0-135 degrees.(PROGRESSING)  2. Demo LLE strength 5/5 to prepare for running, biking. (PROGRESSING)  3. Demo SLS 20 seconds or better to prepare for ambulation on uneven terrain. (NOT MET)  4. Demo ability to walk on treadmill at 3.0 MPH for 20 minutes without pain and with symmetrical gait pattern to prepare for running. (NOT MET)  5. Demo squat with adequate glut activation and control of dynamic genu valgus. (NOT MET)  6. LEFS 70/80 or better. (PROGRESSING)      Recommendations: Continue as planned per protocol  Timeframe: 1 month  Prognosis to achieve goals: good    PT Signature: Dara Appiah, PT, DPT                         Physical Therapist                         [place] License #[id number]    Manual Therapy:    12     mins   54959;  Therapeutic Exercise:    34     mins  84160;     Neuromuscular Maribel:    0    mins  42329;    Therapeutic Activity:     0     mins  08609;     Gait Trainin     mins  34320;     Ultrasound:     0     mins  61161;    Electrical Stimulation:    0     mins  83800 ( );    Timed Treatment:   46   mins   Total Treatment:     46   mins

## 2021-12-01 ENCOUNTER — TELEPHONE (OUTPATIENT)
Dept: ORTHOPEDIC SURGERY | Facility: CLINIC | Age: 51
End: 2021-12-01

## 2021-12-01 ENCOUNTER — TREATMENT (OUTPATIENT)
Dept: PHYSICAL THERAPY | Facility: CLINIC | Age: 51
End: 2021-12-01

## 2021-12-01 DIAGNOSIS — M25.562 ACUTE PAIN OF LEFT KNEE: ICD-10-CM

## 2021-12-01 DIAGNOSIS — Z98.890 S/P LEFT KNEE ARTHROSCOPY: Primary | ICD-10-CM

## 2021-12-01 DIAGNOSIS — S86.812D PATELLAR TENDON RUPTURE, LEFT, SUBSEQUENT ENCOUNTER: ICD-10-CM

## 2021-12-01 DIAGNOSIS — R29.898 WEAKNESS OF LEFT LOWER EXTREMITY: ICD-10-CM

## 2021-12-01 DIAGNOSIS — R26.2 DIFFICULTY WALKING: ICD-10-CM

## 2021-12-01 PROCEDURE — 97110 THERAPEUTIC EXERCISES: CPT | Performed by: PHYSICAL THERAPIST

## 2021-12-01 PROCEDURE — 97140 MANUAL THERAPY 1/> REGIONS: CPT | Performed by: PHYSICAL THERAPIST

## 2021-12-01 RX ORDER — PREDNISONE 10 MG/1
TABLET ORAL
Qty: 39 TABLET | Refills: 0 | Status: SHIPPED | OUTPATIENT
Start: 2021-12-01 | End: 2021-12-23

## 2021-12-01 NOTE — TELEPHONE ENCOUNTER
Patient calling- he states that per his PT he is not progressing so they are requesting the prednisone.     Your last plan of care dated 11.18.2021 is below:    Plan: He will continue to focus on range of motion working with physical therapy. If he is not to 120 degrees by 6 weeks, I have asked him and the therapist to reach out to me so that I may prescribe him a prednisone taper to help with mobility.    Status post left knee arthroscopy with Patella chondroplasty and partial patella tendon repair, DOS 10/22/2021

## 2021-12-01 NOTE — PROGRESS NOTES
" Physical Therapy Daily Progress Note    Visit #9    Subjective     Pedro Katz reports: continued adherence with HEP, but states \"it just won't bend\".       Objective          Active Range of Motion   Left Knee   Flexion: 103 degrees   Extension: 0 degrees       See Exercise, Manual, and Modality Logs for complete treatment.       Assessment/Plan  Pt is 6 weeks post op on Friday. Per MD instruction, advised pt to contact MD office for prednisone taper due to limited progress with ROM. He verbalizes understanding and will call office.  Progress per Plan of Care           Manual Therapy:    12     mins  68783;  Therapeutic Exercise:    35     mins  67853;     Neuromuscular Maribel:    0    mins  96662;    Therapeutic Activity:     0     mins  79035;     Gait Trainin     mins  98827;     Ultrasound:     0     mins  68223;    Electrical Stimulation:    0     mins  78242 ( );    Timed Treatment:   47   mins   Total Treatment:     47   mins    Dara Appiah PT, DPT  Physical Therapist  [place] License #[id number]                    "

## 2021-12-03 ENCOUNTER — TREATMENT (OUTPATIENT)
Dept: PHYSICAL THERAPY | Facility: CLINIC | Age: 51
End: 2021-12-03

## 2021-12-03 DIAGNOSIS — R29.898 WEAKNESS OF LEFT LOWER EXTREMITY: ICD-10-CM

## 2021-12-03 DIAGNOSIS — S86.812D PATELLAR TENDON RUPTURE, LEFT, SUBSEQUENT ENCOUNTER: ICD-10-CM

## 2021-12-03 DIAGNOSIS — M25.562 ACUTE PAIN OF LEFT KNEE: ICD-10-CM

## 2021-12-03 DIAGNOSIS — R26.2 DIFFICULTY WALKING: ICD-10-CM

## 2021-12-03 DIAGNOSIS — Z98.890 S/P LEFT KNEE ARTHROSCOPY: Primary | ICD-10-CM

## 2021-12-03 PROCEDURE — 97530 THERAPEUTIC ACTIVITIES: CPT | Performed by: PHYSICAL THERAPIST

## 2021-12-03 PROCEDURE — 97140 MANUAL THERAPY 1/> REGIONS: CPT | Performed by: PHYSICAL THERAPIST

## 2021-12-03 PROCEDURE — 97110 THERAPEUTIC EXERCISES: CPT | Performed by: PHYSICAL THERAPIST

## 2021-12-03 NOTE — PROGRESS NOTES
Physical Therapy Daily Treatment Note      Patient: Pedro Katz   : 1970  Referring practitioner: Eric Lopez MD  Date of Initial Visit: Type: THERAPY  Noted: 10/25/2021  Today's Date: 12/3/2021  Patient seen for 10 sessions         Pedro Katz reports: started prednisone that MD gave him for knee swelling.  Movement is better but still limited         Subjective     Objective   See Exercise, Manual, and Modality Logs for complete treatment.   Added: LE bike x 6 min and recumbent bike x 5 min, TKE L with green t band and seated hs curl with green t band and sink squats    Assessment/Plan  Able to progress exercise regimen without increased left knee symptoms.  Still with ROM limitations but improved mobility/flexibiltiy post manual interventions and bike performance.         Other  Progress exercise/activity per protocol.  Continue manual efforts to improve left knee mobility/flexibility.           Timed:  Manual Therapy:   12      mins  57174;  Therapeutic Exercise:  20       mins  71005;     Neuromuscular Maribel:        mins  70169;    Therapeutic Activity:   10       mins  82807;     Gait Training:           mins  92214;     Ultrasound:          mins  98414;      Untimed:  Electrical Stimulation:         mins  06760 ( );  Mechanical Traction:         mins  58288;     Timed Treatment:   42   mins   Total Treatment:    42    mins  Gonzalo Cash PTA  Physical Therapist  Assistant  [id number]

## 2021-12-10 ENCOUNTER — OFFICE VISIT (OUTPATIENT)
Dept: SLEEP MEDICINE | Facility: HOSPITAL | Age: 51
End: 2021-12-10

## 2021-12-10 VITALS
DIASTOLIC BLOOD PRESSURE: 86 MMHG | BODY MASS INDEX: 28.14 KG/M2 | HEART RATE: 67 BPM | SYSTOLIC BLOOD PRESSURE: 140 MMHG | WEIGHT: 190 LBS | HEIGHT: 69 IN

## 2021-12-10 DIAGNOSIS — R06.83 SNORING: ICD-10-CM

## 2021-12-10 DIAGNOSIS — G47.30 OBSERVED SLEEP APNEA: Primary | ICD-10-CM

## 2021-12-10 DIAGNOSIS — G47.8 NON-RESTORATIVE SLEEP: ICD-10-CM

## 2021-12-10 DIAGNOSIS — G47.00 INSOMNIA, UNSPECIFIED TYPE: ICD-10-CM

## 2021-12-10 PROCEDURE — 99244 OFF/OP CNSLTJ NEW/EST MOD 40: CPT | Performed by: INTERNAL MEDICINE

## 2021-12-10 PROCEDURE — G0463 HOSPITAL OUTPT CLINIC VISIT: HCPCS

## 2021-12-10 NOTE — PROGRESS NOTES
[facility]  [address]  [address]  [place] [address]  Phone   Fax       Pedro VALENZUELA Gianna  1970  50 y.o.  male      Referring physician/provider Dr. Eric Lopez MD  PCP Armen Castro Jr., DO    Type of service: Initial Sleep Medicine Consult.  Date of service: 12/10/2021      Chief Complaint   Patient presents with   • Witnessed Apnea   • Snoring   • Fatigue   • Non-restorative Sleep       History of present illness;  Thank you for asking to see Pedro VALENZUELA Gianna, 50 y.o.. The patient was seen today on 12/10/2021 at [facility].  The patient presents today with symptoms of snoring, non-restorative sleep and witnessed apneas. The symptoms are present for 2 3 years and they are persistent in nature.  The snoring is present in all positions and it is loud.  Has  history of prior sleep evaluation and sleep studies 10 years ago at [facility] and was told that he has mild sleep apnea and did not treat him with CPAP.. Patient has no prior surgery namely tonsillectomy, nasal surgery and UPPP.  Recently underwent knee surgery where he was told that he has sleep apnea and referred him for further evaluation    Patient gives the following sleep history.  Sleep schedule:  Bedtime: 11 PM  Wake time: 7 AM  Normally takes about 10 minutes to fall asleep  Average hours of sleep 7.5  Number of naps per day 0  Symptoms  In addition to snoring, nonrestorative sleep and witnessed apneas patient gives the following associated symptoms.  Have you ever awakened gasping for breath, coughing, choking: Yes   Change in weight,  Yes gained 10 pounds  Morning headaches  No   Awaken with a sore throat or dry mouth  Yes   Leg jerking at night:  Yes   Crawly feeling/urge sensation to move in the legs: No   Teeth grinding:Yes   Have you ever awakened at night with a sour taste or burning sensation in your chest:  No   Do you have muscle weakness  "with laughing or anger or sleep paralysis:  No   Have you ever felt paralyzed while going to sleep or waking up:  No   Sleepwalking, nightmares, No   Nocturia (urination at night): 1 times per night  Memory Problem:No     MEDICAL CONDITIONS (PMH)  1. Hyperlipidemia  2. Insomnia  3. Depression    Social history:  Do you drive a commercial vehicle:  No   Shift work:  No   Tobacco use:  No   Alcohol use: 5 per week  Caffeinated drinks: 2    Family Hx (your parents and siblings)  1. Sleep apnea with the father and brother    Medications: reviewed    Review of systems:  Sleep: Positve for snoring,witnessed apnea and daytime excessive sleepiness with Chaffee Sleepiness Scale of Total score: 3   Kidney/ Bladder  Difficulty In Urination: negative  Bed Wetting: negative  Frequent Urination: negative  HEENT  Recurrent Nose Bleeds: negative  Ear pain: negative  Sores In Mouth: negative  Persistent Hoarseness: negative  Nasal Congestion: negative  Post Nasal Drip: negative  Musculoskeletal:  Neck Pain: negative  Temporomandibular Joint Pain: negative  General:  Fever: negative  Fatigue: positive  Cardiovascular:  Irregular Heartbeat: negative  Swollen Ankles Or Legs: negative  Respiratory:  Shortness Of Breath: negative  Wheezing: negative  Neuro/Paych:  Fainting Spells: negative  Dizziness: negative  Anxiety: negative  Depression: negative  Gastrointestinal:  Problem Swallowing: negative  Frequent Heartburn: negative  Abdominal Bloating: negative  Skin:  Rash: negative  Change In Nails: negative  Endocrine:  Excessive Thirst: negative  Always Too Cold: negative  Always Too Warm: negative  Hem/Lymphatic:  Swollen Glands: negative  Burses/ Bleeds Easily: negative    Physical exam:  CONSTITUTINONAL:  Vitals:    12/10/21 0900   BP: 140/86   Pulse: 67   Weight: 86.2 kg (190 lb)   Height: 175.3 cm (69\")    Body mass index is 28.06 kg/m².   HEAD: atraumatic, normocephalic   EYES:pupils are round, equal and reacting to light and " accommodation, conjunctiva normal  NOSE:no nasal septal defects, nasal passages are clear, no nasal polyps,   THROAT: tonsils are not enlarged, tongue normal size, oral airway Mallampati class 4  NECK:Neck Circumference: 15.5 inches, trachea is in the midline, thyroid not enlarged  RESPIRATORY SYSTEM: Breath sounds are normal, there are no wheezes  CARDIOVASULAR SYSTEM: Heart sounds are regular rhythm and normal rate, there are no murmurs or thrills, no edema  GASTROINTESTINAL: Soft and non-tender,liver not enlarged, no evidence of ascites  MUSCULOSKELETAL SYSTEM: Full range of motion's in all the 4 extremities, neck movement not restricted, temporomandibular joint movement normal and no tenderness  SKIN: Warm and moist, no cyanosis, no clubbing,  NEUROLOGICAL SYSTEM: Oriented x 3, no gross neurological defects, No speech defect, gait is normal  PSYCHIATRIC SYSTEM: Mood is normal, thought content is normal      Assessment and plan:  · Witnessed apneas,(R06.81) patient's symptoms and examination is consistent with sleep apnea (G47.30). I have talked to the patient about the signs and symptoms of sleep apnea. In addition, I have also discussed pathophysiology of sleep apnea.  I also discussed the complications of untreated sleep apnea including effects on hypertension, diabetes mellitus and nonrestorative sleep with hypersomnia which can increase risk for motor vehicle accidents.  Untreated sleep apnea is also a risk factor for development of atrial fibrillation, pulmonary hypertension and stroke.  Discussed in detail of various testing methods including home-based and lab based sleep studies.  Based on history and physical examination and other comorbidities the most appropriate study is home sleep test.  The order for the sleep study is placed in [facility].  The test will be scheduled after approval from insurance. Treatment and management will be discussed after the test is completed.  Patient was given opportunity to  ask questions and all the questions were answered.   · Snoring (R06.83), snoring is the sound created by turbulent airflow vibrating upper airway soft tissue due to limitation of inspiratory airflow. I have also discussed factors affecting snoring including sleep deprivation, sleeping on the back and alcohol ingestion. To minimize snoring, patient is advised to have adequate sleep, sleep on the side and avoid alcohol and sedative medications before bedtime  · Overweight, patient's BMI is Body mass index is 28.06 kg/m².. I have discussed the relationship between weight and sleep apnea.There is direct correlation between weight and severity of sleep apnea.  Weight reduction is encouraged, as it is going to reduce the severity of sleep apnea. I have also discussed with the patient diet and exercise to achieve ideal body weight.    I have also discussed with the patient the following  • Sleep hygiene: Maintaining a regular bedtime and wake time, not to watch television or work in bed, limit caffeine-containing beverages before bed time and avoid naps during the day  • Adequate amount of sleep.  Generally most people needs about 7 to 8 hours of sleep.    Return for 31 to 90 days after PAP setup with down load..  Patient's questions were answered      I once again thank you for asking me to see this patient in consultation and I have forwarded my opinion and treatment plan.  Please do not hesitate to call me if you have any questions.     Heidy Chin MD  Sleep Medicine  Medical Director, [facility]  12/10/2021 ,

## 2021-12-22 ENCOUNTER — TREATMENT (OUTPATIENT)
Dept: PHYSICAL THERAPY | Facility: CLINIC | Age: 51
End: 2021-12-22

## 2021-12-22 ENCOUNTER — OFFICE VISIT (OUTPATIENT)
Dept: SPORTS MEDICINE | Facility: CLINIC | Age: 51
End: 2021-12-22

## 2021-12-22 VITALS
WEIGHT: 190 LBS | BODY MASS INDEX: 28.14 KG/M2 | HEIGHT: 69 IN | OXYGEN SATURATION: 98 % | RESPIRATION RATE: 16 BRPM | SYSTOLIC BLOOD PRESSURE: 140 MMHG | HEART RATE: 66 BPM | TEMPERATURE: 97.1 F | DIASTOLIC BLOOD PRESSURE: 90 MMHG

## 2021-12-22 DIAGNOSIS — M25.562 ACUTE PAIN OF LEFT KNEE: ICD-10-CM

## 2021-12-22 DIAGNOSIS — S86.812D PATELLAR TENDON RUPTURE, LEFT, SUBSEQUENT ENCOUNTER: ICD-10-CM

## 2021-12-22 DIAGNOSIS — R53.83 FATIGUE, UNSPECIFIED TYPE: ICD-10-CM

## 2021-12-22 DIAGNOSIS — R29.898 WEAKNESS OF LEFT LOWER EXTREMITY: ICD-10-CM

## 2021-12-22 DIAGNOSIS — R26.2 DIFFICULTY WALKING: ICD-10-CM

## 2021-12-22 DIAGNOSIS — F34.1 DYSTHYMIA: Primary | ICD-10-CM

## 2021-12-22 DIAGNOSIS — Z98.890 S/P LEFT KNEE ARTHROSCOPY: Primary | ICD-10-CM

## 2021-12-22 PROCEDURE — 97110 THERAPEUTIC EXERCISES: CPT | Performed by: PHYSICAL THERAPIST

## 2021-12-22 PROCEDURE — 97140 MANUAL THERAPY 1/> REGIONS: CPT | Performed by: PHYSICAL THERAPIST

## 2021-12-22 PROCEDURE — 97530 THERAPEUTIC ACTIVITIES: CPT | Performed by: PHYSICAL THERAPIST

## 2021-12-22 PROCEDURE — 99214 OFFICE O/P EST MOD 30 MIN: CPT | Performed by: FAMILY MEDICINE

## 2021-12-22 NOTE — PROGRESS NOTES
"Pedro is a 50 y.o. year old male presents to [facility]    Chief Complaint   Patient presents with   • Fatigue     eval for fatigue and feeling ran down for about 1 week now - lack of energy - no other sxs reported - here for further evaluation and treatment        History of Present Illness  \"I'm down.\" Associates low energy. Traveled to [place] last wk. No COVID sxs. Feels like he could sleep more than he does. No sig change in diet. Wt gain but has not been as physically active since L knee surgery. Prozac 20 mg qd, trazodone 50 mg qhs.    I have reviewed the patient's medical, family, and social history in detail and updated the computerized patient record.    /90 (BP Location: Right arm, Patient Position: Sitting, Cuff Size: Adult)   Pulse 66   Temp 97.1 °F (36.2 °C) (Temporal)   Resp 16   Ht 175.3 cm (69.02\")   Wt 86.2 kg (190 lb)   SpO2 98%   BMI 28.05 kg/m²      Physical Exam    Mask worn thru encounter  Vital signs reviewed.   General: No acute distress.  Eyes: conjunctiva clear; pupils equally round and reactive  ENT: external ears atraumatic  Neck: Supple, no lymphadenopathy.  Thyroid demonstrates no nodules  CV: no peripheral edema; S1, S2 no murmurs rubs or gallops  Resp: normal respiratory effort, no use of accessory muscles; CTA BL  Skin: no rashes or wounds; normal turgor  Psych: mood and affect appropriate; recent and remote memory intact  Neuro: sensation to light touch intact               Diagnoses and all orders for this visit:    Dysthymia    Fatigue, unspecified type  -     CBC & Differential  -     Comprehensive Metabolic Panel  -     TSH  -     T4, free  -     Vitamin D 25 Hydroxy  -     Vitamin B12    Labs as ordered.  I suspect that he is having more depression symptoms given his level of inactivity.  Hopefully this is a short period of time for him.  If labs are unremarkable, will increase Prozac to 40 mg daily.      Follow Up   No follow-ups on " file.  Patient was given instructions and counseling regarding his condition or for health maintenance advice. Please see specific information pulled into the AVS if appropriate.     EMR Dragon/Transcription disclaimer:    Much of this encounter note is an electronic transcription/translation of spoken language to printed text.  The electronic translation of spoken language may permit erroneous, or at times, nonsensical words or phrases to be inadvertently transcribed.  Although I have reviewed the note for such errors some may still exist.

## 2021-12-22 NOTE — PROGRESS NOTES
Physical Therapy Progress Note          2021  Eric Lopez MD    Re: Pedro Katz  ________________________________________________________________    Mr. Pedro Katz, has attended 6 additional PT sessions since last MD follow up for his left knee.      S: Mr. Pedro Katz states: left knee feels better since last MD/continuing PT.  Feels his ROM has improved and feels he is not able to walk without limping. Still has some pain and stiffness/discomfort live after being in a prolonged position.  Steroid pack seems like it helped.  Feels ~60% improved overall    Subjective Evaluation    Pain  Current pain rating: 3  At best pain ratin  At worst pain ratin  Quality: tight  Relieving factors: change in position and ice  Aggravating factors: prolonged positioning           Objective   AROM left knee 120 deg flexion(comfortably) and 125 deg flexion (uncomfortably)  AROM left knee extension 0 deg    MMT 4+5 left knee ext, 5-/5 left knee flexion; 4/5 left hip abduction/adduction, 4+/5 hip flexion      See Exercise, Manual, and Modality Logs for complete treatment.       Assessment/Plan   Compliant/Cooperative with rehab efforts to date for left quad/LE.  Subjectively reports that motion and walking has improved since steroid pack/continuing PT, though still has some stiffness/discomfort after being in a prolonged position for extended time.  Objectively, he presents with increased pain free AROM left knee as well as improved mm strength vs initially as  evidenced above.   Able to perform increased exercise/functional activity per protocol without increased symptoms or discomfort.  Progressing toward all goals as expected and should be ready to progress protocol phase based on time/week since surgery.       Other  To MD  with letter.  Await further orders regarding continued PT intervention           Manual Therapy:   12      mins  99869;  Therapeutic Exercise:   20      mins  20908;      Neuromuscular Maribel:        mins  74824;    Therapeutic Activity:   15       mins  30310;     Gait Training:           mins  69624;     Ultrasound:          mins  64517;    Electrical Stimulation:         mins  57736 ( );      Timed Treatment:  47    mins   Total Treatment:   47     mins    Gonzalo Cash PTA,   Physical Therapist Assistant # [id number]

## 2021-12-23 ENCOUNTER — TREATMENT (OUTPATIENT)
Dept: PHYSICAL THERAPY | Facility: CLINIC | Age: 51
End: 2021-12-23

## 2021-12-23 ENCOUNTER — OFFICE VISIT (OUTPATIENT)
Dept: ORTHOPEDIC SURGERY | Facility: CLINIC | Age: 51
End: 2021-12-23

## 2021-12-23 VITALS — BODY MASS INDEX: 28.14 KG/M2 | WEIGHT: 190 LBS | HEIGHT: 69 IN

## 2021-12-23 DIAGNOSIS — R29.898 WEAKNESS OF LEFT LOWER EXTREMITY: ICD-10-CM

## 2021-12-23 DIAGNOSIS — R26.2 DIFFICULTY WALKING: ICD-10-CM

## 2021-12-23 DIAGNOSIS — Z98.890 S/P LEFT KNEE ARTHROSCOPY: Primary | ICD-10-CM

## 2021-12-23 DIAGNOSIS — M25.562 ACUTE PAIN OF LEFT KNEE: ICD-10-CM

## 2021-12-23 DIAGNOSIS — S86.812D PATELLAR TENDON RUPTURE, LEFT, SUBSEQUENT ENCOUNTER: ICD-10-CM

## 2021-12-23 LAB
25(OH)D3+25(OH)D2 SERPL-MCNC: 24.9 NG/ML (ref 30–100)
ALBUMIN SERPL-MCNC: 5 G/DL (ref 4–5)
ALBUMIN/GLOB SERPL: 1.8 {RATIO} (ref 1.2–2.2)
ALP SERPL-CCNC: 51 IU/L (ref 44–121)
ALT SERPL-CCNC: 38 IU/L (ref 0–44)
AST SERPL-CCNC: 39 IU/L (ref 0–40)
BASOPHILS # BLD AUTO: 0 X10E3/UL (ref 0–0.2)
BASOPHILS NFR BLD AUTO: 0 %
BILIRUB SERPL-MCNC: 0.6 MG/DL (ref 0–1.2)
BUN SERPL-MCNC: 11 MG/DL (ref 6–24)
BUN/CREAT SERPL: 12 (ref 9–20)
CALCIUM SERPL-MCNC: 9.7 MG/DL (ref 8.7–10.2)
CHLORIDE SERPL-SCNC: 97 MMOL/L (ref 96–106)
CO2 SERPL-SCNC: 25 MMOL/L (ref 20–29)
CREAT SERPL-MCNC: 0.89 MG/DL (ref 0.76–1.27)
EOSINOPHIL # BLD AUTO: 0.1 X10E3/UL (ref 0–0.4)
EOSINOPHIL NFR BLD AUTO: 2 %
ERYTHROCYTE [DISTWIDTH] IN BLOOD BY AUTOMATED COUNT: 12.7 % (ref 11.6–15.4)
GLOBULIN SER CALC-MCNC: 2.8 G/DL (ref 1.5–4.5)
GLUCOSE SERPL-MCNC: 103 MG/DL (ref 65–99)
HCT VFR BLD AUTO: 41.5 % (ref 37.5–51)
HGB BLD-MCNC: 14.3 G/DL (ref 13–17.7)
IMM GRANULOCYTES # BLD AUTO: 0 X10E3/UL (ref 0–0.1)
IMM GRANULOCYTES NFR BLD AUTO: 0 %
LYMPHOCYTES # BLD AUTO: 2 X10E3/UL (ref 0.7–3.1)
LYMPHOCYTES NFR BLD AUTO: 36 %
MCH RBC QN AUTO: 30.8 PG (ref 26.6–33)
MCHC RBC AUTO-ENTMCNC: 34.5 G/DL (ref 31.5–35.7)
MCV RBC AUTO: 89 FL (ref 79–97)
MONOCYTES # BLD AUTO: 0.4 X10E3/UL (ref 0.1–0.9)
MONOCYTES NFR BLD AUTO: 7 %
NEUTROPHILS # BLD AUTO: 3 X10E3/UL (ref 1.4–7)
NEUTROPHILS NFR BLD AUTO: 55 %
PLATELET # BLD AUTO: 245 X10E3/UL (ref 150–450)
POTASSIUM SERPL-SCNC: 4.2 MMOL/L (ref 3.5–5.2)
PROT SERPL-MCNC: 7.8 G/DL (ref 6–8.5)
RBC # BLD AUTO: 4.64 X10E6/UL (ref 4.14–5.8)
SODIUM SERPL-SCNC: 137 MMOL/L (ref 134–144)
T4 FREE SERPL-MCNC: 1.06 NG/DL (ref 0.82–1.77)
TSH SERPL DL<=0.005 MIU/L-ACNC: 1.72 UIU/ML (ref 0.45–4.5)
VIT B12 SERPL-MCNC: 497 PG/ML (ref 232–1245)
WBC # BLD AUTO: 5.5 X10E3/UL (ref 3.4–10.8)

## 2021-12-23 PROCEDURE — 97110 THERAPEUTIC EXERCISES: CPT | Performed by: PHYSICAL THERAPIST

## 2021-12-23 PROCEDURE — 99024 POSTOP FOLLOW-UP VISIT: CPT | Performed by: ORTHOPAEDIC SURGERY

## 2021-12-23 PROCEDURE — 97530 THERAPEUTIC ACTIVITIES: CPT | Performed by: PHYSICAL THERAPIST

## 2021-12-23 NOTE — PROGRESS NOTES
CC: Follow-up status post left knee arthroscopy with Patella chondroplasty and partial patella tendon repair, DOS 10/22/2021     Interval history: Patient returns to the clinic today for follow-up status post left knee arthroscopy with Patella chondroplasty and partial patella tendon repair. The patient is doing well overall at this time with no new complaints of pain. He is experiencing mild tenderness with resisted extension but reports significant improvement with motion. The patient is currently attending physical therapy.     He notes plans of traveling to [place] in 02/2021 and inquires about any limitations regarding moderate activities such as jogging.    Exam:   Left Knee-  ROM 0 to 130 degrees  4+/5 on flexion  4+/5 on extension  Mild increased pain on resisted knee extension with knee held at 40 degrees of flexion.   Midline incision is healing well. There is a mild area of irritation from proximal edge of the incision.   Effusion- None       Impression: Status post left knee arthroscopy with patella tendon repair     Plan:     1.  Discussed treatment options at length with patient at today's visit.   2.  The patient is no longer required to utilize brace but advised use of knee sleeve in conjunction with KT taping   3.  He will continue with physical therapy treatment to focus on strengthening exercises and staying below a 90 degree flexion point. I recommended avoiding use of stairclimbing machines to avoid further irritation.  He may return to jogging with minimal to very light incline as tolerated.  4.  I will follow up with the patient in 6 weeks for reevaluation of progress. No x-rays will be obtained at that time.   5.  Follow up: 6 weeks      The patient has consented to being recorded using SARI  Transcribed from ambient dictation for Eric Lopez MD by Tasneem Restrepo.  12/23/21   14:11 EST    Patient verbalized consent to the visit recording.  I have personally performed the services  described in this document as transcribed by the above individual, and it is both accurate and complete.  Eric Lopez MD  12/23/2021  17:56 EST

## 2021-12-27 ENCOUNTER — TREATMENT (OUTPATIENT)
Dept: PHYSICAL THERAPY | Facility: CLINIC | Age: 51
End: 2021-12-27

## 2021-12-27 ENCOUNTER — HOSPITAL ENCOUNTER (OUTPATIENT)
Dept: SLEEP MEDICINE | Facility: HOSPITAL | Age: 51
Discharge: HOME | End: 2021-12-27
Admitting: INTERNAL MEDICINE

## 2021-12-27 DIAGNOSIS — G47.8 NON-RESTORATIVE SLEEP: ICD-10-CM

## 2021-12-27 DIAGNOSIS — S86.812D PATELLAR TENDON RUPTURE, LEFT, SUBSEQUENT ENCOUNTER: ICD-10-CM

## 2021-12-27 DIAGNOSIS — Z98.890 S/P LEFT KNEE ARTHROSCOPY: Primary | ICD-10-CM

## 2021-12-27 DIAGNOSIS — G47.00 INSOMNIA, UNSPECIFIED TYPE: ICD-10-CM

## 2021-12-27 DIAGNOSIS — R06.83 SNORING: ICD-10-CM

## 2021-12-27 DIAGNOSIS — R26.2 DIFFICULTY WALKING: ICD-10-CM

## 2021-12-27 DIAGNOSIS — M25.562 ACUTE PAIN OF LEFT KNEE: ICD-10-CM

## 2021-12-27 DIAGNOSIS — G47.30 OBSERVED SLEEP APNEA: ICD-10-CM

## 2021-12-27 DIAGNOSIS — R29.898 WEAKNESS OF LEFT LOWER EXTREMITY: ICD-10-CM

## 2021-12-27 PROCEDURE — 97110 THERAPEUTIC EXERCISES: CPT | Performed by: PHYSICAL THERAPIST

## 2021-12-27 PROCEDURE — 95806 SLEEP STUDY UNATT&RESP EFFT: CPT

## 2021-12-27 PROCEDURE — 95806 SLEEP STUDY UNATT&RESP EFFT: CPT | Performed by: INTERNAL MEDICINE

## 2021-12-29 ENCOUNTER — TREATMENT (OUTPATIENT)
Dept: PHYSICAL THERAPY | Facility: CLINIC | Age: 51
End: 2021-12-29

## 2021-12-29 DIAGNOSIS — R26.2 DIFFICULTY WALKING: ICD-10-CM

## 2021-12-29 DIAGNOSIS — S86.812D PATELLAR TENDON RUPTURE, LEFT, SUBSEQUENT ENCOUNTER: ICD-10-CM

## 2021-12-29 DIAGNOSIS — Z98.890 S/P LEFT KNEE ARTHROSCOPY: Primary | ICD-10-CM

## 2021-12-29 DIAGNOSIS — R29.898 WEAKNESS OF LEFT LOWER EXTREMITY: ICD-10-CM

## 2021-12-29 PROCEDURE — 97110 THERAPEUTIC EXERCISES: CPT | Performed by: PHYSICAL THERAPIST

## 2022-01-05 ENCOUNTER — TREATMENT (OUTPATIENT)
Dept: PHYSICAL THERAPY | Facility: CLINIC | Age: 52
End: 2022-01-05

## 2022-01-05 DIAGNOSIS — S86.812D PATELLAR TENDON RUPTURE, LEFT, SUBSEQUENT ENCOUNTER: ICD-10-CM

## 2022-01-05 DIAGNOSIS — Z98.890 S/P LEFT KNEE ARTHROSCOPY: Primary | ICD-10-CM

## 2022-01-05 PROCEDURE — 97110 THERAPEUTIC EXERCISES: CPT | Performed by: PHYSICAL THERAPIST

## 2022-01-05 PROCEDURE — 97140 MANUAL THERAPY 1/> REGIONS: CPT | Performed by: PHYSICAL THERAPIST

## 2022-01-10 ENCOUNTER — TREATMENT (OUTPATIENT)
Dept: PHYSICAL THERAPY | Facility: CLINIC | Age: 52
End: 2022-01-10

## 2022-01-10 DIAGNOSIS — R29.898 WEAKNESS OF LEFT LOWER EXTREMITY: ICD-10-CM

## 2022-01-10 DIAGNOSIS — R26.2 DIFFICULTY WALKING: ICD-10-CM

## 2022-01-10 DIAGNOSIS — Z98.890 S/P LEFT KNEE ARTHROSCOPY: Primary | ICD-10-CM

## 2022-01-10 DIAGNOSIS — S86.812D PATELLAR TENDON RUPTURE, LEFT, SUBSEQUENT ENCOUNTER: ICD-10-CM

## 2022-01-10 PROCEDURE — 97530 THERAPEUTIC ACTIVITIES: CPT | Performed by: PHYSICAL THERAPIST

## 2022-01-10 PROCEDURE — 97110 THERAPEUTIC EXERCISES: CPT | Performed by: PHYSICAL THERAPIST

## 2022-01-10 NOTE — PROGRESS NOTES
Re-Assessment / Re-Certification    Patient: Pedro Katz   : 1970  Diagnosis/ICD-10 Code:  S/P left knee arthroscopy [Z98.890]  Referring practitioner: Eric Lopez MD  Date of Initial Visit: 10/25/2021  Today's Date: 1/10/2022  Patient seen for 15 sessions      Subjective:   Pedro Katz reports: had been progressing well, but notes increased pain over the past week. Is concerned because it feels similar to pre-op pain. No falls or other trauma reported. Has been using elliptical.  Subjective Questionnaire: LEFS: 39/80  Clinical Progress: overall improved since starting PT, but worsening pain over the past week  Home Program Compliance: Yes  Treatment has included: therapeutic exercise, neuromuscular re-education and therapeutic activity    Subjective   Objective          Active Range of Motion   Left Knee   Flexion: 127 (AAROM) degrees       Assessment/Plan  Progress toward previous goals: Partially Met    SHORT TERM GOALS: 4-6 weeks Pt will:  1. Be independent and adherent with HEP and post op restrictions. (MET)  2. Demo left knee AAROM 0-100 degrees. (MET)  3. Perform SLR without extensor lag to prepare for ambulation without brace. (MET)    LONG TERM GOALS: 12 weeks Pt will:  1. Demo left knee AROM 0-135 degrees.(PROGRESSING)  2. Demo LLE strength 5/5 to prepare for running, biking. (PROGRESSING)  3. Demo SLS 20 seconds or better to prepare for ambulation on uneven terrain. (NOT MET)  4. Demo ability to walk on treadmill at 3.0 MPH for 20 minutes without pain and with symmetrical gait pattern to prepare for running. (NOT MET)  5. Demo squat with adequate glut activation and control of dynamic genu valgus. (NOT MET)  6. LEFS 70/80 or better. (PROGRESSING)      Recommendations: Continue as planned. Advised pt to reduce exercise intensity, discussed which exercises to do and which to avoid. Also increase stretching to 2x daily. Will reassess at next visit.   Timeframe: 1 month  Prognosis to  achieve goals: good    PT Signature: Dara Appiah, PT, DPT                         Physical Therapist                         [place] License #[id number]    Based upon review of the patient's progress and continued therapy plan, it is my medical opinion that Pedro Katz should continue physical therapy treatment at [facility]  [address], [address] [address] [phone].    Signature: __________________________________  Eric Lopez MD    Manual Therapy:    0     mins  48055;  Therapeutic Exercise:    25     mins  30948;     Neuromuscular Maribel:    0    mins  99423;    Therapeutic Activity:     18     mins  33446;     Gait Trainin     mins  83396;     Ultrasound:     0     mins  77466;    Electrical Stimulation:    0     mins  95520 ( );    Timed Treatment:   43   mins   Total Treatment:     43   mins

## 2022-01-11 ENCOUNTER — TELEPHONE (OUTPATIENT)
Dept: SLEEP MEDICINE | Facility: HOSPITAL | Age: 52
End: 2022-01-11

## 2022-01-11 DIAGNOSIS — F34.1 DYSTHYMIA: ICD-10-CM

## 2022-01-11 RX ORDER — FLUOXETINE HYDROCHLORIDE 40 MG/1
40 CAPSULE ORAL DAILY
Qty: 90 CAPSULE | Refills: 3 | Status: SHIPPED | OUTPATIENT
Start: 2022-01-11 | End: 2023-01-20

## 2022-01-11 NOTE — TELEPHONE ENCOUNTER
Patient requested new rx of increased dose of Prozac be called in to [facility] for him.     Thanks  Ruby

## 2022-01-11 NOTE — TELEPHONE ENCOUNTER
Called patient to provide HST results. Sending order to [facility]. Scheduled patient for a compliance visit for 3/25/2022.

## 2022-01-14 ENCOUNTER — TREATMENT (OUTPATIENT)
Dept: PHYSICAL THERAPY | Facility: CLINIC | Age: 52
End: 2022-01-14

## 2022-01-14 DIAGNOSIS — S86.812D PATELLAR TENDON RUPTURE, LEFT, SUBSEQUENT ENCOUNTER: ICD-10-CM

## 2022-01-14 DIAGNOSIS — Z98.890 S/P LEFT KNEE ARTHROSCOPY: Primary | ICD-10-CM

## 2022-01-14 DIAGNOSIS — R29.898 WEAKNESS OF LEFT LOWER EXTREMITY: ICD-10-CM

## 2022-01-14 DIAGNOSIS — R26.2 DIFFICULTY WALKING: ICD-10-CM

## 2022-01-14 PROCEDURE — 97014 ELECTRIC STIMULATION THERAPY: CPT | Performed by: PHYSICAL THERAPIST

## 2022-01-14 PROCEDURE — 97140 MANUAL THERAPY 1/> REGIONS: CPT | Performed by: PHYSICAL THERAPIST

## 2022-01-14 PROCEDURE — 97110 THERAPEUTIC EXERCISES: CPT | Performed by: PHYSICAL THERAPIST

## 2022-01-14 NOTE — PROGRESS NOTES
" Physical Therapy Daily Progress Note    Visit #16    Subjective     Pedro Katz reports: knee pain is \"the same or worse\". Altered HEP as instructed. Is having back pain as well.       Objective   See Exercise, Manual, and Modality Logs for complete treatment.       Assessment/Plan  Advised pt to contact MD regarding worsening pain. Focused on hip strengthening in clinic today.  Progress per Plan of Care           Manual Therapy:    18     mins  54789;  Therapeutic Exercise:    15     mins  86458;     Neuromuscular Maribel:    0    mins  65467;    Therapeutic Activity:     0     mins  09713;     Gait Trainin     mins  18937;     Ultrasound:     0     mins  36287;    Electrical Stimulation:    15     mins  50902 ( );    Timed Treatment:   48   mins   Total Treatment:     48   mins    Dara Appiah PT, DPT  Physical Therapist  [place] License #[id number]                    "

## 2022-01-17 NOTE — PROGRESS NOTES
Physical Therapy Daily Treatment Note      Patient: Pedro Katz   : 1970  Referring practitioner: Eric Lopez MD  Date of Initial Visit: Type: THERAPY  Noted: 10/25/2021  Today's Date: 21  Patient seen for 12 sessions         Pedro Katz reports: saw MD today who was pleased with my motion.  Took me out of my brace and said ok to increase activity.          Subjective     Objective     See Exercise, Manual, and Modality Logs for complete treatment.   Therapeutic exercises performed on Cable machine today consisting of:  TKE L LE 25 lbs 3 x 10  TKE L LE with contralateral step up(4 in height) 25 lbs 3 x 10  Seated hs curl L LE 5lbs 3 x 10  4 way walkouts fwd/bwd 12.5 lbs x 5 each; side/side 10 lbs x 5 each    Elliptical machine x 5 min    Leg press with ball squeeze 100 lbs 3 x 10  Calf raises B  lbs 3 x 10      Assessment/Plan  Exhibited good tolerance/capablity for advanced isotonic strengthening activities without increased L knee complaints this session.  Gait remains antalgic on left.        Progress strengthening /stabilization /functional activity as symptoms allow/per protocol           Timed:  Manual Therapy:   mins  22831;  Therapeutic Exercise:   20    mins  74822;     Neuromuscular Maribel:        mins  69538;    Therapeutic Activity:     10     mins  53647;     Gait Training:           mins  31711;     Ultrasound:          mins  32571;      Untimed:  Electrical Stimulation:         mins  81680 ( );  Mechanical Traction:         mins  30277;     Timed Treatment:    30  mins   Total Treatment:  30      mins  Gonzalo Cash PTA  Physical Therapist  Assistant  [id number]

## 2022-01-19 RX ORDER — PREDNISONE 10 MG/1
TABLET ORAL
Qty: 39 TABLET | Refills: 0 | Status: SHIPPED | OUTPATIENT
Start: 2022-01-19 | End: 2022-03-24

## 2022-01-21 ENCOUNTER — TELEPHONE (OUTPATIENT)
Dept: SPORTS MEDICINE | Facility: CLINIC | Age: 52
End: 2022-01-21

## 2022-01-21 ENCOUNTER — TREATMENT (OUTPATIENT)
Dept: PHYSICAL THERAPY | Facility: CLINIC | Age: 52
End: 2022-01-21

## 2022-01-21 DIAGNOSIS — R26.2 DIFFICULTY WALKING: ICD-10-CM

## 2022-01-21 DIAGNOSIS — Z98.890 S/P LEFT KNEE ARTHROSCOPY: Primary | ICD-10-CM

## 2022-01-21 DIAGNOSIS — R29.898 WEAKNESS OF LEFT LOWER EXTREMITY: ICD-10-CM

## 2022-01-21 DIAGNOSIS — G47.00 INSOMNIA, UNSPECIFIED TYPE: ICD-10-CM

## 2022-01-21 DIAGNOSIS — S86.812D PATELLAR TENDON RUPTURE, LEFT, SUBSEQUENT ENCOUNTER: ICD-10-CM

## 2022-01-21 PROCEDURE — 97110 THERAPEUTIC EXERCISES: CPT | Performed by: PHYSICAL THERAPIST

## 2022-01-21 RX ORDER — TRAZODONE HYDROCHLORIDE 50 MG/1
50 TABLET ORAL NIGHTLY
Qty: 7 TABLET | Refills: 0 | Status: SHIPPED | OUTPATIENT
Start: 2022-01-21 | End: 2022-08-31

## 2022-01-21 NOTE — TELEPHONE ENCOUNTER
[facility] mail service wanted to know if [facility]. I spoke with  in person and states that it is okay to send in prescription. No further action needed.

## 2022-01-21 NOTE — PROGRESS NOTES
Physical Therapy Daily Treatment Note      Patient: Pedro Katz   : 1970  Referring practitioner: Eric Lopez MD  Date of Initial Visit: Type: THERAPY  Noted: 10/25/2021  Today's Date: 2022  Patient seen for 17 sessions       Visit Diagnoses:    ICD-10-CM ICD-9-CM   1. S/P left knee arthroscopy  Z98.890 V45.89   2. Patellar tendon rupture, left, subsequent encounter  S86.812D V58.89     844.8   3. Weakness of left lower extremity  R29.898 729.89   4. Difficulty walking  R26.2 719.7       Subjective   Increased pain in knee the past 2 weeks. Pain is diffuse, not specific to patellar tendon.  Objective          Tenderness   Left Knee   Tenderness in the patellar tendon.     Strength/Myotome Testing     Left Knee   Extension: 5 (Mild pain)      See Exercise, Manual, and Modality Logs for complete treatment.       Assessment/Plan  No significant change in pain with patellar tendon mobilization. Deferred squat and leg press. Taught pt how to perform ice massage and recommended after sitting for travel/football game this weekend.    Timed:         Manual Therapy:    0     mins  48576;     Therapeutic Exercise:    45     mins  72262;     Neuromuscular Maribel:    0    mins  81616;    Therapeutic Activity:     0     mins  21064;     Gait Trainin     mins  93455;     Ultrasound:     0     mins  65544;    Ionto                               0    mins   21650        Timed Treatment:   45   mins   Total Treatment:     45   mins    Sunshine Vargas PT  [place] License: [id number]

## 2022-01-25 ENCOUNTER — TREATMENT (OUTPATIENT)
Dept: PHYSICAL THERAPY | Facility: CLINIC | Age: 52
End: 2022-01-25

## 2022-01-25 DIAGNOSIS — M25.562 ACUTE PAIN OF LEFT KNEE: ICD-10-CM

## 2022-01-25 DIAGNOSIS — S86.812D PATELLAR TENDON RUPTURE, LEFT, SUBSEQUENT ENCOUNTER: ICD-10-CM

## 2022-01-25 DIAGNOSIS — R29.898 WEAKNESS OF LEFT LOWER EXTREMITY: ICD-10-CM

## 2022-01-25 DIAGNOSIS — R26.2 DIFFICULTY WALKING: ICD-10-CM

## 2022-01-25 DIAGNOSIS — Z98.890 S/P LEFT KNEE ARTHROSCOPY: Primary | ICD-10-CM

## 2022-01-25 PROCEDURE — 97110 THERAPEUTIC EXERCISES: CPT | Performed by: PHYSICAL THERAPIST

## 2022-01-25 PROCEDURE — 97530 THERAPEUTIC ACTIVITIES: CPT | Performed by: PHYSICAL THERAPIST

## 2022-01-27 ENCOUNTER — TREATMENT (OUTPATIENT)
Dept: PHYSICAL THERAPY | Facility: CLINIC | Age: 52
End: 2022-01-27

## 2022-01-27 DIAGNOSIS — Z98.890 S/P LEFT KNEE ARTHROSCOPY: Primary | ICD-10-CM

## 2022-01-27 DIAGNOSIS — S86.812D PATELLAR TENDON RUPTURE, LEFT, SUBSEQUENT ENCOUNTER: ICD-10-CM

## 2022-01-27 DIAGNOSIS — R26.2 DIFFICULTY WALKING: ICD-10-CM

## 2022-01-27 DIAGNOSIS — R29.898 WEAKNESS OF LEFT LOWER EXTREMITY: ICD-10-CM

## 2022-01-27 DIAGNOSIS — M25.562 ACUTE PAIN OF LEFT KNEE: ICD-10-CM

## 2022-01-27 PROCEDURE — 97110 THERAPEUTIC EXERCISES: CPT | Performed by: PHYSICAL THERAPIST

## 2022-01-27 PROCEDURE — 97530 THERAPEUTIC ACTIVITIES: CPT | Performed by: PHYSICAL THERAPIST

## 2022-01-27 PROCEDURE — 97112 NEUROMUSCULAR REEDUCATION: CPT | Performed by: PHYSICAL THERAPIST

## 2022-01-27 NOTE — PROGRESS NOTES
Physical Therapy Daily Progress Note    Patient: Pedro Katz   : 1970  Diagnosis/ICD-10 Code:  S/P left knee arthroscopy [Z98.890]  Referring practitioner: Eric Lopez MD  Date of Initial Visit: Type: THERAPY  Noted: 10/25/2021  Today's Date: 2022  Patient seen for 19 sessions         Pedro Katz reports: no new changes.    Subjective   Pain:  At rest: 0/10  Walking on flat ground: 0/10 (increases with unsteady ground or incline/decline)  Stairs: 6/10    Objective   See Exercise, Manual, and Modality Logs for complete treatment.       Assessment/Plan  Subjectively, pt reports no increase of pain or discomfort with interventions performed today. Performed well with [facility] focused strengthening interventions. Pt has hit a plateau in progress with PT. Plan to see MD on 2/3 and follow up as recommended. Pt is compliant with HEP and is able to perform them independently at this point in time, updated gabino.    Awaiting MD orders.           Manual Therapy:         mins  00004;  Therapeutic Exercise:    20     mins  89565;     Neuromuscular Maribel:    10    mins  72506;    Therapeutic Activity:     10     mins  46500;     Gait Training:           mins  21763;     Ultrasound:          mins  03838;    Electrical Stimulation:         mins  96764 ( );  Dry Needling          mins self-pay    Timed Treatment:   40   mins   Total Treatment:     45   mins    Flores Dominguez PTA  Physical Therapist Assistant [phone]

## 2022-02-07 ENCOUNTER — OFFICE VISIT (OUTPATIENT)
Dept: ORTHOPEDIC SURGERY | Facility: CLINIC | Age: 52
End: 2022-02-07

## 2022-02-07 VITALS — HEIGHT: 69 IN | WEIGHT: 190 LBS | BODY MASS INDEX: 28.14 KG/M2

## 2022-02-07 DIAGNOSIS — S86.812A PATELLAR TENDON RUPTURE, LEFT, INITIAL ENCOUNTER: ICD-10-CM

## 2022-02-07 DIAGNOSIS — Z98.890 S/P LEFT KNEE ARTHROSCOPY: Primary | ICD-10-CM

## 2022-02-07 DIAGNOSIS — M22.42 CHONDROMALACIA OF PATELLOFEMORAL JOINT, LEFT: ICD-10-CM

## 2022-02-07 PROCEDURE — 99213 OFFICE O/P EST LOW 20 MIN: CPT | Performed by: ORTHOPAEDIC SURGERY

## 2022-02-07 PROCEDURE — 20610 DRAIN/INJ JOINT/BURSA W/O US: CPT | Performed by: ORTHOPAEDIC SURGERY

## 2022-02-07 RX ADMIN — LIDOCAINE HYDROCHLORIDE 8 ML: 10 INJECTION, SOLUTION EPIDURAL; INFILTRATION; INTRACAUDAL; PERINEURAL at 16:24

## 2022-02-07 RX ADMIN — TRIAMCINOLONE ACETONIDE 80 MG: 40 INJECTION, SUSPENSION INTRA-ARTICULAR; INTRAMUSCULAR at 16:24

## 2022-02-07 NOTE — PROGRESS NOTES
The patient has consented to being recorded using SARI.  Subjective:     Patient ID: Pedro Katz is a 51 y.o. male.    Chief Complaint:  Follow-up status post left knee arthroscopy with Patella chondroplasty and partial patella tendon repair, DOS 10/22/2021  Prednisone taper ordered 1/19/2022  History of Present Illness  Pedro Katz returns to clinic today for evaluation of status post left knee arthroscopy.    The patient notes he was doing well since his previous visit in 12/2021, until a few week later. At which time he had an onset of pain, exacerbated with climbing stairs and squats. He denies any prior injury or trauma to the onset of his injury. He expresses his pain feels similar to preoperatively. He localizes the pain to the anterior aspect of the knee, with associated swelling. The patient denies working on exercises, but has been focusing on strengthening of his knee. He denies improvement with previous oral steroid. The patient states that he is not taking any anti-inflammatories at this time.    Of note, he reports an upcoming trip to [place] with his wife for there 20-year anniversary.      Social History     Occupational History   • Not on file   Tobacco Use   • Smoking status: Never Smoker   • Smokeless tobacco: Never Used   Vaping Use   • Vaping Use: Never used   Substance and Sexual Activity   • Alcohol use: Yes     Alcohol/week: 5.0 standard drinks     Types: 5 Cans of beer per week     Comment: social    • Drug use: No   • Sexual activity: Yes     Partners: Female     Birth control/protection: None     Comment: Wife only      Past Medical History:   Diagnosis Date   • Allergic 1982   • Benign prostatic hyperplasia with urinary obstruction    • Depression    • Humerus fracture     as a child   • Hypercholesterolemia    • Mixed hyperlipidemia    • Seizure (HCC)     had a seizure in 1999 on seizure meds short term resolved off for 20yrs meds   • Skin cancer     1999   • Stenosis of carotid  "artery    • Visual impairment      Past Surgical History:   Procedure Laterality Date   • FASCIOTOMY Left 2002    left leg   • KNEE ARTHROSCOPY Left 10/22/2021    Procedure: KNEE ARTHROSCOPY,ARTHROSCOPIC PARTIAL PATTELLAR CHONDROPLASTY.;  Surgeon: Eric Lopez MD;  Location: [facility] OR;  Service: Orthopedics;  Laterality: Left;   • PATELLA TENDON REPAIR Left 10/22/2021    Procedure: PATELLA TENDON REPAIR;  Surgeon: Eric Lopez MD;  Location: [facility] OR;  Service: Orthopedics;  Laterality: Left;       History reviewed. No pertinent family history.      Review of Systems   Constitutional: Negative for chills, diaphoresis, fever and unexpected weight change.   HENT: Negative for hearing loss, nosebleeds, sore throat and tinnitus.    Eyes: Negative for pain and visual disturbance.   Respiratory: Negative for cough, shortness of breath and wheezing.    Cardiovascular: Negative for chest pain and palpitations.   Gastrointestinal: Negative for abdominal pain, diarrhea, nausea and vomiting.   Endocrine: Negative for cold intolerance, heat intolerance and polydipsia.   Genitourinary: Negative for difficulty urinating, dysuria and hematuria.   Musculoskeletal: Positive for arthralgias, joint swelling and myalgias.   Skin: Negative for rash and wound.   Allergic/Immunologic: Negative for environmental allergies.   Neurological: Negative for dizziness, syncope and numbness.   Hematological: Does not bruise/bleed easily.   Psychiatric/Behavioral: Negative for dysphoric mood and sleep disturbance. The patient is not nervous/anxious.            Objective:  Vitals:    02/07/22 1531   Weight: 86.2 kg (190 lb)   Height: 175.3 cm (69\")         02/07/22  1531   Weight: 86.2 kg (190 lb)     Body mass index is 28.06 kg/m².  General: No acute distress.  Resp: normal respiratory effort  Skin: no rashes or wounds; normal turgor  Psych: mood and affect appropriate; recent and remote memory intact          Ortho Exam     Left " Knee-    ROM 0 to 135 degrees  4+/5 on flexion  4+/5 on extension  No increased pain on resisted knee extension with knee held at 110 degrees of flexion.  Significant increased pain particularly over the anterior aspect of the patella and medial patellar facet, with resisted knee extension at 45 degrees.  Mild tenderness over the inferior pole of the patella.  Anterior incision is well healed.  No subcutaneous fluid collection noted over the patellar tendon.  No medial and lateral joint line pain.  Paige's exam- Negative  Effusion- Mild  Grade 1A Lachman  Anterior drawer- Negative  Posterior drawer- Negative  Active patellar compression test- Significantly positive    Imaging:  None  Assessment:        1. S/P left knee arthroscopy-patella tendon repair-10/22/2021    2. Patellar tendon rupture, left, initial encounter- PARTIAL    3. Chondromalacia of patellofemoral joint, left           Plan:    Large Joint Arthrocentesis: L knee  Date/Time: 2/7/2022 4:24 PM  Consent given by: patient  Site marked: site marked  Timeout: Immediately prior to procedure a time out was called to verify the correct patient, procedure, equipment, support staff and site/side marked as required   Supporting Documentation  Indications: pain   Procedure Details  Location: knee - L knee  Preparation: Patient was prepped and draped in the usual sterile fashion  Needle size: 22 G  Approach: superior  Medications administered: 80 mg triamcinolone acetonide 40 MG/ML; 8 mL lidocaine PF 1% 1 %  Patient tolerance: patient tolerated the procedure well with no immediate complications                1. Discussed treatment options at length with patient at today's visit.   2. Secondary to his significant pain with anterior knee, particularly deep flexion activities coming from his patellofemoral joint, we discussed options and wished to proceed with intra-articular injection today.  3. Patient would like to proceed with cortisone injection today to the  left knee. Recommended limited use of affected extremity for the next 24 hours to only essential activites other than work on general active and passive motion. Recommended supplementing with ice and soft tissue massage. Discussed with patient that they should see results in 5-7 days, if no improvement in 5-6 weeks I have asked them to call the office to review other options. Patient should call office immediately if they notice redness, warmth, fevers, chills, or residual numbness or tingling for greater than 6 hours after injection.   4. Recommended slowly progressing into more activities as-tolerated, more flexion and strengthening. I advised him avoid deep lunges and deep squats with a lot of weight loading of his patellofemoral joint, secondary to his chronic irritation. If he gets little to no response from the injection, we may consider repeat MRI to evaluate the status of his patellar tendon repair as well as patellofemoral joint.   5. Follow up: 4 to 6 weeks for reevaluation.      Pedro VALENZUELA [place] was in agreement with plan and had all questions answered.     Orders:  Orders Placed This Encounter   Procedures   • Large Joint Arthrocentesis: L knee       Medications:  No orders of the defined types were placed in this encounter.      Followup:  Return in about 6 weeks (around 3/21/2022).    Diagnoses and all orders for this visit:    1. S/P left knee arthroscopy-patella tendon repair-10/22/2021 (Primary)    2. Patellar tendon rupture, left, initial encounter- PARTIAL    3. Chondromalacia of patellofemoral joint, left    Other orders  -     Large Joint Arthrocentesis: L knee          Dictated utilizing Dragon dictation     Transcribed from ambient dictation for Eric Lopez MD by Dale Osuna.  02/08/22   10:31 EST    Patient verbalized consent to the visit recording.  I have personally performed the services described in this document as transcribed by the above individual, and it is both accurate  and complete.  Eric Lopez MD  2/14/2022  21:04 EST

## 2022-02-11 NOTE — PROGRESS NOTES
Physical Therapy Daily Treatment Note      Patient: Pedro Katz   : 1970  Referring practitioner: Eric Lopez MD  Date of Initial Visit: Type: THERAPY  Noted: 10/25/2021  Today's Date: 21  Patient seen for 13 sessions         Pedro Katz reports: did fine after last therapy session in regard to left knee(no increased pain, swelling or discomfort).  Could tell I used some mm's I hadn't used in awhile states pt.         Subjective     Objective   See Exercise, Manual, and Modality Logs for complete treatment.   Therapeutic exercises performed on Cable machine today consisting of:  Elliptical machine x 5 min  Passive stretch hs, calf, adductor and hip flexor/quad stretch L LE with strap 4 x 20 sec each  SLR regimen 4 ways with quad contraction L LE 2 x 10 each with 5 sec hold  Cable machine activities consisting of:  TKE L LE 25 lbs 3 x 10  TKE L LE with contralateral step up(4 in height) 25 lbs 3 x 10  Seated hs curl L LE 5lbs 3 x 10  4 way walkouts fwd/bwd 12.5 lbs x 5 each; side/side 10 lbs x 5 each    Leg press with ball squeeze 100 lbs 3 x 10  Calf raises B  lbs 3 x 10    Assessment/Plan  Mild mm soreness reported post isotonic ex/activity performance last session but without noted increased pain, swelling, discomfort left LE.  Able to perform in clinic exercise/activity regimen without increased discomfort today.  Continues to benefit from verbal/tactile cues to ensure proper exercise performance, positioning and technique especially with SLR and hold time with stretches and TKE activities.        Progress strengthening /stabilization /functional activity as appropriate/per MD protocol.           Timed:  Manual Therapy:         mins  58731;  Therapeutic Exercise:     35    mins  98643;     Neuromuscular Maribel:        mins  73838;    Therapeutic Activity:      5    mins  41491;     Gait Training:           mins  82986;     Ultrasound:          mins  24348;      Untimed:  Electrical  Stimulation:         mins  49919 ( );  Mechanical Traction:         mins  06297;     Timed Treatment:  40    mins   Total Treatment:   40     mins  Gonzalo Cash, [facility]  Physical Therapist  Assistant  [id number]

## 2022-02-14 RX ORDER — TRIAMCINOLONE ACETONIDE 40 MG/ML
80 INJECTION, SUSPENSION INTRA-ARTICULAR; INTRAMUSCULAR
Status: COMPLETED | OUTPATIENT
Start: 2022-02-07 | End: 2022-02-07

## 2022-02-14 RX ORDER — LIDOCAINE HYDROCHLORIDE 10 MG/ML
8 INJECTION, SOLUTION EPIDURAL; INFILTRATION; INTRACAUDAL; PERINEURAL
Status: COMPLETED | OUTPATIENT
Start: 2022-02-07 | End: 2022-02-07

## 2022-03-08 DIAGNOSIS — S86.812A PATELLAR TENDON RUPTURE, LEFT, INITIAL ENCOUNTER: ICD-10-CM

## 2022-03-08 DIAGNOSIS — Z98.890 S/P LEFT KNEE ARTHROSCOPY: Primary | ICD-10-CM

## 2022-03-08 DIAGNOSIS — M22.42 CHONDROMALACIA OF PATELLOFEMORAL JOINT, LEFT: ICD-10-CM

## 2022-03-16 ENCOUNTER — HOSPITAL ENCOUNTER (OUTPATIENT)
Dept: MRI IMAGING | Facility: HOSPITAL | Age: 52
Discharge: HOME | End: 2022-03-16
Admitting: ORTHOPAEDIC SURGERY

## 2022-03-16 DIAGNOSIS — S86.812A PATELLAR TENDON RUPTURE, LEFT, INITIAL ENCOUNTER: ICD-10-CM

## 2022-03-16 DIAGNOSIS — Z98.890 S/P LEFT KNEE ARTHROSCOPY: ICD-10-CM

## 2022-03-16 DIAGNOSIS — M22.42 CHONDROMALACIA OF PATELLOFEMORAL JOINT, LEFT: ICD-10-CM

## 2022-03-16 PROCEDURE — 73721 MRI JNT OF LWR EXTRE W/O DYE: CPT

## 2022-03-24 ENCOUNTER — OFFICE VISIT (OUTPATIENT)
Dept: ORTHOPEDIC SURGERY | Facility: CLINIC | Age: 52
End: 2022-03-24

## 2022-03-24 VITALS — BODY MASS INDEX: 28.14 KG/M2 | HEIGHT: 69 IN | WEIGHT: 190 LBS

## 2022-03-24 DIAGNOSIS — Z98.890 S/P LEFT KNEE ARTHROSCOPY: Primary | ICD-10-CM

## 2022-03-24 DIAGNOSIS — M22.42 CHONDROMALACIA OF PATELLOFEMORAL JOINT, LEFT: ICD-10-CM

## 2022-03-24 DIAGNOSIS — S86.812A PATELLAR TENDON RUPTURE, LEFT, INITIAL ENCOUNTER: ICD-10-CM

## 2022-03-24 PROCEDURE — 99213 OFFICE O/P EST LOW 20 MIN: CPT | Performed by: ORTHOPAEDIC SURGERY

## 2022-03-24 RX ORDER — PREDNISONE 10 MG/1
TABLET ORAL
Qty: 39 TABLET | Refills: 0 | Status: SHIPPED | OUTPATIENT
Start: 2022-03-24 | End: 2022-06-22

## 2022-03-24 NOTE — PROGRESS NOTES
Subjective:     Patient ID: Pedro Katz is a 51 y.o. male.    Chief Complaint:  Follow-up status post left knee arthroscopy with Patella chondroplasty and partial patella tendon repair, DOS 10/22/2021  Left knee intra-articular injection-cortisone-2/7/2022  History of Present Illness  Pedro Katz returns to clinic today for evaluation of left knee pain. The patient has consented to being recorded using SARI.      The patient complains of persistent pain over the anterior aspect of his left knee, particularly with deep flexion activities. He rates his pain as a 5 to 6 out of 10. He notes tenderness primarily over the distal aspect at the insertion of the patellar tendon. He denies any honorio locking or catching. He denies significant swelling. He is unsure if it is just the inflammation or fluid. He is not doing formal physical therapy, but he is doing modified exercises. He hopes to return to running 2 to 3 days per week.      Social History     Occupational History   • Not on file   Tobacco Use   • Smoking status: Never Smoker   • Smokeless tobacco: Never Used   Vaping Use   • Vaping Use: Never used   Substance and Sexual Activity   • Alcohol use: Yes     Alcohol/week: 5.0 standard drinks     Types: 5 Cans of beer per week     Comment: social    • Drug use: No   • Sexual activity: Yes     Partners: Female     Birth control/protection: None     Comment: Wife only      Past Medical History:   Diagnosis Date   • Allergic 1982   • Benign prostatic hyperplasia with urinary obstruction    • Depression    • Humerus fracture     as a child   • Hypercholesterolemia    • Mixed hyperlipidemia    • Seizure (HCC)     had a seizure in 1999 on seizure meds short term resolved off for 20yrs meds   • Skin cancer     1999   • Stenosis of carotid artery    • Visual impairment      Past Surgical History:   Procedure Laterality Date   • FASCIOTOMY Left 2002    left leg   • KNEE ARTHROSCOPY Left 10/22/2021    Procedure: KNEE  "ARTHROSCOPY,ARTHROSCOPIC PARTIAL PATTELLAR CHONDROPLASTY.;  Surgeon: Eric Lopez MD;  Location:  [facility] OR;  Service: Orthopedics;  Laterality: Left;   • PATELLA TENDON REPAIR Left 10/22/2021    Procedure: PATELLA TENDON REPAIR;  Surgeon: Eric Lopez MD;  Location:  [facility] OR;  Service: Orthopedics;  Laterality: Left;       History reviewed. No pertinent family history.      Review of Systems        Objective:  Vitals:    03/24/22 1302   Weight: 86.2 kg (190 lb)   Height: 175.3 cm (69\")         03/24/22  1302   Weight: 86.2 kg (190 lb)     Body mass index is 28.06 kg/m².  Physical Exam    Vital signs reviewed.   General: No acute distress, alert and oriented  Eyes: conjunctiva clear; pupils equally round and reactive  ENT: external ears and nose atraumatic; oropharynx clear  CV: no peripheral edema  Resp: normal respiratory effort  Skin: no rashes or wounds; normal turgor  Psych: mood and affect appropriate; recent and remote memory intact          Ortho Exam     Left Knee-    ROM 0 to 140 degrees  4+/5 on flexion  4+/5 on extension  Increased pain at the distal pole of the patella and the superior portion of the patellar tendon  Anterior incision well healed  No subcutaneous fluid collection  No medial or lateral joint line pain  Paige's exam- Negative  Effusion- Minimal    Imaging:    MRI Knee Left Without Contrast    Result Date: 3/16/2022  Postoperative change from prior left patellar tendon reconstruction. There is persistent tendon thickening and mild interstitial edema representing tendinopathy. No significant recurrent tear is present.  This report was finalized on 3/16/2022 3:42 PM by Dr. Rao Powell M.D.     Progress abnormality include review of images as well as radiology report indicates persistent tendon thickening as well as interstitial edema consistent with tendinopathy particular in the proximal patellar tendon with no evidence of recurrent tear.  Assessment:        1. S/P left " knee arthroscopy    2. Patellar tendon rupture, left, initial encounter    3. Chondromalacia of patellofemoral joint, left           Plan:    1. Discussed treatment options at length with patient at today's visit.  2. At this point in time, we will proceed with prednisone taper and a medicated topical anti-inflammatory cream from [facility] pharmacy.  3. He will follow up with me via phone or MyChart in 6 weeks to assess for his progress at that time. We also discussed option for repeat PRP injection, but he wants to hold off on that at this time.  4. All questions were answered.      Pedro Katz was in agreement with plan and had all questions answered.     Orders:  No orders of the defined types were placed in this encounter.      Medications:  New Medications Ordered This Visit   Medications   • predniSONE (DELTASONE) 10 MG tablet     Si mg po daily x 3 days, then 40 mg po daily x 3 days, then 20 mg po daily x 3 days, then 10 mg po daily x 3 days     Dispense:  39 tablet     Refill:  0       Followup:  Return if symptoms worsen or fail to improve.    Diagnoses and all orders for this visit:    1. S/P left knee arthroscopy (Primary)    2. Patellar tendon rupture, left, initial encounter    3. Chondromalacia of patellofemoral joint, left    Other orders  -     predniSONE (DELTASONE) 10 MG tablet; 60 mg po daily x 3 days, then 40 mg po daily x 3 days, then 20 mg po daily x 3 days, then 10 mg po daily x 3 days  Dispense: 39 tablet; Refill: 0          Dictated utilizing Dragon dictation   .[email or web address]  Lupe Shea

## 2022-03-25 ENCOUNTER — APPOINTMENT (OUTPATIENT)
Dept: SLEEP MEDICINE | Facility: HOSPITAL | Age: 52
End: 2022-03-25

## 2022-05-18 RX ORDER — CELECOXIB 200 MG/1
200 CAPSULE ORAL 2 TIMES DAILY
Qty: 60 CAPSULE | Refills: 0 | Status: SHIPPED | OUTPATIENT
Start: 2022-05-18 | End: 2022-06-22 | Stop reason: SDUPTHER

## 2022-06-22 RX ORDER — CELECOXIB 200 MG/1
200 CAPSULE ORAL 2 TIMES DAILY
Qty: 60 CAPSULE | Refills: 0 | Status: SHIPPED | OUTPATIENT
Start: 2022-06-22 | End: 2022-12-14

## 2022-06-22 NOTE — TELEPHONE ENCOUNTER
Rx Refill Note  Requested Prescriptions     Pending Prescriptions Disp Refills   • celecoxib (CeleBREX) 200 MG capsule 60 capsule 0     Sig: Take 1 capsule by mouth 2 (Two) Times a Day.      Last office visit with prescribing clinician: 3/24/2022      Next office visit with prescribing clinician: Visit date not found   Last Filled:05.18.2022      DX:  1. S/P left knee arthroscopy    2. Patellar tendon rupture, left, initial encounter    3. Chondromalacia of patellofemoral joint, left            Hilda Moraes MA  06/22/22, 08:44 EDT    Previous RX pended for your approval, change or denial.     {TIP  Encounters:    {TIP  Please add Last Relevant Lab Date if appropriate:  {TIP  Is Refill Pharmacy correct?:

## 2022-06-22 NOTE — TELEPHONE ENCOUNTER
----- Message from Pedro Katz sent at 6/22/2022  8:15 AM EDT -----  Regarding: Celebrex  Hi Dr. Lopez,    My knee seems to be a bit improved with Celebrex.  I have run out of the prescription.  Could I get a refill?      Thanks,    Ady

## 2022-08-31 DIAGNOSIS — G47.00 INSOMNIA, UNSPECIFIED TYPE: ICD-10-CM

## 2022-08-31 RX ORDER — TRAZODONE HYDROCHLORIDE 50 MG/1
50 TABLET ORAL NIGHTLY
Qty: 30 TABLET | Refills: 0 | Status: SHIPPED | OUTPATIENT
Start: 2022-08-31 | End: 2022-10-24

## 2022-10-23 DIAGNOSIS — G47.00 INSOMNIA, UNSPECIFIED TYPE: ICD-10-CM

## 2022-10-24 RX ORDER — TRAZODONE HYDROCHLORIDE 50 MG/1
50 TABLET ORAL NIGHTLY
Qty: 30 TABLET | Refills: 0 | Status: SHIPPED | OUTPATIENT
Start: 2022-10-24 | End: 2022-11-15

## 2022-11-12 DIAGNOSIS — E78.5 DYSLIPIDEMIA: ICD-10-CM

## 2022-11-15 DIAGNOSIS — G47.00 INSOMNIA, UNSPECIFIED TYPE: ICD-10-CM

## 2022-11-15 RX ORDER — ROSUVASTATIN CALCIUM 20 MG/1
20 TABLET, COATED ORAL DAILY
Qty: 30 TABLET | Refills: 0 | Status: SHIPPED | OUTPATIENT
Start: 2022-11-15 | End: 2022-12-14 | Stop reason: SDUPTHER

## 2022-11-15 RX ORDER — TRAZODONE HYDROCHLORIDE 50 MG/1
TABLET ORAL
Qty: 15 TABLET | Refills: 0 | Status: SHIPPED | OUTPATIENT
Start: 2022-11-15 | End: 2022-12-08

## 2022-12-07 DIAGNOSIS — Z00.00 ANNUAL PHYSICAL EXAM: ICD-10-CM

## 2022-12-07 DIAGNOSIS — E78.2 MIXED HYPERLIPIDEMIA: Primary | ICD-10-CM

## 2022-12-08 DIAGNOSIS — G47.00 INSOMNIA, UNSPECIFIED TYPE: ICD-10-CM

## 2022-12-08 RX ORDER — TRAZODONE HYDROCHLORIDE 50 MG/1
TABLET ORAL
Qty: 30 TABLET | Refills: 0 | Status: SHIPPED | OUTPATIENT
Start: 2022-12-08 | End: 2023-02-03

## 2022-12-09 ENCOUNTER — LAB (OUTPATIENT)
Dept: LAB | Facility: HOSPITAL | Age: 52
End: 2022-12-09

## 2022-12-09 LAB
ALBUMIN SERPL-MCNC: 4.6 G/DL (ref 3.5–5.2)
ALBUMIN/GLOB SERPL: 1.7 G/DL
ALP SERPL-CCNC: 55 U/L (ref 39–117)
ALT SERPL W P-5'-P-CCNC: 51 U/L (ref 1–41)
ANION GAP SERPL CALCULATED.3IONS-SCNC: 9.9 MMOL/L (ref 5–15)
AST SERPL-CCNC: 46 U/L (ref 1–40)
BASOPHILS # BLD AUTO: 0.02 10*3/MM3 (ref 0–0.2)
BASOPHILS NFR BLD AUTO: 0.4 % (ref 0–1.5)
BILIRUB SERPL-MCNC: 0.3 MG/DL (ref 0–1.2)
BUN SERPL-MCNC: 13 MG/DL (ref 6–20)
BUN/CREAT SERPL: 13.4 (ref 7–25)
CALCIUM SPEC-SCNC: 9.9 MG/DL (ref 8.6–10.5)
CHLORIDE SERPL-SCNC: 103 MMOL/L (ref 98–107)
CHOLEST SERPL-MCNC: 208 MG/DL (ref 0–200)
CO2 SERPL-SCNC: 27.1 MMOL/L (ref 22–29)
CREAT SERPL-MCNC: 0.97 MG/DL (ref 0.76–1.27)
DEPRECATED RDW RBC AUTO: 41 FL (ref 37–54)
EGFRCR SERPLBLD CKD-EPI 2021: 95.7 ML/MIN/1.73
EOSINOPHIL # BLD AUTO: 0.11 10*3/MM3 (ref 0–0.4)
EOSINOPHIL NFR BLD AUTO: 2.1 % (ref 0.3–6.2)
ERYTHROCYTE [DISTWIDTH] IN BLOOD BY AUTOMATED COUNT: 12.2 % (ref 12.3–15.4)
GLOBULIN UR ELPH-MCNC: 2.7 GM/DL
GLUCOSE SERPL-MCNC: 122 MG/DL (ref 65–99)
HCT VFR BLD AUTO: 45.1 % (ref 37.5–51)
HDLC SERPL-MCNC: 41 MG/DL (ref 40–60)
HGB BLD-MCNC: 14.6 G/DL (ref 13–17.7)
IMM GRANULOCYTES # BLD AUTO: 0.01 10*3/MM3 (ref 0–0.05)
IMM GRANULOCYTES NFR BLD AUTO: 0.2 % (ref 0–0.5)
LDLC SERPL CALC-MCNC: 118 MG/DL (ref 0–100)
LDLC/HDLC SERPL: 2.72 {RATIO}
LYMPHOCYTES # BLD AUTO: 2.22 10*3/MM3 (ref 0.7–3.1)
LYMPHOCYTES NFR BLD AUTO: 42.1 % (ref 19.6–45.3)
MCH RBC QN AUTO: 29.7 PG (ref 26.6–33)
MCHC RBC AUTO-ENTMCNC: 32.4 G/DL (ref 31.5–35.7)
MCV RBC AUTO: 91.9 FL (ref 79–97)
MONOCYTES # BLD AUTO: 0.34 10*3/MM3 (ref 0.1–0.9)
MONOCYTES NFR BLD AUTO: 6.5 % (ref 5–12)
NEUTROPHILS NFR BLD AUTO: 2.57 10*3/MM3 (ref 1.7–7)
NEUTROPHILS NFR BLD AUTO: 48.7 % (ref 42.7–76)
NRBC BLD AUTO-RTO: 0 /100 WBC (ref 0–0.2)
PLATELET # BLD AUTO: 236 10*3/MM3 (ref 140–450)
PMV BLD AUTO: 9.7 FL (ref 6–12)
POTASSIUM SERPL-SCNC: 4.5 MMOL/L (ref 3.5–5.2)
PROT SERPL-MCNC: 7.3 G/DL (ref 6–8.5)
RBC # BLD AUTO: 4.91 10*6/MM3 (ref 4.14–5.8)
SODIUM SERPL-SCNC: 140 MMOL/L (ref 136–145)
TRIGL SERPL-MCNC: 278 MG/DL (ref 0–150)
VLDLC SERPL-MCNC: 49 MG/DL (ref 5–40)
WBC NRBC COR # BLD: 5.27 10*3/MM3 (ref 3.4–10.8)

## 2022-12-09 PROCEDURE — 80053 COMPREHEN METABOLIC PANEL: CPT | Performed by: FAMILY MEDICINE

## 2022-12-09 PROCEDURE — 36415 COLL VENOUS BLD VENIPUNCTURE: CPT | Performed by: FAMILY MEDICINE

## 2022-12-09 PROCEDURE — 85025 COMPLETE CBC W/AUTO DIFF WBC: CPT | Performed by: FAMILY MEDICINE

## 2022-12-09 PROCEDURE — 80061 LIPID PANEL: CPT | Performed by: FAMILY MEDICINE

## 2022-12-14 ENCOUNTER — OFFICE VISIT (OUTPATIENT)
Dept: SPORTS MEDICINE | Facility: CLINIC | Age: 52
End: 2022-12-14

## 2022-12-14 VITALS
RESPIRATION RATE: 16 BRPM | DIASTOLIC BLOOD PRESSURE: 80 MMHG | HEART RATE: 71 BPM | OXYGEN SATURATION: 98 % | WEIGHT: 196 LBS | TEMPERATURE: 97.8 F | SYSTOLIC BLOOD PRESSURE: 128 MMHG | HEIGHT: 69 IN | BODY MASS INDEX: 29.03 KG/M2

## 2022-12-14 DIAGNOSIS — Z12.11 SCREEN FOR COLON CANCER: ICD-10-CM

## 2022-12-14 DIAGNOSIS — E78.2 MIXED HYPERLIPIDEMIA: ICD-10-CM

## 2022-12-14 DIAGNOSIS — Z23 IMMUNIZATION DUE: ICD-10-CM

## 2022-12-14 DIAGNOSIS — F34.1 DYSTHYMIA: ICD-10-CM

## 2022-12-14 DIAGNOSIS — Z00.00 ANNUAL PHYSICAL EXAM: Primary | ICD-10-CM

## 2022-12-14 PROCEDURE — 90686 IIV4 VACC NO PRSV 0.5 ML IM: CPT | Performed by: FAMILY MEDICINE

## 2022-12-14 PROCEDURE — 99396 PREV VISIT EST AGE 40-64: CPT | Performed by: FAMILY MEDICINE

## 2022-12-14 PROCEDURE — 90471 IMMUNIZATION ADMIN: CPT | Performed by: FAMILY MEDICINE

## 2022-12-14 RX ORDER — ROSUVASTATIN CALCIUM 20 MG/1
20 TABLET, COATED ORAL DAILY
Qty: 90 TABLET | Refills: 3 | Status: SHIPPED | OUTPATIENT
Start: 2022-12-14

## 2022-12-14 NOTE — PROGRESS NOTES
"Pedro Katz presents for an annual physical exam.     No acute c/o.  Tried returning to running late summer s/p L patellar tendon repair DOS 10/22/21. Pain caused him to stop. Bought treadmill, trying to ramp back up.  May consider PRP if he does not progress in the spring.  HLD: Crestor.  Admits that he can improve dietary choices to help lower his metabolic panel, cholesterol.  To consider Mediterranean diet.    I have reviewed the patient's medical, family, and social history in detail and updated the computerized patient record.    Health Maintenance   Topic Date Due   • COLORECTAL CANCER SCREENING  Never done   • ANNUAL PHYSICAL  10/29/2021   • PT PLAN OF CARE  Never done   • COVID-19 Vaccine (4 - Booster) 12/27/2021   • INFLUENZA VACCINE  08/01/2022   • LIPID PANEL  12/09/2023   • TDAP/TD VACCINES (2 - Td or Tdap) 12/01/2027   • HEPATITIS C SCREENING  Completed   • Pneumococcal Vaccine 0-64  Aged Out         /80 (BP Location: Left arm, Patient Position: Sitting, Cuff Size: Adult)   Pulse 71   Temp 97.8 °F (36.6 °C) (Temporal)   Resp 16   Ht 175.3 cm (69.02\")   Wt 88.9 kg (196 lb)   SpO2 98%   BMI 28.93 kg/m²      Physical Exam    Mask worn throughout encounter  Vital signs reviewed.  General appearance: No acute distress  Eyes: conjunctiva clear without erythema; pupils equally round and reactive  ENT: external ears and nose normal; hearing normal   Neck: supple; no thyromegaly  CV: normal rate and rhythm; no peripheral edema  Respiratory: normal respiratory effort; lungs clear to auscultation bilaterally  MSK: normal gait and station; no focal joint deformity or swelling  Skin: no rash or wounds; normal turgor  Neuro: cranial nerves 2-12 grossly intact; normal sensation to light touch  Psych: mood and affect normal; recent and remote memory intact    Orders Only on 12/07/2022   Component Date Value Ref Range Status   • Glucose 12/09/2022 122 (H)  65 - 99 mg/dL Final   • BUN 12/09/2022 13  6 - " 20 mg/dL Final   • Creatinine 12/09/2022 0.97  0.76 - 1.27 mg/dL Final   • Sodium 12/09/2022 140  136 - 145 mmol/L Final   • Potassium 12/09/2022 4.5  3.5 - 5.2 mmol/L Final   • Chloride 12/09/2022 103  98 - 107 mmol/L Final   • CO2 12/09/2022 27.1  22.0 - 29.0 mmol/L Final   • Calcium 12/09/2022 9.9  8.6 - 10.5 mg/dL Final   • Total Protein 12/09/2022 7.3  6.0 - 8.5 g/dL Final   • Albumin 12/09/2022 4.60  3.50 - 5.20 g/dL Final   • ALT (SGPT) 12/09/2022 51 (H)  1 - 41 U/L Final   • AST (SGOT) 12/09/2022 46 (H)  1 - 40 U/L Final   • Alkaline Phosphatase 12/09/2022 55  39 - 117 U/L Final   • Total Bilirubin 12/09/2022 0.3  0.0 - 1.2 mg/dL Final   • Globulin 12/09/2022 2.7  gm/dL Final   • A/G Ratio 12/09/2022 1.7  g/dL Final   • BUN/Creatinine Ratio 12/09/2022 13.4  7.0 - 25.0 Final   • Anion Gap 12/09/2022 9.9  5.0 - 15.0 mmol/L Final   • eGFR 12/09/2022 95.7  >60.0 mL/min/1.73 Final    National Kidney Foundation and American Society of Nephrology (ASN) Task Force recommended calculation based on the Chronic Kidney Disease Epidemiology Collaboration (CKD-EPI) equation refit without adjustment for race.   • Total Cholesterol 12/09/2022 208 (H)  0 - 200 mg/dL Final   • Triglycerides 12/09/2022 278 (H)  0 - 150 mg/dL Final   • HDL Cholesterol 12/09/2022 41  40 - 60 mg/dL Final   • LDL Cholesterol  12/09/2022 118 (H)  0 - 100 mg/dL Final   • VLDL Cholesterol 12/09/2022 49 (H)  5 - 40 mg/dL Final   • LDL/HDL Ratio 12/09/2022 2.72   Final   • WBC 12/09/2022 5.27  3.40 - 10.80 10*3/mm3 Final   • RBC 12/09/2022 4.91  4.14 - 5.80 10*6/mm3 Final   • Hemoglobin 12/09/2022 14.6  13.0 - 17.7 g/dL Final   • Hematocrit 12/09/2022 45.1  37.5 - 51.0 % Final   • MCV 12/09/2022 91.9  79.0 - 97.0 fL Final   • MCH 12/09/2022 29.7  26.6 - 33.0 pg Final   • MCHC 12/09/2022 32.4  31.5 - 35.7 g/dL Final   • RDW 12/09/2022 12.2 (L)  12.3 - 15.4 % Final   • RDW-SD 12/09/2022 41.0  37.0 - 54.0 fl Final   • MPV 12/09/2022 9.7  6.0 - 12.0 fL  Final   • Platelets 12/09/2022 236  140 - 450 10*3/mm3 Final   • Neutrophil % 12/09/2022 48.7  42.7 - 76.0 % Final   • Lymphocyte % 12/09/2022 42.1  19.6 - 45.3 % Final   • Monocyte % 12/09/2022 6.5  5.0 - 12.0 % Final   • Eosinophil % 12/09/2022 2.1  0.3 - 6.2 % Final   • Basophil % 12/09/2022 0.4  0.0 - 1.5 % Final   • Immature Grans % 12/09/2022 0.2  0.0 - 0.5 % Final   • Neutrophils, Absolute 12/09/2022 2.57  1.70 - 7.00 10*3/mm3 Final   • Lymphocytes, Absolute 12/09/2022 2.22  0.70 - 3.10 10*3/mm3 Final   • Monocytes, Absolute 12/09/2022 0.34  0.10 - 0.90 10*3/mm3 Final   • Eosinophils, Absolute 12/09/2022 0.11  0.00 - 0.40 10*3/mm3 Final   • Basophils, Absolute 12/09/2022 0.02  0.00 - 0.20 10*3/mm3 Final   • Immature Grans, Absolute 12/09/2022 0.01  0.00 - 0.05 10*3/mm3 Final   • nRBC 12/09/2022 0.0  0.0 - 0.2 /100 WBC Final      The 10-year ASCVD risk score (Steven JEROME, et al., 2019) is: 4.1%    Values used to calculate the score:      Age: 51 years      Sex: Male      Is Non- : No      Diabetic: No      Tobacco smoker: No      Systolic Blood Pressure: 128 mmHg      Is BP treated: No      HDL Cholesterol: 41 mg/dL      Total Cholesterol: 208 mg/dL      Diagnoses and all orders for this visit:    1. Annual physical exam (Primary)    2. Mixed hyperlipidemia  -     rosuvastatin (CRESTOR) 20 MG tablet; Take 1 tablet by mouth Daily.  Dispense: 90 tablet; Refill: 3    3. Dysthymia    4. Immunization due  -     FluLaval/Fluarix/Fluzone >6 Months    5. Screen for colon cancer  -     Ambulatory Referral For Screening Colonoscopy        Encourage healthy diet and exercise.  Encourage patient to stay up to date on screening examinations as indicated based on age and risk factors.

## 2023-01-20 DIAGNOSIS — F34.1 DYSTHYMIA: ICD-10-CM

## 2023-01-20 RX ORDER — FLUOXETINE HYDROCHLORIDE 40 MG/1
40 CAPSULE ORAL DAILY
Qty: 90 CAPSULE | Refills: 3 | Status: SHIPPED | OUTPATIENT
Start: 2023-01-20

## 2023-01-27 ENCOUNTER — PRE-PROCEDURE SCREENING (OUTPATIENT)
Dept: GASTROENTEROLOGY | Facility: CLINIC | Age: 53
End: 2023-01-27
Payer: COMMERCIAL

## 2023-01-27 NOTE — TELEPHONE ENCOUNTER
Colonoscopy screening--No personal history of polyps--No family history of polyps or colon cancer--No blood thinners--Medications:                FLUoxetine (PROzac) 40 MG capsule    rosuvastatin (CRESTOR) 20 MG tablet    traZODone (DESYREL) 50 MG tablet                QUESTIONNAIRE SCEEENING  HAS BEEN SENT TO DOCTOR FOR REVIEW

## 2023-01-29 ENCOUNTER — PREP FOR SURGERY (OUTPATIENT)
Dept: OTHER | Facility: HOSPITAL | Age: 53
End: 2023-01-29
Payer: COMMERCIAL

## 2023-01-29 DIAGNOSIS — Z12.11 ENCOUNTER FOR SCREENING FOR MALIGNANT NEOPLASM OF COLON: Primary | ICD-10-CM

## 2023-01-29 RX ORDER — SODIUM CHLORIDE, SODIUM LACTATE, POTASSIUM CHLORIDE, CALCIUM CHLORIDE 600; 310; 30; 20 MG/100ML; MG/100ML; MG/100ML; MG/100ML
30 INJECTION, SOLUTION INTRAVENOUS CONTINUOUS
Status: CANCELLED | OUTPATIENT
Start: 2023-04-24

## 2023-02-01 ENCOUNTER — TELEPHONE (OUTPATIENT)
Dept: SPORTS MEDICINE | Facility: CLINIC | Age: 53
End: 2023-02-01

## 2023-02-01 NOTE — TELEPHONE ENCOUNTER
Caller: ARSH    Relationship to patient: SELF     Best call back number: [phone]    Type of visit: LEFT KNEE PRP INJECTION

## 2023-02-03 ENCOUNTER — TELEPHONE (OUTPATIENT)
Dept: GASTROENTEROLOGY | Facility: CLINIC | Age: 53
End: 2023-02-03
Payer: COMMERCIAL

## 2023-02-03 ENCOUNTER — TELEPHONE (OUTPATIENT)
Dept: GASTROENTEROLOGY | Facility: CLINIC | Age: 53
End: 2023-02-03

## 2023-02-03 DIAGNOSIS — G47.00 INSOMNIA, UNSPECIFIED TYPE: ICD-10-CM

## 2023-02-03 RX ORDER — TRAZODONE HYDROCHLORIDE 50 MG/1
TABLET ORAL
Qty: 90 TABLET | Refills: 0 | Status: SHIPPED | OUTPATIENT
Start: 2023-02-03

## 2023-02-03 NOTE — TELEPHONE ENCOUNTER
Caller: Pedro Katz    Relationship to patient: Self    Best call back number: [phone]    Patient is needing: PATIENT MISSED  CALL AND IS REQUESTING CALL BACK.

## 2023-02-15 ENCOUNTER — TELEPHONE (OUTPATIENT)
Dept: GASTROENTEROLOGY | Facility: CLINIC | Age: 53
End: 2023-02-15
Payer: COMMERCIAL

## 2023-02-16 PROBLEM — Z12.11 ENCOUNTER FOR SCREENING FOR MALIGNANT NEOPLASM OF COLON: Status: ACTIVE | Noted: 2023-02-16

## 2023-02-16 NOTE — TELEPHONE ENCOUNTER
Hub staff attempted to follow warm transfer process and was unsuccessful     Caller: Pedro Katz    Relationship to patient: Self    Best call back number: [phone]    Patient is needing: RETURNING MISSED CALL TO SCHEDULE SCOPE  PT IS AVAILABLE TODAY AFTER 2PM            
MAREN Clark for colonoscopy on 4/24/23  arrive at  930am  . Mailed Prep instructions to Mailing address on-file. ----miralax    
Update to Obie.   
TELE/STEPDOWN

## 2023-04-21 DIAGNOSIS — F34.1 DYSTHYMIA: ICD-10-CM

## 2023-04-21 RX ORDER — FLUOXETINE HYDROCHLORIDE 40 MG/1
40 CAPSULE ORAL DAILY
Qty: 90 CAPSULE | Refills: 3 | Status: SHIPPED | OUTPATIENT
Start: 2023-04-21

## 2023-04-24 ENCOUNTER — ANESTHESIA (OUTPATIENT)
Dept: GASTROENTEROLOGY | Facility: HOSPITAL | Age: 53
End: 2023-04-24
Payer: COMMERCIAL

## 2023-04-24 ENCOUNTER — HOSPITAL ENCOUNTER (OUTPATIENT)
Facility: HOSPITAL | Age: 53
Setting detail: HOSPITAL OUTPATIENT SURGERY
Discharge: HOME | End: 2023-04-24
Attending: INTERNAL MEDICINE | Admitting: INTERNAL MEDICINE
Payer: COMMERCIAL

## 2023-04-24 ENCOUNTER — ANESTHESIA EVENT (OUTPATIENT)
Dept: GASTROENTEROLOGY | Facility: HOSPITAL | Age: 53
End: 2023-04-24
Payer: COMMERCIAL

## 2023-04-24 VITALS
BODY MASS INDEX: 27.4 KG/M2 | SYSTOLIC BLOOD PRESSURE: 100 MMHG | HEIGHT: 69 IN | DIASTOLIC BLOOD PRESSURE: 67 MMHG | OXYGEN SATURATION: 99 % | HEART RATE: 52 BPM | RESPIRATION RATE: 15 BRPM | WEIGHT: 185 LBS

## 2023-04-24 DIAGNOSIS — Z12.11 ENCOUNTER FOR SCREENING FOR MALIGNANT NEOPLASM OF COLON: ICD-10-CM

## 2023-04-24 PROCEDURE — 45385 COLONOSCOPY W/LESION REMOVAL: CPT | Performed by: INTERNAL MEDICINE

## 2023-04-24 PROCEDURE — 88305 TISSUE EXAM BY PATHOLOGIST: CPT | Performed by: INTERNAL MEDICINE

## 2023-04-24 PROCEDURE — 25010000002 PROPOFOL 10 MG/ML EMULSION: Performed by: NURSE ANESTHETIST, CERTIFIED REGISTERED

## 2023-04-24 RX ORDER — PROMETHAZINE HYDROCHLORIDE 25 MG/1
25 TABLET ORAL ONCE AS NEEDED
Status: DISCONTINUED | OUTPATIENT
Start: 2023-04-24 | End: 2023-04-24 | Stop reason: HOSPADM

## 2023-04-24 RX ORDER — IPRATROPIUM BROMIDE AND ALBUTEROL SULFATE 2.5; .5 MG/3ML; MG/3ML
3 SOLUTION RESPIRATORY (INHALATION) ONCE AS NEEDED
Status: DISCONTINUED | OUTPATIENT
Start: 2023-04-24 | End: 2023-04-24 | Stop reason: HOSPADM

## 2023-04-24 RX ORDER — LIDOCAINE HYDROCHLORIDE 20 MG/ML
INJECTION, SOLUTION INFILTRATION; PERINEURAL AS NEEDED
Status: DISCONTINUED | OUTPATIENT
Start: 2023-04-24 | End: 2023-04-24 | Stop reason: SURG

## 2023-04-24 RX ORDER — HYDROMORPHONE HCL 110MG/55ML
0.5 PATIENT CONTROLLED ANALGESIA SYRINGE INTRAVENOUS
Status: DISCONTINUED | OUTPATIENT
Start: 2023-04-24 | End: 2023-04-24 | Stop reason: HOSPADM

## 2023-04-24 RX ORDER — FLUMAZENIL 0.1 MG/ML
0.2 INJECTION INTRAVENOUS AS NEEDED
Status: DISCONTINUED | OUTPATIENT
Start: 2023-04-24 | End: 2023-04-24 | Stop reason: HOSPADM

## 2023-04-24 RX ORDER — OXYCODONE AND ACETAMINOPHEN 7.5; 325 MG/1; MG/1
1 TABLET ORAL EVERY 4 HOURS PRN
Status: DISCONTINUED | OUTPATIENT
Start: 2023-04-24 | End: 2023-04-24 | Stop reason: HOSPADM

## 2023-04-24 RX ORDER — DROPERIDOL 2.5 MG/ML
0.62 INJECTION, SOLUTION INTRAMUSCULAR; INTRAVENOUS
Status: DISCONTINUED | OUTPATIENT
Start: 2023-04-24 | End: 2023-04-24 | Stop reason: HOSPADM

## 2023-04-24 RX ORDER — PROPOFOL 10 MG/ML
VIAL (ML) INTRAVENOUS AS NEEDED
Status: DISCONTINUED | OUTPATIENT
Start: 2023-04-24 | End: 2023-04-24 | Stop reason: SURG

## 2023-04-24 RX ORDER — PROMETHAZINE HYDROCHLORIDE 25 MG/1
25 SUPPOSITORY RECTAL ONCE AS NEEDED
Status: DISCONTINUED | OUTPATIENT
Start: 2023-04-24 | End: 2023-04-24 | Stop reason: HOSPADM

## 2023-04-24 RX ORDER — SODIUM CHLORIDE, SODIUM LACTATE, POTASSIUM CHLORIDE, CALCIUM CHLORIDE 600; 310; 30; 20 MG/100ML; MG/100ML; MG/100ML; MG/100ML
1000 INJECTION, SOLUTION INTRAVENOUS CONTINUOUS
Status: DISCONTINUED | OUTPATIENT
Start: 2023-04-24 | End: 2023-04-24 | Stop reason: HOSPADM

## 2023-04-24 RX ORDER — NALOXONE HCL 0.4 MG/ML
0.2 VIAL (ML) INJECTION AS NEEDED
Status: DISCONTINUED | OUTPATIENT
Start: 2023-04-24 | End: 2023-04-24 | Stop reason: HOSPADM

## 2023-04-24 RX ORDER — SODIUM CHLORIDE 0.9 % (FLUSH) 0.9 %
10 SYRINGE (ML) INJECTION AS NEEDED
Status: DISCONTINUED | OUTPATIENT
Start: 2023-04-24 | End: 2023-04-24 | Stop reason: HOSPADM

## 2023-04-24 RX ORDER — EPHEDRINE SULFATE 50 MG/ML
5 INJECTION, SOLUTION INTRAVENOUS ONCE AS NEEDED
Status: DISCONTINUED | OUTPATIENT
Start: 2023-04-24 | End: 2023-04-24 | Stop reason: HOSPADM

## 2023-04-24 RX ORDER — DIPHENHYDRAMINE HYDROCHLORIDE 50 MG/ML
12.5 INJECTION INTRAMUSCULAR; INTRAVENOUS
Status: DISCONTINUED | OUTPATIENT
Start: 2023-04-24 | End: 2023-04-24 | Stop reason: HOSPADM

## 2023-04-24 RX ORDER — HYDRALAZINE HYDROCHLORIDE 20 MG/ML
5 INJECTION INTRAMUSCULAR; INTRAVENOUS
Status: DISCONTINUED | OUTPATIENT
Start: 2023-04-24 | End: 2023-04-24 | Stop reason: HOSPADM

## 2023-04-24 RX ORDER — PROPOFOL 10 MG/ML
VIAL (ML) INTRAVENOUS CONTINUOUS PRN
Status: DISCONTINUED | OUTPATIENT
Start: 2023-04-24 | End: 2023-04-24 | Stop reason: SURG

## 2023-04-24 RX ORDER — LABETALOL HYDROCHLORIDE 5 MG/ML
5 INJECTION, SOLUTION INTRAVENOUS
Status: DISCONTINUED | OUTPATIENT
Start: 2023-04-24 | End: 2023-04-24 | Stop reason: HOSPADM

## 2023-04-24 RX ORDER — ONDANSETRON 2 MG/ML
4 INJECTION INTRAMUSCULAR; INTRAVENOUS ONCE AS NEEDED
Status: DISCONTINUED | OUTPATIENT
Start: 2023-04-24 | End: 2023-04-24 | Stop reason: HOSPADM

## 2023-04-24 RX ORDER — SODIUM CHLORIDE, SODIUM LACTATE, POTASSIUM CHLORIDE, CALCIUM CHLORIDE 600; 310; 30; 20 MG/100ML; MG/100ML; MG/100ML; MG/100ML
INJECTION, SOLUTION INTRAVENOUS CONTINUOUS PRN
Status: DISCONTINUED | OUTPATIENT
Start: 2023-04-24 | End: 2023-04-24 | Stop reason: SURG

## 2023-04-24 RX ORDER — FENTANYL CITRATE 50 UG/ML
50 INJECTION, SOLUTION INTRAMUSCULAR; INTRAVENOUS
Status: DISCONTINUED | OUTPATIENT
Start: 2023-04-24 | End: 2023-04-24 | Stop reason: HOSPADM

## 2023-04-24 RX ORDER — HYDROCODONE BITARTRATE AND ACETAMINOPHEN 7.5; 325 MG/1; MG/1
1 TABLET ORAL ONCE AS NEEDED
Status: DISCONTINUED | OUTPATIENT
Start: 2023-04-24 | End: 2023-04-24 | Stop reason: HOSPADM

## 2023-04-24 RX ADMIN — LIDOCAINE HYDROCHLORIDE 60 MG: 20 INJECTION, SOLUTION INFILTRATION; PERINEURAL at 09:47

## 2023-04-24 RX ADMIN — PROPOFOL 100 MG: 10 INJECTION, EMULSION INTRAVENOUS at 09:47

## 2023-04-24 RX ADMIN — SODIUM CHLORIDE, POTASSIUM CHLORIDE, SODIUM LACTATE AND CALCIUM CHLORIDE: 600; 310; 30; 20 INJECTION, SOLUTION INTRAVENOUS at 09:45

## 2023-04-24 RX ADMIN — Medication 200 MCG/KG/MIN: at 09:47

## 2023-04-24 RX ADMIN — SODIUM CHLORIDE, POTASSIUM CHLORIDE, SODIUM LACTATE AND CALCIUM CHLORIDE 1000 ML: 600; 310; 30; 20 INJECTION, SOLUTION INTRAVENOUS at 09:42

## 2023-04-24 NOTE — H&P
"[facility]  Pre Procedure History & Physical    Chief Complaint:   Time for my colonoscopy    Subjective     HPI:   52 y.o. male presenting to endoscopy unit today for screening colonoscopy.    Past Medical History:   Past Medical History:   Diagnosis Date   • Allergic 1982   • Benign prostatic hyperplasia with urinary obstruction    • Depression    • Humerus fracture     as a child   • Hypercholesterolemia    • Mixed hyperlipidemia    • Seizure     had a seizure in 1999 on seizure meds short term resolved off for 20yrs meds   • Skin cancer     1999   • Stenosis of carotid artery    • Visual impairment        Family History:  History reviewed. No pertinent family history.    Social History:   reports that he has never smoked. He has never used smokeless tobacco. He reports current alcohol use of about 5.0 standard drinks per week. He reports that he does not use drugs.    Medications:   Medications Prior to Admission   Medication Sig Dispense Refill Last Dose   • FLUoxetine (PROzac) 40 MG capsule Take 1 capsule by mouth Daily. 90 capsule 3    • rosuvastatin (CRESTOR) 20 MG tablet Take 1 tablet by mouth Daily. 90 tablet 3    • traZODone (DESYREL) 50 MG tablet TAKE 1 TABLET EVERY NIGHT (NEED MD APPOINTMENT) 90 tablet 0        Allergies:  Patient has no known allergies.      Objective     Height 175.3 cm (69\"), weight 83.9 kg (185 lb).  Physical Exam:   General: patient awake, alert and cooperative    Assessment & Plan     Diagnosis:  Encounter for screening for colon cancer    Anticipated Surgical Procedure:  Colonoscopy    The risks, benefits, and alternatives of this procedure have been discussed with the patient or the responsible party- the patient understands and agrees to proceed.                                                                "

## 2023-04-24 NOTE — ANESTHESIA POSTPROCEDURE EVALUATION
"Patient: Pedro Katz    Procedure Summary     Date: 04/24/23 Room / Location: [facility] ENDOSCOPY 10 /  [facility]    Anesthesia Start: 0945 Anesthesia Stop: 1011    Procedure: COLONOSCOPY to cecum/ terminal ielum with cold snare polypectomies Diagnosis:       Encounter for screening for malignant neoplasm of colon      (Encounter for screening for malignant neoplasm of colon [Z12.11])    Surgeons: Luis Miguel Salmon MD Provider: Filippo Nixon MD    Anesthesia Type: MAC ASA Status: 2          Anesthesia Type: MAC    Vitals  Vitals Value Taken Time   BP 83/59 04/24/23 1019   Temp     Pulse 56 04/24/23 1028   Resp 14 04/24/23 1019   SpO2 94 % 04/24/23 1028   Vitals shown include unvalidated device data.        Post Anesthesia Care and Evaluation    Patient location during evaluation: bedside  Patient participation: complete - patient participated  Level of consciousness: awake and alert  Pain management: adequate    Airway patency: patent  Anesthetic complications: No anesthetic complications    Cardiovascular status: acceptable  Respiratory status: acceptable  Hydration status: acceptable    Comments: BP (!) 83/59   Pulse (!) 49   Resp 14   Ht 175.3 cm (69\")   Wt 83.9 kg (185 lb)   SpO2 95%   BMI 27.32 kg/m²       "

## 2023-04-24 NOTE — ANESTHESIA PREPROCEDURE EVALUATION
Anesthesia Evaluation     Patient summary reviewed and Nursing notes reviewed   no history of anesthetic complications:  NPO Solid Status: > 8 hours  NPO Liquid Status: > 4 hours           Airway   Mallampati: II  Dental      Pulmonary - normal exam   (+) sleep apnea,   Cardiovascular - normal exam    (+) hyperlipidemia,  carotid artery disease      Neuro/Psych  (+) seizures, psychiatric history Depression,    GI/Hepatic/Renal/Endo    (+) obesity,       Musculoskeletal     Abdominal    Substance History      OB/GYN          Other                        Anesthesia Plan    ASA 2     MAC     intravenous induction     Anesthetic plan, risks, benefits, and alternatives have been provided, discussed and informed consent has been obtained with: patient.        CODE STATUS:

## 2023-04-25 LAB
LAB AP CASE REPORT: NORMAL
PATH REPORT.FINAL DX SPEC: NORMAL
PATH REPORT.GROSS SPEC: NORMAL

## 2023-06-08 ENCOUNTER — TELEPHONE (OUTPATIENT)
Dept: GASTROENTEROLOGY | Facility: CLINIC | Age: 53
End: 2023-06-08
Payer: COMMERCIAL

## 2023-06-08 NOTE — TELEPHONE ENCOUNTER
----- Message from Luis Miguel Salmon MD sent at 5/8/2023 12:34 PM EDT -----  Adenoma and benign hyperplastic polyp  Recall 7 years

## 2023-06-08 NOTE — TELEPHONE ENCOUNTER
Colonoscopy placed in  and recall for 4/24/30.  Pt reviewed results via [employer]. Sent pt MyChart msg advising of results. Advised to call if any questions.

## 2023-06-16 ENCOUNTER — OFFICE VISIT (OUTPATIENT)
Dept: SPORTS MEDICINE | Facility: CLINIC | Age: 53
End: 2023-06-16
Payer: COMMERCIAL

## 2023-06-16 VITALS
TEMPERATURE: 98.2 F | WEIGHT: 180 LBS | SYSTOLIC BLOOD PRESSURE: 130 MMHG | BODY MASS INDEX: 26.66 KG/M2 | HEART RATE: 62 BPM | OXYGEN SATURATION: 98 % | HEIGHT: 69 IN | DIASTOLIC BLOOD PRESSURE: 70 MMHG
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
## 2023-06-16 DIAGNOSIS — S60.511A ABRASION OF PALM OF RIGHT HAND, INITIAL ENCOUNTER: ICD-10-CM

## 2023-06-16 DIAGNOSIS — M25.561 ACUTE PAIN OF RIGHT KNEE: ICD-10-CM

## 2023-06-16 DIAGNOSIS — S81.011A LACERATION OF RIGHT KNEE, INITIAL ENCOUNTER: ICD-10-CM

## 2023-06-16 NOTE — PROGRESS NOTES
"Pedro is a 52 y.o. year old male presents to [facility]    Chief Complaint   Patient presents with    Knee Pain     RIGHT KNEE- patient states that he fell 11 days ago that required stiches. He states that he would like to get further evaluation on his knee and ROM, also would like to get stitches evaluated and hopefully get them removed if possible today.       History of Present Illness  11 days ago, 6/5/2023 he was in [place] with his family.  Unfortunately this was the first day of his vacation and he fell directly onto his right knee, right hand.  He was seen in ER down there, 4 sutures were placed along the anterior right knee.  He has been treating the wrist with topical ointment and applying Band-Aid daily.  Regarding the right knee, he was having significant pain last week to the point where he went to ER here in [facility] for evaluation.  He states that over the past few days, his symptoms are much improved.  Denies fever or chills.  Denies any honorio mechanical symptoms of the knee.    ED with Rufino Weaver MD (06/11/2023)     I have reviewed the patient's medical, family, and social history in detail and updated the computerized patient record.    /70 (BP Location: Right arm, Patient Position: Sitting, Cuff Size: Adult)   Pulse 62   Temp 98.2 °F (36.8 °C) (Temporal)   Ht 175.3 cm (69.02\")   Wt 81.6 kg (180 lb)   SpO2 98%   BMI 26.57 kg/m²      Physical Exam    Vital signs reviewed.   General: No acute distress.  Eyes: conjunctiva clear; pupils equally round and reactive  ENT: external ears atraumatic  CV: no peripheral edema  Resp: normal respiratory effort, no use of accessory muscles  Skin: no rashes; normal turgor; healing eschar noted along the right palm approximately  Psych: mood and affect appropriate; recent and remote memory intact  Neuro: sensation to light touch intact    MSK Exam  R knee: Laceration along the anterior knee with 4 sutures placed.  There " "is no effusion of the knee.  He has full range of motion of the knee.  Negative patellar apprehension test.  Negative medial, negative lateral Paige.    XR Knee 3 View Right (06/11/2023 11:21)   Calcification seen within the patella tendon, chronic.        Suture Removal    Date/Time: 6/16/2023 3:10 PM  Performed by: Armen Castro Jr., DO  Authorized by: Armen Castro Jr.,    Consent: Verbal consent obtained.  Risks and benefits: risks, benefits and alternatives were discussed  Consent given by: patient  Patient understanding: patient states understanding of the procedure being performed  Patient identity confirmed: verbally with patient  Time out: Immediately prior to procedure a \"time out\" was called to verify the correct patient, procedure, equipment, support staff and site/side marked as required.  Body area: lower extremity  Location details: right knee  Wound Appearance: clean  Sutures Removed: 4  Patient tolerance: patient tolerated the procedure well with no immediate complications        Diagnoses and all orders for this visit:    Fall, initial encounter    Acute pain of right knee    Laceration of right knee, initial encounter  -     Suture Removal    Abrasion of palm of right hand, initial encounter      Sutures removed today.  Reassurance given with clinical exam.  He can continue topical treatments to the palm injury.      Follow Up   No follow-ups on file.  Patient was given instructions and counseling regarding his condition or for health maintenance advice. Please see specific information pulled into the AVS if appropriate.     EMR Dragon/Transcription disclaimer:    Much of this encounter note is an electronic transcription/translation of spoken language to printed text.  The electronic translation of spoken language may permit erroneous, or at times, nonsensical words or phrases to be inadvertently transcribed.  Although I have reviewed the note for such errors some may " still exist.

## 2023-10-18 DIAGNOSIS — E78.2 MIXED HYPERLIPIDEMIA: ICD-10-CM

## 2023-10-18 RX ORDER — ROSUVASTATIN CALCIUM 20 MG/1
20 TABLET, COATED ORAL DAILY
Qty: 90 TABLET | Refills: 10 | Status: SHIPPED | OUTPATIENT
Start: 2023-10-18

## 2023-11-29 DIAGNOSIS — G47.00 INSOMNIA, UNSPECIFIED TYPE: ICD-10-CM

## 2023-11-29 RX ORDER — TRAZODONE HYDROCHLORIDE 50 MG/1
50 TABLET ORAL NIGHTLY
Qty: 90 TABLET | Refills: 0 | Status: SHIPPED | OUTPATIENT
Start: 2023-11-29

## 2024-02-10 DIAGNOSIS — G47.00 INSOMNIA, UNSPECIFIED TYPE: ICD-10-CM

## 2024-02-13 RX ORDER — TRAZODONE HYDROCHLORIDE 50 MG/1
50 TABLET ORAL NIGHTLY
Qty: 30 TABLET | Refills: 0 | Status: SHIPPED | OUTPATIENT
Start: 2024-02-13

## 2024-03-05 DIAGNOSIS — G47.00 INSOMNIA, UNSPECIFIED TYPE: ICD-10-CM

## 2024-03-05 RX ORDER — TRAZODONE HYDROCHLORIDE 50 MG/1
TABLET ORAL
Qty: 30 TABLET | Refills: 11 | Status: SHIPPED | OUTPATIENT
Start: 2024-03-05

## 2024-03-11 DIAGNOSIS — F34.1 DYSTHYMIA: ICD-10-CM

## 2024-03-11 RX ORDER — FLUOXETINE HYDROCHLORIDE 40 MG/1
40 CAPSULE ORAL DAILY
Qty: 30 CAPSULE | Refills: 0 | Status: SHIPPED | OUTPATIENT
Start: 2024-03-11

## 2024-04-12 ENCOUNTER — OFFICE VISIT (OUTPATIENT)
Dept: SPORTS MEDICINE | Facility: CLINIC | Age: 54
End: 2024-04-12
Payer: COMMERCIAL

## 2024-04-12 VITALS
BODY MASS INDEX: 26.66 KG/M2 | HEART RATE: 58 BPM | OXYGEN SATURATION: 98 % | DIASTOLIC BLOOD PRESSURE: 80 MMHG | RESPIRATION RATE: 16 BRPM | HEIGHT: 69 IN | WEIGHT: 180 LBS | SYSTOLIC BLOOD PRESSURE: 130 MMHG

## 2024-04-12 DIAGNOSIS — M25.572 ACUTE LEFT ANKLE PAIN: ICD-10-CM

## 2024-04-12 DIAGNOSIS — M65.9 SYNOVITIS OF LEFT ANKLE: Primary | ICD-10-CM

## 2024-04-12 PROCEDURE — 99214 OFFICE O/P EST MOD 30 MIN: CPT | Performed by: FAMILY MEDICINE

## 2024-04-12 RX ORDER — METHYLPREDNISOLONE 4 MG/1
TABLET ORAL
Qty: 21 TABLET | Refills: 0 | Status: SHIPPED | OUTPATIENT
Start: 2024-04-12

## 2024-04-12 NOTE — PROGRESS NOTES
Pedro is a 53 y.o. year old male presents to [facility]    Chief Complaint   Patient presents with    Left Ankle - Pain, Initial Evaluation     New eval for LT ankle pain, DOI 04/07/2024 while running a marathon, thinks a possible sprain - numbness,tingling, and pain reported - here with new x-rays for further evaluation and treatment        History of Present Illness  History of Present Illness  The patient presents for evaluation of left ankle and lower leg pain.    The patient recounts an incident on Sunday where he participated in a 10-mile race in [facility], during which he experienced a significant strain in his left ankle and lower leg after approximately 2.5 miles. Despite the discomfort, he managed to rest, albeit with regular soreness. Two days prior, he noticed swelling and bruising in the same area, prompting him to seek medical attention. Despite the pain, he is able to ambulate, albeit with some discomfort. He successfully completed a 15K run last month in [place], currently running a 10-mile run without any issues. He typically experiences swelling and uses compression for recovery, but is uncertain if this has contributed to the swelling. He is beyond the soreness time for the race and suspects a strain, as the symptoms have been persistent for an extended period. Over the past four days, he has taken 3 to 4 tablets of over-the-counter anti-inflammatories, but his symptoms have not improved. This is his first encounter with similar symptoms. He expresses concern about potential clotting issues, given his recent travel to [place] after a 1.5-hour flight. He plans to take a few days off post-race due to his history of calf strain.    I have reviewed the patient's medical, family, and social history in detail and updated the computerized patient record.    /80 (BP Location: Right arm, Patient Position: Sitting, Cuff Size: Adult)   Pulse 58   Resp 16   Ht  "175.3 cm (69.02\")   Wt 81.6 kg (180 lb)   SpO2 98%   BMI 26.57 kg/m²      Physical Exam    Vital signs reviewed.   General: No acute distress.  Eyes: conjunctiva clear; pupils equally round and reactive  ENT: external ears atraumatic  CV: no peripheral edema  Resp: normal respiratory effort, no use of accessory muscles  Skin: no rashes or wounds; normal turgor  Psych: mood and affect appropriate; recent and remote memory intact  Neuro: sensation to light touch intact    MSK Exam  Physical Exam  In the left lower extremity, ankle, and foot, a negative Price sign is negative. There is no tenderness along the deep blood vessels of the calf. Lane's squeeze test is negative. There is no tenderness along the Achilles tendon. Anterior drawer test is negative. There is pain along the anterior plafond in the left lower extremity.    Left Ankle X-Ray  Indication: Pain  Views: AP, Lateral, Mortise    Findings:  No fracture  No bony lesion  Soft tissues normal  Nominal spurring noted of anterior tibia    No prior studies available for comparison.        Results  Imaging  X-ray of the left ankle shows no fracture or chondral defects and a anita of bone spur on the front of the ankle joint.         Diagnoses and all orders for this visit:    Synovitis of left ankle  -     methylPREDNISolone (MEDROL) 4 MG dose pack; Take as directed on package instructions.    Acute left ankle pain  -     XR Ankle 3+ View Left; Future  -     XR Ankle 3+ View Left  -     methylPREDNISolone (MEDROL) 4 MG dose pack; Take as directed on package instructions.      Assessment & Plan  1. Pain in the left ankle.  Upon examination, there is no discernible evidence of fracture or chondral defects in the ankle joint. However, there is a possibility of a minor bone spur on the anterior aspect of the ankle joint, which could be the source of the patient's symptoms. A prescription for a 6-day course of steroids has been issued. The patient has been " advised to continue applying ice to the affected area and to refrain from running. Wells score 0.    Follow-up  The patient is to schedule CPE.          Follow Up     Patient was given instructions and counseling regarding his condition or for health maintenance advice. Please see specific information pulled into the AVS if appropriate.     Patient or patient representative verbalized consent for the use of Ambient Listening during the visit with  CHEMA Castro Jr., DO for chart documentation. 4/12/2024  13:42 EDT

## 2024-04-24 ENCOUNTER — LAB (OUTPATIENT)
Dept: LAB | Facility: HOSPITAL | Age: 54
End: 2024-04-24
Payer: COMMERCIAL

## 2024-04-24 ENCOUNTER — OFFICE VISIT (OUTPATIENT)
Dept: SPORTS MEDICINE | Facility: CLINIC | Age: 54
End: 2024-04-24
Payer: COMMERCIAL

## 2024-04-24 VITALS
SYSTOLIC BLOOD PRESSURE: 134 MMHG | DIASTOLIC BLOOD PRESSURE: 88 MMHG | OXYGEN SATURATION: 97 % | HEART RATE: 66 BPM | HEIGHT: 69 IN | TEMPERATURE: 97.7 F | WEIGHT: 187 LBS | BODY MASS INDEX: 27.7 KG/M2

## 2024-04-24 DIAGNOSIS — F34.1 DYSTHYMIA: ICD-10-CM

## 2024-04-24 DIAGNOSIS — E78.2 MIXED HYPERLIPIDEMIA: ICD-10-CM

## 2024-04-24 DIAGNOSIS — Z12.5 SCREENING PSA (PROSTATE SPECIFIC ANTIGEN): ICD-10-CM

## 2024-04-24 DIAGNOSIS — Z00.00 ENCOUNTER FOR ANNUAL PHYSICAL EXAM: Primary | ICD-10-CM

## 2024-04-24 PROBLEM — S86.812A PATELLAR TENDON RUPTURE, LEFT, INITIAL ENCOUNTER: Status: RESOLVED | Noted: 2021-06-17 | Resolved: 2024-04-24

## 2024-04-24 PROBLEM — R06.83 SNORING: Status: RESOLVED | Noted: 2021-12-10 | Resolved: 2024-04-24

## 2024-04-24 PROBLEM — G47.8 NON-RESTORATIVE SLEEP: Status: RESOLVED | Noted: 2021-12-10 | Resolved: 2024-04-24

## 2024-04-24 PROBLEM — Z12.11 ENCOUNTER FOR SCREENING FOR MALIGNANT NEOPLASM OF COLON: Status: RESOLVED | Noted: 2023-02-16 | Resolved: 2024-04-24

## 2024-04-24 LAB
ALBUMIN SERPL-MCNC: 4.4 G/DL (ref 3.5–5.2)
ALBUMIN/GLOB SERPL: 1.6 G/DL
ALP SERPL-CCNC: 54 U/L (ref 39–117)
ALT SERPL W P-5'-P-CCNC: 30 U/L (ref 1–41)
ANION GAP SERPL CALCULATED.3IONS-SCNC: 8.2 MMOL/L (ref 5–15)
AST SERPL-CCNC: 30 U/L (ref 1–40)
BASOPHILS # BLD AUTO: 0.02 10*3/MM3 (ref 0–0.2)
BASOPHILS NFR BLD AUTO: 0.4 % (ref 0–1.5)
BILIRUB SERPL-MCNC: 0.4 MG/DL (ref 0–1.2)
BUN SERPL-MCNC: 11 MG/DL (ref 6–20)
BUN/CREAT SERPL: 11.8 (ref 7–25)
CALCIUM SPEC-SCNC: 9.4 MG/DL (ref 8.6–10.5)
CHLORIDE SERPL-SCNC: 105 MMOL/L (ref 98–107)
CHOLEST SERPL-MCNC: 186 MG/DL (ref 0–200)
CO2 SERPL-SCNC: 26.8 MMOL/L (ref 22–29)
CREAT SERPL-MCNC: 0.93 MG/DL (ref 0.76–1.27)
DEPRECATED RDW RBC AUTO: 43.3 FL (ref 37–54)
EGFRCR SERPLBLD CKD-EPI 2021: 99.4 ML/MIN/1.73
EOSINOPHIL # BLD AUTO: 0.07 10*3/MM3 (ref 0–0.4)
EOSINOPHIL NFR BLD AUTO: 1.4 % (ref 0.3–6.2)
ERYTHROCYTE [DISTWIDTH] IN BLOOD BY AUTOMATED COUNT: 12.6 % (ref 12.3–15.4)
GLOBULIN UR ELPH-MCNC: 2.7 GM/DL
GLUCOSE SERPL-MCNC: 105 MG/DL (ref 65–99)
HBA1C MFR BLD: 6 % (ref 4.8–5.6)
HCT VFR BLD AUTO: 45.7 % (ref 37.5–51)
HDLC SERPL-MCNC: 46 MG/DL (ref 40–60)
HGB BLD-MCNC: 14.8 G/DL (ref 13–17.7)
IMM GRANULOCYTES # BLD AUTO: 0.01 10*3/MM3 (ref 0–0.05)
IMM GRANULOCYTES NFR BLD AUTO: 0.2 % (ref 0–0.5)
LDLC SERPL CALC-MCNC: 124 MG/DL (ref 0–100)
LDLC/HDLC SERPL: 2.67 {RATIO}
LYMPHOCYTES # BLD AUTO: 2.44 10*3/MM3 (ref 0.7–3.1)
LYMPHOCYTES NFR BLD AUTO: 48.1 % (ref 19.6–45.3)
MCH RBC QN AUTO: 30.1 PG (ref 26.6–33)
MCHC RBC AUTO-ENTMCNC: 32.4 G/DL (ref 31.5–35.7)
MCV RBC AUTO: 92.9 FL (ref 79–97)
MONOCYTES # BLD AUTO: 0.37 10*3/MM3 (ref 0.1–0.9)
MONOCYTES NFR BLD AUTO: 7.3 % (ref 5–12)
NEUTROPHILS NFR BLD AUTO: 2.16 10*3/MM3 (ref 1.7–7)
NEUTROPHILS NFR BLD AUTO: 42.6 % (ref 42.7–76)
NRBC BLD AUTO-RTO: 0 /100 WBC (ref 0–0.2)
PLATELET # BLD AUTO: 238 10*3/MM3 (ref 140–450)
PMV BLD AUTO: 10.8 FL (ref 6–12)
POTASSIUM SERPL-SCNC: 4.8 MMOL/L (ref 3.5–5.2)
PROT SERPL-MCNC: 7.1 G/DL (ref 6–8.5)
PSA SERPL-MCNC: 1.34 NG/ML (ref 0–4)
RBC # BLD AUTO: 4.92 10*6/MM3 (ref 4.14–5.8)
SODIUM SERPL-SCNC: 140 MMOL/L (ref 136–145)
TRIGL SERPL-MCNC: 85 MG/DL (ref 0–150)
VLDLC SERPL-MCNC: 16 MG/DL (ref 5–40)
WBC NRBC COR # BLD AUTO: 5.07 10*3/MM3 (ref 3.4–10.8)

## 2024-04-24 PROCEDURE — 80061 LIPID PANEL: CPT | Performed by: FAMILY MEDICINE

## 2024-04-24 PROCEDURE — 85025 COMPLETE CBC W/AUTO DIFF WBC: CPT | Performed by: FAMILY MEDICINE

## 2024-04-24 PROCEDURE — 99396 PREV VISIT EST AGE 40-64: CPT | Performed by: FAMILY MEDICINE

## 2024-04-24 PROCEDURE — G0103 PSA SCREENING: HCPCS | Performed by: FAMILY MEDICINE

## 2024-04-24 PROCEDURE — 83036 HEMOGLOBIN GLYCOSYLATED A1C: CPT | Performed by: FAMILY MEDICINE

## 2024-04-24 PROCEDURE — 80053 COMPREHEN METABOLIC PANEL: CPT | Performed by: FAMILY MEDICINE

## 2024-04-24 PROCEDURE — 36415 COLL VENOUS BLD VENIPUNCTURE: CPT | Performed by: FAMILY MEDICINE

## 2024-04-24 RX ORDER — ROSUVASTATIN CALCIUM 20 MG/1
20 TABLET, COATED ORAL DAILY
Qty: 90 TABLET | Refills: 3 | Status: SHIPPED | OUTPATIENT
Start: 2024-04-24

## 2024-04-24 RX ORDER — FLUOXETINE HYDROCHLORIDE 40 MG/1
40 CAPSULE ORAL DAILY
Qty: 90 CAPSULE | Refills: 3 | Status: SHIPPED | OUTPATIENT
Start: 2024-04-24

## 2024-04-24 NOTE — PROGRESS NOTES
"Pedro Katz presents for an annual physical exam.     L ankle pain improving. Still has some swelling. Hasn't returned to running yet.  HLD, dysthymia: doing well on Rx. Refills needed.  Will complete Shingrix.    I have reviewed the patient's medical, family, and social history in detail and updated the computerized patient record.    Health Maintenance   Topic Date Due    PT PLAN OF CARE  Never done    BMI FOLLOWUP  10/22/2022    ZOSTER VACCINE (1 of 2) Never done    COVID-19 Vaccine (6 - 2023-24 season) 09/01/2023    LIPID PANEL  12/09/2023    ANNUAL PHYSICAL  12/14/2023    INFLUENZA VACCINE  08/01/2024    COLORECTAL CANCER SCREENING  04/24/2030    TDAP/TD VACCINES (3 - Td or Tdap) 06/05/2033    HEPATITIS C SCREENING  Completed    Pneumococcal Vaccine 0-64  Aged Out         /88 (BP Location: Right arm, Patient Position: Sitting, Cuff Size: Adult)   Pulse 66   Temp 97.7 °F (36.5 °C)   Ht 175.3 cm (69.02\")   Wt 84.8 kg (187 lb)   SpO2 97%   BMI 27.60 kg/m²      Physical Exam    Vital signs reviewed.  General appearance: No acute distress  Eyes: conjunctiva clear without erythema; pupils equally round and reactive  ENT: external ears and nose normal; hearing normal   Neck: supple; no thyromegaly  CV: normal rate and rhythm; no peripheral edema  Respiratory: normal respiratory effort; lungs clear to auscultation bilaterally  MSK: normal gait and station; no focal joint deformity or swelling  Skin: no rash or wounds; normal turgor  Neuro: cranial nerves 2-12 grossly intact; normal sensation to light touch  Psych: mood and affect normal; recent and remote memory intact    No visits with results within 2 Week(s) from this visit.   Latest known visit with results is:   Admission on 04/24/2023, Discharged on 04/24/2023   Component Date Value Ref Range Status    Case Report 04/24/2023    Final                    Value:Surgical Pathology Report                         Case: [id number]                         "          Authorizing Provider:  Luis Miguel Salmon MD  Collected:           04/24/2023 10:02 AM          Ordering Location:     [facility]  Received:            04/24/2023 11:23 AM                                 ENDO SUITES                                                                  Pathologist:           Sangita Saba MD                                                          Specimen:    Large Intestine, Sigmoid Colon, sigmoid colon polyp bx x3                                  Final Diagnosis 04/24/2023    Final                    Value:This result contains rich text formatting which cannot be displayed here.    Gross Description 04/24/2023    Final                    Value:This result contains rich text formatting which cannot be displayed here.        Diagnoses and all orders for this visit:    1. Encounter for annual physical exam (Primary)  -     Comprehensive metabolic panel  -     Hemoglobin A1c  -     CBC w AUTO Differential    2. Mixed hyperlipidemia  -     Comprehensive metabolic panel  -     rosuvastatin (CRESTOR) 20 MG tablet; Take 1 tablet by mouth Daily.  Dispense: 90 tablet; Refill: 3  -     Lipid Panel    3. Screening PSA (prostate specific antigen)  -     PSA SCREENING    4. Dysthymia  -     FLUoxetine (PROzac) 40 MG capsule; Take 1 capsule by mouth Daily.  Dispense: 90 capsule; Refill: 3        Encourage healthy diet and exercise.  Encourage patient to stay up to date on screening examinations as indicated based on age and risk factors.

## 2024-06-25 DIAGNOSIS — F34.1 DYSTHYMIA: ICD-10-CM

## 2024-06-25 DIAGNOSIS — G47.00 INSOMNIA, UNSPECIFIED TYPE: ICD-10-CM

## 2024-06-25 DIAGNOSIS — E78.2 MIXED HYPERLIPIDEMIA: ICD-10-CM

## 2024-06-25 RX ORDER — FLUOXETINE HYDROCHLORIDE 40 MG/1
40 CAPSULE ORAL DAILY
Qty: 90 CAPSULE | Refills: 3 | Status: SHIPPED | OUTPATIENT
Start: 2024-06-25

## 2024-06-25 RX ORDER — ROSUVASTATIN CALCIUM 20 MG/1
20 TABLET, COATED ORAL DAILY
Qty: 90 TABLET | Refills: 3 | Status: SHIPPED | OUTPATIENT
Start: 2024-06-25

## 2024-06-25 RX ORDER — TRAZODONE HYDROCHLORIDE 50 MG/1
50 TABLET ORAL NIGHTLY
Qty: 90 TABLET | Refills: 3 | Status: SHIPPED | OUTPATIENT
Start: 2024-06-25

## 2024-08-21 ENCOUNTER — HOSPITAL ENCOUNTER (OUTPATIENT)
Dept: CT IMAGING | Facility: HOSPITAL | Age: 54
Discharge: HOME | End: 2024-08-21
Admitting: FAMILY MEDICINE

## 2024-08-21 DIAGNOSIS — E78.2 MIXED HYPERLIPIDEMIA: ICD-10-CM

## 2024-08-21 PROCEDURE — 75571 CT HRT W/O DYE W/CA TEST: CPT

## 2024-09-09 ENCOUNTER — OFFICE VISIT (OUTPATIENT)
Dept: SPORTS MEDICINE | Facility: CLINIC | Age: 54
End: 2024-09-09
Payer: COMMERCIAL

## 2024-09-09 VITALS
RESPIRATION RATE: 16 BRPM | BODY MASS INDEX: 30.73 KG/M2 | SYSTOLIC BLOOD PRESSURE: 128 MMHG | DIASTOLIC BLOOD PRESSURE: 80 MMHG | WEIGHT: 180 LBS | HEART RATE: 77 BPM | HEIGHT: 64 IN | OXYGEN SATURATION: 98 %

## 2024-09-09 DIAGNOSIS — K64.4 EXTERNAL HEMORRHOID: Primary | ICD-10-CM

## 2024-09-09 PROCEDURE — 99213 OFFICE O/P EST LOW 20 MIN: CPT | Performed by: FAMILY MEDICINE

## 2024-09-09 RX ORDER — HYDROCORTISONE 25 MG/G
CREAM TOPICAL 2 TIMES DAILY
Qty: 28 G | Refills: 1 | Status: SHIPPED | OUTPATIENT
Start: 2024-09-09

## 2024-09-09 NOTE — PROGRESS NOTES
"Pedro is a 53 y.o. year old male presents to [facility]    Chief Complaint   Patient presents with    Buttock Pain     New eval for buttock pain for about 1 month - NKI, no other sxs reported        History of Present Illness  History of Present Illness  The patient is a 53-year-old male who presents for evaluation of hemorrhoids.    He has been experiencing pain in the backside for approximately a month. Initially, he thought the pain was resolving, but it has since recurred. Occasionally, he notices a small amount of blood, although not currently or frequently. He also reports a sensation of bulging when wiping, which is intermittent but has become more common recently. Despite having regular daily bowel movements, he sometimes experiences straining. He reports no abdominal pain.    Over-the-counter treatments such as Preparation H and baby diaper cream have been tried without success. His last colonoscopy was in 2023, during which nonbleeding internal hemorrhoids were identified.    He consumes two cups of coffee in the morning but does not drink sodas. His diet includes some spicy and fried foods.    I have reviewed the patient's medical, family, and social history in detail and updated the computerized patient record.    /80 (BP Location: Right arm, Patient Position: Sitting, Cuff Size: Adult)   Pulse 77   Resp 16   Ht 162.6 cm (64.02\")   Wt 81.6 kg (180 lb)   SpO2 98%   BMI 30.88 kg/m²      Physical Exam    Vital signs reviewed.   General: No acute distress.  Eyes: conjunctiva clear; pupils equally round and reactive  ENT: external ears atraumatic  CV: no peripheral edema  Resp: normal respiratory effort, no use of accessory muscles  Skin: no rashes or wounds; normal turgor  Psych: mood and affect appropriate; recent and remote memory intact  Neuro: sensation to light touch intact      Physical Exam  External hemorrhoid noted at the 11 o'clock position in the " genitourinary system, which is tender to touch. This extends to the dentate line upon internal exam and is exquisitely tender.    COLONOSCOPY (04/24/2023 09:44)       Results           Diagnoses and all orders for this visit:    External hemorrhoid  -     Hydrocortisone, Perianal, (ANUSOL-HC) 2.5 % rectal cream; Insert  into the rectum 2 (Two) Times a Day.      Assessment & Plan  1. External Hemorrhoid.  The patient has been experiencing pain and occasional bleeding for about a month, with symptoms persisting despite over-the-counter treatments like Preparation H and baby diaper cream. Examination revealed a tender external hemorrhoid at the 11 o'clock position. Colace 100 mg twice daily was recommended to soften stools, along with a minimum intake of 64 ounces of water daily. Anusol-HC was prescribed for use twice daily to help shrink the hemorrhoid. No dietary modifications are necessary at this time. If symptoms do not improve within 2-3 weeks, a referral to a specialist for potential surgical intervention will be considered.            Follow Up     Patient was given instructions and counseling regarding his condition or for health maintenance advice. Please see specific information pulled into the AVS if appropriate.     Patient or patient representative verbalized consent for the use of Ambient Listening during the visit with  CHEMA Castro Jr., DO for chart documentation. 9/9/2024  11:12 EDT

## 2024-09-21 ENCOUNTER — HOSPITAL ENCOUNTER (OUTPATIENT)
Facility: HOSPITAL | Age: 54
Discharge: HOME | End: 2024-09-23
Attending: EMERGENCY MEDICINE | Admitting: STUDENT IN AN ORGANIZED HEALTH CARE EDUCATION/TRAINING PROGRAM
Payer: COMMERCIAL

## 2024-09-21 DIAGNOSIS — G89.18 ACUTE POSTOPERATIVE PAIN: ICD-10-CM

## 2024-09-21 DIAGNOSIS — K64.9 HEMORRHOID: ICD-10-CM

## 2024-09-21 DIAGNOSIS — K64.9 HEMORRHOIDS, UNSPECIFIED HEMORRHOID TYPE: Primary | ICD-10-CM

## 2024-09-21 LAB
ALBUMIN SERPL-MCNC: 4.6 G/DL (ref 3.5–5.2)
ALBUMIN/GLOB SERPL: 1.9 G/DL
ALP SERPL-CCNC: 61 U/L (ref 39–117)
ALT SERPL W P-5'-P-CCNC: 31 U/L (ref 1–41)
ANION GAP SERPL CALCULATED.3IONS-SCNC: 11 MMOL/L (ref 5–15)
AST SERPL-CCNC: 28 U/L (ref 1–40)
BASOPHILS # BLD AUTO: 0.02 10*3/MM3 (ref 0–0.2)
BASOPHILS NFR BLD AUTO: 0.4 % (ref 0–1.5)
BILIRUB SERPL-MCNC: 0.2 MG/DL (ref 0–1.2)
BUN SERPL-MCNC: 11 MG/DL (ref 6–20)
BUN/CREAT SERPL: 14.7 (ref 7–25)
CALCIUM SPEC-SCNC: 9.2 MG/DL (ref 8.6–10.5)
CHLORIDE SERPL-SCNC: 102 MMOL/L (ref 98–107)
CO2 SERPL-SCNC: 24 MMOL/L (ref 22–29)
CREAT SERPL-MCNC: 0.75 MG/DL (ref 0.76–1.27)
DEPRECATED RDW RBC AUTO: 43.3 FL (ref 37–54)
EGFRCR SERPLBLD CKD-EPI 2021: 109.3 ML/MIN/1.73
EOSINOPHIL # BLD AUTO: 0.08 10*3/MM3 (ref 0–0.4)
EOSINOPHIL NFR BLD AUTO: 1.5 % (ref 0.3–6.2)
ERYTHROCYTE [DISTWIDTH] IN BLOOD BY AUTOMATED COUNT: 12.9 % (ref 12.3–15.4)
GLOBULIN UR ELPH-MCNC: 2.4 GM/DL
GLUCOSE SERPL-MCNC: 99 MG/DL (ref 65–99)
HCT VFR BLD AUTO: 42.6 % (ref 37.5–51)
HGB BLD-MCNC: 14.3 G/DL (ref 13–17.7)
IMM GRANULOCYTES # BLD AUTO: 0.01 10*3/MM3 (ref 0–0.05)
IMM GRANULOCYTES NFR BLD AUTO: 0.2 % (ref 0–0.5)
LYMPHOCYTES # BLD AUTO: 2.46 10*3/MM3 (ref 0.7–3.1)
LYMPHOCYTES NFR BLD AUTO: 45.3 % (ref 19.6–45.3)
MCH RBC QN AUTO: 30.6 PG (ref 26.6–33)
MCHC RBC AUTO-ENTMCNC: 33.6 G/DL (ref 31.5–35.7)
MCV RBC AUTO: 91 FL (ref 79–97)
MONOCYTES # BLD AUTO: 0.31 10*3/MM3 (ref 0.1–0.9)
MONOCYTES NFR BLD AUTO: 5.7 % (ref 5–12)
NEUTROPHILS NFR BLD AUTO: 2.55 10*3/MM3 (ref 1.7–7)
NEUTROPHILS NFR BLD AUTO: 46.9 % (ref 42.7–76)
NRBC BLD AUTO-RTO: 0 /100 WBC (ref 0–0.2)
PLATELET # BLD AUTO: 221 10*3/MM3 (ref 140–450)
PMV BLD AUTO: 10.5 FL (ref 6–12)
POTASSIUM SERPL-SCNC: 4.5 MMOL/L (ref 3.5–5.2)
PROT SERPL-MCNC: 7 G/DL (ref 6–8.5)
RBC # BLD AUTO: 4.68 10*6/MM3 (ref 4.14–5.8)
SODIUM SERPL-SCNC: 137 MMOL/L (ref 136–145)
WBC NRBC COR # BLD AUTO: 5.43 10*3/MM3 (ref 3.4–10.8)

## 2024-09-21 PROCEDURE — 99285 EMERGENCY DEPT VISIT HI MDM: CPT

## 2024-09-21 PROCEDURE — 96374 THER/PROPH/DIAG INJ IV PUSH: CPT

## 2024-09-21 PROCEDURE — 96376 TX/PRO/DX INJ SAME DRUG ADON: CPT

## 2024-09-21 PROCEDURE — 80053 COMPREHEN METABOLIC PANEL: CPT | Performed by: PHYSICIAN ASSISTANT

## 2024-09-21 PROCEDURE — 96375 TX/PRO/DX INJ NEW DRUG ADDON: CPT

## 2024-09-21 PROCEDURE — G0378 HOSPITAL OBSERVATION PER HR: HCPCS

## 2024-09-21 PROCEDURE — 99222 1ST HOSP IP/OBS MODERATE 55: CPT | Performed by: SURGERY

## 2024-09-21 PROCEDURE — 25010000002 MORPHINE PER 10 MG: Performed by: SURGERY

## 2024-09-21 PROCEDURE — 25010000002 KETOROLAC TROMETHAMINE PER 15 MG: Performed by: SURGERY

## 2024-09-21 PROCEDURE — 85025 COMPLETE CBC W/AUTO DIFF WBC: CPT | Performed by: PHYSICIAN ASSISTANT

## 2024-09-21 RX ORDER — KETOROLAC TROMETHAMINE 30 MG/ML
30 INJECTION, SOLUTION INTRAMUSCULAR; INTRAVENOUS EVERY 6 HOURS PRN
Status: DISCONTINUED | OUTPATIENT
Start: 2024-09-21 | End: 2024-09-23 | Stop reason: HOSPADM

## 2024-09-21 RX ORDER — TRAZODONE HYDROCHLORIDE 50 MG/1
50 TABLET, FILM COATED ORAL NIGHTLY
Status: DISCONTINUED | OUTPATIENT
Start: 2024-09-21 | End: 2024-09-23 | Stop reason: HOSPADM

## 2024-09-21 RX ORDER — MORPHINE SULFATE 2 MG/ML
4 INJECTION, SOLUTION INTRAMUSCULAR; INTRAVENOUS EVERY 4 HOURS PRN
Status: DISCONTINUED | OUTPATIENT
Start: 2024-09-21 | End: 2024-09-23 | Stop reason: HOSPADM

## 2024-09-21 RX ORDER — SODIUM CHLORIDE 0.9 % (FLUSH) 0.9 %
10 SYRINGE (ML) INJECTION AS NEEDED
Status: DISCONTINUED | OUTPATIENT
Start: 2024-09-21 | End: 2024-09-23 | Stop reason: HOSPADM

## 2024-09-21 RX ORDER — LIDOCAINE HYDROCHLORIDE 20 MG/ML
JELLY TOPICAL ONCE
Status: COMPLETED | OUTPATIENT
Start: 2024-09-21 | End: 2024-09-21

## 2024-09-21 RX ORDER — HYDROCORTISONE 25 MG/G
CREAM TOPICAL 2 TIMES DAILY
Status: DISCONTINUED | OUTPATIENT
Start: 2024-09-21 | End: 2024-09-23 | Stop reason: HOSPADM

## 2024-09-21 RX ADMIN — HYDROCORTISONE: 25 CREAM TOPICAL at 21:59

## 2024-09-21 RX ADMIN — MORPHINE SULFATE 4 MG: 2 INJECTION, SOLUTION INTRAMUSCULAR; INTRAVENOUS at 14:02

## 2024-09-21 RX ADMIN — LIDOCAINE HYDROCHLORIDE: 20 JELLY TOPICAL at 08:00

## 2024-09-21 RX ADMIN — TRAZODONE HYDROCHLORIDE 50 MG: 50 TABLET ORAL at 21:59

## 2024-09-21 RX ADMIN — MORPHINE SULFATE 4 MG: 2 INJECTION, SOLUTION INTRAMUSCULAR; INTRAVENOUS at 10:08

## 2024-09-21 RX ADMIN — KETOROLAC TROMETHAMINE 30 MG: 30 INJECTION, SOLUTION INTRAMUSCULAR at 16:36

## 2024-09-21 RX ADMIN — KETOROLAC TROMETHAMINE 30 MG: 30 INJECTION, SOLUTION INTRAMUSCULAR at 23:06

## 2024-09-21 RX ADMIN — MORPHINE SULFATE 4 MG: 2 INJECTION, SOLUTION INTRAMUSCULAR; INTRAVENOUS at 18:07

## 2024-09-21 RX ADMIN — HYDROCORTISONE: 25 CREAM TOPICAL at 10:59

## 2024-09-21 RX ADMIN — MORPHINE SULFATE 4 MG: 2 INJECTION, SOLUTION INTRAMUSCULAR; INTRAVENOUS at 22:02

## 2024-09-21 RX ADMIN — FLUOXETINE HYDROCHLORIDE 40 MG: 20 CAPSULE ORAL at 11:03

## 2024-09-22 ENCOUNTER — ANESTHESIA (OUTPATIENT)
Dept: ONCOLOGY | Facility: HOSPITAL | Age: 54
End: 2024-09-22

## 2024-09-22 ENCOUNTER — ANESTHESIA EVENT (OUTPATIENT)
Dept: ONCOLOGY | Facility: HOSPITAL | Age: 54
End: 2024-09-22

## 2024-09-22 PROCEDURE — 25010000002 KETOROLAC TROMETHAMINE PER 15 MG: Performed by: SURGERY

## 2024-09-22 PROCEDURE — 25010000002 MORPHINE PER 10 MG: Performed by: SURGERY

## 2024-09-22 PROCEDURE — G0378 HOSPITAL OBSERVATION PER HR: HCPCS

## 2024-09-22 PROCEDURE — 96376 TX/PRO/DX INJ SAME DRUG ADON: CPT

## 2024-09-22 PROCEDURE — 99232 SBSQ HOSP IP/OBS MODERATE 35: CPT | Performed by: SURGERY

## 2024-09-22 RX ADMIN — KETOROLAC TROMETHAMINE 30 MG: 30 INJECTION, SOLUTION INTRAMUSCULAR at 06:14

## 2024-09-22 RX ADMIN — HYDROCORTISONE: 25 CREAM TOPICAL at 21:14

## 2024-09-22 RX ADMIN — KETOROLAC TROMETHAMINE 30 MG: 30 INJECTION, SOLUTION INTRAMUSCULAR at 12:00

## 2024-09-22 RX ADMIN — FLUOXETINE HYDROCHLORIDE 40 MG: 20 CAPSULE ORAL at 08:38

## 2024-09-22 RX ADMIN — TRAZODONE HYDROCHLORIDE 50 MG: 50 TABLET ORAL at 21:14

## 2024-09-22 RX ADMIN — MORPHINE SULFATE 4 MG: 2 INJECTION, SOLUTION INTRAMUSCULAR; INTRAVENOUS at 04:46

## 2024-09-22 RX ADMIN — MORPHINE SULFATE 4 MG: 2 INJECTION, SOLUTION INTRAMUSCULAR; INTRAVENOUS at 13:02

## 2024-09-22 RX ADMIN — MORPHINE SULFATE 4 MG: 2 INJECTION, SOLUTION INTRAMUSCULAR; INTRAVENOUS at 08:39

## 2024-09-22 RX ADMIN — MORPHINE SULFATE 4 MG: 2 INJECTION, SOLUTION INTRAMUSCULAR; INTRAVENOUS at 21:14

## 2024-09-22 RX ADMIN — HYDROCORTISONE: 25 CREAM TOPICAL at 08:38

## 2024-09-22 RX ADMIN — MORPHINE SULFATE 4 MG: 2 INJECTION, SOLUTION INTRAMUSCULAR; INTRAVENOUS at 17:07

## 2024-09-22 RX ADMIN — KETOROLAC TROMETHAMINE 30 MG: 30 INJECTION, SOLUTION INTRAMUSCULAR at 17:45

## 2024-09-23 ENCOUNTER — ANESTHESIA (OUTPATIENT)
Dept: PERIOP | Facility: HOSPITAL | Age: 54
End: 2024-09-23
Payer: COMMERCIAL

## 2024-09-23 ENCOUNTER — READMISSION MANAGEMENT (OUTPATIENT)
Dept: CALL CENTER | Facility: HOSPITAL | Age: 54
End: 2024-09-23
Payer: COMMERCIAL

## 2024-09-23 ENCOUNTER — ANESTHESIA EVENT (OUTPATIENT)
Dept: PERIOP | Facility: HOSPITAL | Age: 54
End: 2024-09-23
Payer: COMMERCIAL

## 2024-09-23 VITALS
OXYGEN SATURATION: 95 % | WEIGHT: 189.38 LBS | RESPIRATION RATE: 18 BRPM | TEMPERATURE: 98.2 F | SYSTOLIC BLOOD PRESSURE: 138 MMHG | HEIGHT: 69 IN | DIASTOLIC BLOOD PRESSURE: 92 MMHG | HEART RATE: 75 BPM | BODY MASS INDEX: 28.05 KG/M2

## 2024-09-23 PROCEDURE — 25010000002 ONDANSETRON PER 1 MG: Performed by: REGISTERED NURSE

## 2024-09-23 PROCEDURE — 25010000002 CEFAZOLIN PER 500 MG: Performed by: SURGERY

## 2024-09-23 PROCEDURE — 25010000002 SUGAMMADEX 200 MG/2ML SOLUTION: Performed by: REGISTERED NURSE

## 2024-09-23 PROCEDURE — G0378 HOSPITAL OBSERVATION PER HR: HCPCS

## 2024-09-23 PROCEDURE — 25010000002 MIDAZOLAM PER 1 MG: Performed by: ANESTHESIOLOGY

## 2024-09-23 PROCEDURE — 25010000002 MORPHINE PER 10 MG: Performed by: SURGERY

## 2024-09-23 PROCEDURE — 25810000003 LACTATED RINGERS PER 1000 ML: Performed by: ANESTHESIOLOGY

## 2024-09-23 PROCEDURE — 99024 POSTOP FOLLOW-UP VISIT: CPT | Performed by: STUDENT IN AN ORGANIZED HEALTH CARE EDUCATION/TRAINING PROGRAM

## 2024-09-23 PROCEDURE — 25010000002 PROPOFOL 200 MG/20ML EMULSION: Performed by: REGISTERED NURSE

## 2024-09-23 PROCEDURE — C9290 INJ, BUPIVACAINE LIPOSOME: HCPCS | Performed by: STUDENT IN AN ORGANIZED HEALTH CARE EDUCATION/TRAINING PROGRAM

## 2024-09-23 PROCEDURE — 25010000002 KETOROLAC TROMETHAMINE PER 15 MG: Performed by: REGISTERED NURSE

## 2024-09-23 PROCEDURE — 25010000002 KETOROLAC TROMETHAMINE PER 15 MG: Performed by: SURGERY

## 2024-09-23 PROCEDURE — 25010000002 FENTANYL CITRATE (PF) 50 MCG/ML SOLUTION: Performed by: REGISTERED NURSE

## 2024-09-23 PROCEDURE — 25010000002 DEXAMETHASONE SODIUM PHOSPHATE 20 MG/5ML SOLUTION: Performed by: REGISTERED NURSE

## 2024-09-23 PROCEDURE — 88304 TISSUE EXAM BY PATHOLOGIST: CPT | Performed by: STUDENT IN AN ORGANIZED HEALTH CARE EDUCATION/TRAINING PROGRAM

## 2024-09-23 PROCEDURE — 96376 TX/PRO/DX INJ SAME DRUG ADON: CPT

## 2024-09-23 PROCEDURE — 46255 REMOVE INT/EXT HEM 1 GROUP: CPT | Performed by: STUDENT IN AN ORGANIZED HEALTH CARE EDUCATION/TRAINING PROGRAM

## 2024-09-23 PROCEDURE — 0 BUPIVACAINE LIPOSOME 1.3 % SUSPENSION 20 ML VIAL: Performed by: STUDENT IN AN ORGANIZED HEALTH CARE EDUCATION/TRAINING PROGRAM

## 2024-09-23 DEVICE — HEMOST ABS SURGIFOAM SZ100 8X12 10MM: Type: IMPLANTABLE DEVICE | Site: RECTUM | Status: FUNCTIONAL

## 2024-09-23 RX ORDER — IPRATROPIUM BROMIDE AND ALBUTEROL SULFATE 2.5; .5 MG/3ML; MG/3ML
3 SOLUTION RESPIRATORY (INHALATION) ONCE AS NEEDED
Status: DISCONTINUED | OUTPATIENT
Start: 2024-09-23 | End: 2024-09-23 | Stop reason: HOSPADM

## 2024-09-23 RX ORDER — ONDANSETRON 2 MG/ML
4 INJECTION INTRAMUSCULAR; INTRAVENOUS ONCE AS NEEDED
Status: DISCONTINUED | OUTPATIENT
Start: 2024-09-23 | End: 2024-09-23 | Stop reason: HOSPADM

## 2024-09-23 RX ORDER — KETOROLAC TROMETHAMINE 30 MG/ML
INJECTION, SOLUTION INTRAMUSCULAR; INTRAVENOUS AS NEEDED
Status: DISCONTINUED | OUTPATIENT
Start: 2024-09-23 | End: 2024-09-23 | Stop reason: SURG

## 2024-09-23 RX ORDER — ROCURONIUM BROMIDE 10 MG/ML
INJECTION, SOLUTION INTRAVENOUS AS NEEDED
Status: DISCONTINUED | OUTPATIENT
Start: 2024-09-23 | End: 2024-09-23 | Stop reason: SURG

## 2024-09-23 RX ORDER — LIDOCAINE HYDROCHLORIDE 20 MG/ML
INJECTION, SOLUTION INFILTRATION; PERINEURAL AS NEEDED
Status: DISCONTINUED | OUTPATIENT
Start: 2024-09-23 | End: 2024-09-23 | Stop reason: SURG

## 2024-09-23 RX ORDER — HYDRALAZINE HYDROCHLORIDE 20 MG/ML
5 INJECTION INTRAMUSCULAR; INTRAVENOUS
Status: DISCONTINUED | OUTPATIENT
Start: 2024-09-23 | End: 2024-09-23 | Stop reason: HOSPADM

## 2024-09-23 RX ORDER — MIDAZOLAM HYDROCHLORIDE 1 MG/ML
1 INJECTION INTRAMUSCULAR; INTRAVENOUS
Status: COMPLETED | OUTPATIENT
Start: 2024-09-23 | End: 2024-09-23

## 2024-09-23 RX ORDER — HYDROCODONE BITARTRATE AND ACETAMINOPHEN 5; 325 MG/1; MG/1
1 TABLET ORAL EVERY 6 HOURS PRN
Qty: 16 TABLET | Refills: 0 | Status: SHIPPED | OUTPATIENT
Start: 2024-09-23 | End: 2024-09-27

## 2024-09-23 RX ORDER — DIPHENHYDRAMINE HYDROCHLORIDE 50 MG/ML
12.5 INJECTION INTRAMUSCULAR; INTRAVENOUS
Status: DISCONTINUED | OUTPATIENT
Start: 2024-09-23 | End: 2024-09-23 | Stop reason: HOSPADM

## 2024-09-23 RX ORDER — METRONIDAZOLE 500 MG/100ML
500 INJECTION, SOLUTION INTRAVENOUS ONCE
Status: COMPLETED | OUTPATIENT
Start: 2024-09-23 | End: 2024-09-23

## 2024-09-23 RX ORDER — DOCUSATE SODIUM 100 MG/1
100 CAPSULE, LIQUID FILLED ORAL 2 TIMES DAILY
Qty: 28 CAPSULE | Refills: 0 | Status: SHIPPED | OUTPATIENT
Start: 2024-09-23 | End: 2024-10-07

## 2024-09-23 RX ORDER — SODIUM CHLORIDE, SODIUM LACTATE, POTASSIUM CHLORIDE, CALCIUM CHLORIDE 600; 310; 30; 20 MG/100ML; MG/100ML; MG/100ML; MG/100ML
9 INJECTION, SOLUTION INTRAVENOUS CONTINUOUS
Status: DISCONTINUED | OUTPATIENT
Start: 2024-09-23 | End: 2024-09-23 | Stop reason: HOSPADM

## 2024-09-23 RX ORDER — EPHEDRINE SULFATE 50 MG/ML
5 INJECTION, SOLUTION INTRAVENOUS ONCE AS NEEDED
Status: DISCONTINUED | OUTPATIENT
Start: 2024-09-23 | End: 2024-09-23 | Stop reason: HOSPADM

## 2024-09-23 RX ORDER — DROPERIDOL 2.5 MG/ML
0.62 INJECTION, SOLUTION INTRAMUSCULAR; INTRAVENOUS
Status: DISCONTINUED | OUTPATIENT
Start: 2024-09-23 | End: 2024-09-23 | Stop reason: HOSPADM

## 2024-09-23 RX ORDER — OXYCODONE HYDROCHLORIDE 5 MG/1
5 TABLET ORAL EVERY 4 HOURS PRN
Status: CANCELLED | OUTPATIENT
Start: 2024-09-23 | End: 2024-09-28

## 2024-09-23 RX ORDER — PROPOFOL 10 MG/ML
INJECTION, EMULSION INTRAVENOUS AS NEEDED
Status: DISCONTINUED | OUTPATIENT
Start: 2024-09-23 | End: 2024-09-23 | Stop reason: SURG

## 2024-09-23 RX ORDER — OXYCODONE AND ACETAMINOPHEN 7.5; 325 MG/1; MG/1
1 TABLET ORAL EVERY 4 HOURS PRN
Status: DISCONTINUED | OUTPATIENT
Start: 2024-09-23 | End: 2024-09-23 | Stop reason: HOSPADM

## 2024-09-23 RX ORDER — FENTANYL CITRATE 50 UG/ML
50 INJECTION, SOLUTION INTRAMUSCULAR; INTRAVENOUS ONCE AS NEEDED
Status: DISCONTINUED | OUTPATIENT
Start: 2024-09-23 | End: 2024-09-23 | Stop reason: HOSPADM

## 2024-09-23 RX ORDER — HYDROCODONE BITARTRATE AND ACETAMINOPHEN 5; 325 MG/1; MG/1
1 TABLET ORAL ONCE AS NEEDED
Status: COMPLETED | OUTPATIENT
Start: 2024-09-23 | End: 2024-09-23

## 2024-09-23 RX ORDER — EPHEDRINE SULFATE 50 MG/ML
INJECTION INTRAVENOUS AS NEEDED
Status: DISCONTINUED | OUTPATIENT
Start: 2024-09-23 | End: 2024-09-23 | Stop reason: SURG

## 2024-09-23 RX ORDER — DEXAMETHASONE SODIUM PHOSPHATE 4 MG/ML
INJECTION, SOLUTION INTRA-ARTICULAR; INTRALESIONAL; INTRAMUSCULAR; INTRAVENOUS; SOFT TISSUE AS NEEDED
Status: DISCONTINUED | OUTPATIENT
Start: 2024-09-23 | End: 2024-09-23 | Stop reason: SURG

## 2024-09-23 RX ORDER — MAGNESIUM HYDROXIDE 1200 MG/15ML
LIQUID ORAL AS NEEDED
Status: DISCONTINUED | OUTPATIENT
Start: 2024-09-23 | End: 2024-09-23 | Stop reason: HOSPADM

## 2024-09-23 RX ORDER — POLYETHYLENE GLYCOL 3350 17 G/17G
17 POWDER, FOR SOLUTION ORAL DAILY
Qty: 14 PACKET | Refills: 0 | Status: SHIPPED | OUTPATIENT
Start: 2024-09-23 | End: 2024-10-07

## 2024-09-23 RX ORDER — ONDANSETRON 4 MG/1
4 TABLET, ORALLY DISINTEGRATING ORAL EVERY 8 HOURS PRN
Qty: 9 TABLET | Refills: 0 | Status: SHIPPED | OUTPATIENT
Start: 2024-09-23 | End: 2024-09-26

## 2024-09-23 RX ORDER — LIDOCAINE HYDROCHLORIDE 10 MG/ML
0.5 INJECTION, SOLUTION INFILTRATION; PERINEURAL ONCE AS NEEDED
Status: DISCONTINUED | OUTPATIENT
Start: 2024-09-23 | End: 2024-09-23 | Stop reason: HOSPADM

## 2024-09-23 RX ORDER — PROMETHAZINE HYDROCHLORIDE 25 MG/1
25 TABLET ORAL ONCE AS NEEDED
Status: DISCONTINUED | OUTPATIENT
Start: 2024-09-23 | End: 2024-09-23 | Stop reason: HOSPADM

## 2024-09-23 RX ORDER — FLUMAZENIL 0.1 MG/ML
0.2 INJECTION INTRAVENOUS AS NEEDED
Status: DISCONTINUED | OUTPATIENT
Start: 2024-09-23 | End: 2024-09-23 | Stop reason: HOSPADM

## 2024-09-23 RX ORDER — HYDROMORPHONE HYDROCHLORIDE 1 MG/ML
0.5 INJECTION, SOLUTION INTRAMUSCULAR; INTRAVENOUS; SUBCUTANEOUS
Status: DISCONTINUED | OUTPATIENT
Start: 2024-09-23 | End: 2024-09-23 | Stop reason: HOSPADM

## 2024-09-23 RX ORDER — ONDANSETRON 2 MG/ML
INJECTION INTRAMUSCULAR; INTRAVENOUS AS NEEDED
Status: DISCONTINUED | OUTPATIENT
Start: 2024-09-23 | End: 2024-09-23 | Stop reason: SURG

## 2024-09-23 RX ORDER — PROMETHAZINE HYDROCHLORIDE 25 MG/1
25 SUPPOSITORY RECTAL ONCE AS NEEDED
Status: DISCONTINUED | OUTPATIENT
Start: 2024-09-23 | End: 2024-09-23 | Stop reason: HOSPADM

## 2024-09-23 RX ORDER — FAMOTIDINE 10 MG/ML
20 INJECTION, SOLUTION INTRAVENOUS ONCE
Status: COMPLETED | OUTPATIENT
Start: 2024-09-23 | End: 2024-09-23

## 2024-09-23 RX ORDER — FENTANYL CITRATE 50 UG/ML
50 INJECTION, SOLUTION INTRAMUSCULAR; INTRAVENOUS
Status: DISCONTINUED | OUTPATIENT
Start: 2024-09-23 | End: 2024-09-23 | Stop reason: HOSPADM

## 2024-09-23 RX ORDER — LABETALOL HYDROCHLORIDE 5 MG/ML
5 INJECTION, SOLUTION INTRAVENOUS
Status: DISCONTINUED | OUTPATIENT
Start: 2024-09-23 | End: 2024-09-23 | Stop reason: HOSPADM

## 2024-09-23 RX ORDER — HYDROCODONE BITARTRATE AND ACETAMINOPHEN 5; 325 MG/1; MG/1
1 TABLET ORAL EVERY 4 HOURS PRN
Status: CANCELLED | OUTPATIENT
Start: 2024-09-23 | End: 2024-09-28

## 2024-09-23 RX ORDER — NALOXONE HCL 0.4 MG/ML
0.2 VIAL (ML) INJECTION AS NEEDED
Status: DISCONTINUED | OUTPATIENT
Start: 2024-09-23 | End: 2024-09-23 | Stop reason: HOSPADM

## 2024-09-23 RX ORDER — SODIUM CHLORIDE 0.9 % (FLUSH) 0.9 %
3 SYRINGE (ML) INJECTION EVERY 12 HOURS SCHEDULED
Status: DISCONTINUED | OUTPATIENT
Start: 2024-09-23 | End: 2024-09-23 | Stop reason: HOSPADM

## 2024-09-23 RX ORDER — SODIUM CHLORIDE 0.9 % (FLUSH) 0.9 %
3-10 SYRINGE (ML) INJECTION AS NEEDED
Status: DISCONTINUED | OUTPATIENT
Start: 2024-09-23 | End: 2024-09-23 | Stop reason: HOSPADM

## 2024-09-23 RX ADMIN — FENTANYL CITRATE 50 MCG: 50 INJECTION, SOLUTION INTRAMUSCULAR; INTRAVENOUS at 10:39

## 2024-09-23 RX ADMIN — MIDAZOLAM 1 MG: 1 INJECTION INTRAMUSCULAR; INTRAVENOUS at 08:58

## 2024-09-23 RX ADMIN — SODIUM CHLORIDE 2 G: 900 INJECTION INTRAVENOUS at 09:07

## 2024-09-23 RX ADMIN — SUGAMMADEX 200 MG: 100 INJECTION, SOLUTION INTRAVENOUS at 10:01

## 2024-09-23 RX ADMIN — HYDROCODONE BITARTRATE AND ACETAMINOPHEN 1 TABLET: 5; 325 TABLET ORAL at 10:45

## 2024-09-23 RX ADMIN — MORPHINE SULFATE 4 MG: 2 INJECTION, SOLUTION INTRAMUSCULAR; INTRAVENOUS at 02:15

## 2024-09-23 RX ADMIN — EPHEDRINE SULFATE 10 MG: 50 INJECTION INTRAVENOUS at 09:34

## 2024-09-23 RX ADMIN — DEXAMETHASONE SODIUM PHOSPHATE 8 MG: 4 INJECTION, SOLUTION INTRAMUSCULAR; INTRAVENOUS at 09:16

## 2024-09-23 RX ADMIN — MIDAZOLAM 1 MG: 1 INJECTION INTRAMUSCULAR; INTRAVENOUS at 08:34

## 2024-09-23 RX ADMIN — LIDOCAINE HYDROCHLORIDE 100 MG: 20 INJECTION, SOLUTION INFILTRATION; PERINEURAL at 09:14

## 2024-09-23 RX ADMIN — SODIUM CHLORIDE, POTASSIUM CHLORIDE, SODIUM LACTATE AND CALCIUM CHLORIDE 9 ML/HR: 600; 310; 30; 20 INJECTION, SOLUTION INTRAVENOUS at 08:34

## 2024-09-23 RX ADMIN — PROPOFOL 200 MG: 10 INJECTION, EMULSION INTRAVENOUS at 09:14

## 2024-09-23 RX ADMIN — METRONIDAZOLE 500 MG: 500 INJECTION, SOLUTION INTRAVENOUS at 09:07

## 2024-09-23 RX ADMIN — KETOROLAC TROMETHAMINE 30 MG: 30 INJECTION, SOLUTION INTRAMUSCULAR at 03:26

## 2024-09-23 RX ADMIN — ONDANSETRON 4 MG: 2 INJECTION INTRAMUSCULAR; INTRAVENOUS at 09:16

## 2024-09-23 RX ADMIN — FAMOTIDINE 20 MG: 10 INJECTION INTRAVENOUS at 08:34

## 2024-09-23 RX ADMIN — FENTANYL CITRATE 50 MCG: 50 INJECTION, SOLUTION INTRAMUSCULAR; INTRAVENOUS at 11:24

## 2024-09-23 RX ADMIN — KETOROLAC TROMETHAMINE 30 MG: 30 INJECTION, SOLUTION INTRAMUSCULAR at 09:55

## 2024-09-23 RX ADMIN — ROCURONIUM BROMIDE 50 MG: 10 INJECTION, SOLUTION INTRAVENOUS at 09:15

## 2024-09-24 ENCOUNTER — TRANSITIONAL CARE MANAGEMENT TELEPHONE ENCOUNTER (OUTPATIENT)
Dept: CALL CENTER | Facility: HOSPITAL | Age: 54
End: 2024-09-24
Payer: COMMERCIAL

## 2024-09-24 LAB
CYTO UR: NORMAL
LAB AP CASE REPORT: NORMAL
PATH REPORT.FINAL DX SPEC: NORMAL
PATH REPORT.GROSS SPEC: NORMAL

## 2024-09-25 ENCOUNTER — TRANSITIONAL CARE MANAGEMENT TELEPHONE ENCOUNTER (OUTPATIENT)
Dept: CALL CENTER | Facility: HOSPITAL | Age: 54
End: 2024-09-25
Payer: COMMERCIAL

## 2024-10-09 ENCOUNTER — OFFICE VISIT (OUTPATIENT)
Dept: SPORTS MEDICINE | Facility: CLINIC | Age: 54
End: 2024-10-09
Payer: COMMERCIAL

## 2024-10-09 VITALS
DIASTOLIC BLOOD PRESSURE: 80 MMHG | BODY MASS INDEX: 27.99 KG/M2 | HEART RATE: 65 BPM | HEIGHT: 69 IN | SYSTOLIC BLOOD PRESSURE: 128 MMHG | OXYGEN SATURATION: 98 % | WEIGHT: 189 LBS | RESPIRATION RATE: 16 BRPM

## 2024-10-09 DIAGNOSIS — Z87.19 STATUS POST HEMORRHOIDECTOMY: ICD-10-CM

## 2024-10-09 DIAGNOSIS — Z98.890 STATUS POST HEMORRHOIDECTOMY: ICD-10-CM

## 2024-10-09 DIAGNOSIS — Z09 HOSPITAL DISCHARGE FOLLOW-UP: Primary | ICD-10-CM

## 2024-10-09 PROCEDURE — 99214 OFFICE O/P EST MOD 30 MIN: CPT | Performed by: FAMILY MEDICINE

## 2024-10-09 NOTE — PROGRESS NOTES
"Pedro is a 53 y.o. year old male presents to [facility]    Chief Complaint   Patient presents with    [facility] f/u 09/21/2024-09/23/2024 for external hemorrhoids - here for further evaluation and treatment        History of Present Illness  History of Present Illness  The patient presents for a follow-up visit.    He reports feeling better than during his last visit. He experienced severe pain on a Saturday morning, which led to an ER visit and subsequent admission. The pain was managed, and surgery was scheduled for Sunday, but due to unforeseen circumstances, it was postponed to Monday. The surgery took place two weeks ago, and he has been recovering since then. He still experiences some soreness and has a follow-up appointment with the surgeon tomorrow. He reports no other issues.    Prior to surgery, he was prescribed morphine and Toradol for pain management, which were effective in controlling the pain. He did not require any medication for nausea. He took pain medication for the first three days post-surgery and then discontinued it as the pain became more manageable. He is aware that pain medications can cause constipation and has been taking stool softeners to counteract this. He noticed an immediate improvement in pain and pressure following the surgery.    He declined the influenza vaccine at this time.    Discharge Summary by Jodie Carey MD (09/23/2024 10:21)     I have reviewed the patient's medical, family, and social history in detail and updated the computerized patient record.    /80 (BP Location: Right arm, Patient Position: Sitting, Cuff Size: Adult)   Pulse 65   Resp 16   Ht 175 cm (68.9\")   Wt 85.7 kg (189 lb)   SpO2 98%   BMI 27.99 kg/m²      Physical Exam    Vital signs reviewed.   General: No acute distress.  Eyes: conjunctiva clear; pupils equally round and reactive  ENT: external ears atraumatic  CV: no peripheral " edema  Resp: normal respiratory effort, no use of accessory muscles  Skin: no rashes or wounds; normal turgor  Psych: mood and affect appropriate; recent and remote memory intact  Neuro: sensation to light touch intact      Physical Exam        Tissue Pathology Exam (09/23/2024 09:47)   Results           Diagnoses and all orders for this visit:    Hospital discharge follow-up    Status post hemorrhoidectomy      Assessment & Plan  1. Postoperative follow-up.  The patient underwent surgery for a left lateral hemorrhoid two weeks ago. He reports significant improvement in pain and pressure post-surgery, with only some residual soreness. He took pain medications for the first three days post-surgery and continues to use stool softeners. He did not require nausea medication. The pathology report confirmed the hemorrhoid, and no alarming findings were noted in the blood tests. He has a follow-up appointment with the surgeon scheduled for tomorrow.    2. Health Maintenance.  He was advised to receive the influenza vaccine by the end of the month.        I spent 30 minutes caring for Pedro on this date of service. This time includes time spent by me in the following activities:preparing for the visit, reviewing tests, obtaining and/or reviewing a separately obtained history, performing a medically appropriate examination and/or evaluation , counseling and educating the patient/family/caregiver, documenting information in the medical record, and independently interpreting results and communicating that information with the patient/family/caregiver    Follow Up     Patient was given instructions and counseling regarding his condition or for health maintenance advice. Please see specific information pulled into the AVS if appropriate.     Patient or patient representative verbalized consent for the use of Ambient Listening during the visit with  CHEMA Castro Jr., DO for chart documentation. 10/9/2024  14:37 EDT

## 2024-10-10 ENCOUNTER — OFFICE VISIT (OUTPATIENT)
Dept: SURGERY | Facility: CLINIC | Age: 54
End: 2024-10-10
Payer: COMMERCIAL

## 2024-10-10 VITALS
BODY MASS INDEX: 27.4 KG/M2 | TEMPERATURE: 98.3 F | WEIGHT: 185 LBS | HEIGHT: 69 IN | HEART RATE: 66 BPM | SYSTOLIC BLOOD PRESSURE: 122 MMHG | DIASTOLIC BLOOD PRESSURE: 76 MMHG | OXYGEN SATURATION: 99 %

## 2024-10-10 DIAGNOSIS — Z87.19 S/P HEMORRHOIDECTOMY: Primary | ICD-10-CM

## 2024-10-10 DIAGNOSIS — Z98.890 S/P HEMORRHOIDECTOMY: Primary | ICD-10-CM

## 2024-10-10 NOTE — PROGRESS NOTES
General Surgery Office Follow Up Note     History of Present Illness:    Pedro Katz is a 53 y.o. male who presents for follow up from excisional hemorrhoidectomy performed on 9/23/2024 for thrombosed external and prolapsed internal left lateral hemorrhoid.  Pathology was consistent with hemorrhoid.    Interval history:  Patient has been doing well postoperatively.  He took pain medications for a few days after surgery but his anal pain has been improving and is tolerable now off narcotics.  He states he had intermittent small-volume bleeding that stopped yesterday.  He has not had any fever or chills.  He has not had diarrhea.  He has had some mild constipation with firm stools for which he is taking Colace nightly and which is helping.  He has been tolerating regular diet.  He denies any abdominal pain or other symptoms.  He states he is getting back to normal activities like working out.      Past medical history:  Hyperlipidemia  Hemorrhoids    Past surgical history:  Hemorrhoidectomy 9/23/2024 by me for prolapsed internal and thrombosed external hemorrhoid  Colonoscopy, last in April 2023 with benign polyps and noninflamed internal hemorrhoids  Left knee surgery    Family history:  Negative for colon or rectal cancer    Social history:  No tobacco or recreational drug use  Drinks alcohol socially      Allergies:  No Known Allergies    Meds:    Current Outpatient Medications:     albuterol sulfate  (90 Base) MCG/ACT inhaler, Inhale 2 puffs Every 6 (Six) Hours As Needed (Cough)., Disp: 1 g, Rfl: 0    FLUoxetine (PROzac) 40 MG capsule, Take 1 capsule by mouth Daily., Disp: 90 capsule, Rfl: 3    Hydrocortisone, Perianal, (ANUSOL-HC) 2.5 % rectal cream, Insert  into the rectum 2 (Two) Times a Day., Disp: 28 g, Rfl: 1    rosuvastatin (CRESTOR) 20 MG tablet, Take 1 tablet by mouth Daily., Disp: 90 tablet, Rfl: 3    traZODone (DESYREL) 50 MG tablet, Take 1 tablet by mouth Every Night., Disp: 90  "tablet, Rfl: 3    Physical Exam:   /76 (BP Location: Right arm, Patient Position: Sitting)   Pulse 66   Temp 98.3 °F (36.8 °C)   Ht 175 cm (68.9\")   Wt 83.9 kg (185 lb)   SpO2 99%   BMI 27.40 kg/m²   Constitutional: Well-developed well-nourished   Respiratory: Normal inspiratory effort on room air  Cardiovascular: Well perfused, no peripheral edema   Gastrointestinal: Abd nondistended  External perianal examination demonstrates healing left lateral hemorrhoidectomy wound without any signs of infection or drainage; there no other external hemorrhoids, prolapse, or evidence of fistula or fissure noted  Musculoskeletal: Symmetric strength, normal gait  Psychiatric: Normal mood and affect  Skin:  Warm, dry, no rash on visualized skin surfaces  BMI is >= 25 and <30. (Overweight) The following options were offered after discussion;: information on healthy weight added to patient's after visit summary       Assessment/Plan:  Thrombosed external hemorrhoid  Prolapsed internal hemorrhoid  Status post excisional hemorrhoidectomy  Constipation    Patient doing well postoperatively.  Wound is healing as expected.  Discussed recommendations to prevent constipation and recurrent hemorrhoids: Eat high fiber diet including 30 grams of fiber daily. Add benefiber or metamucil if unable to eat 30 grams of fruit/vegetable fiber daily. Drink plenty of water. Continue Colace and MiraLAX as needed to achieve 1-2 soft bowel movements per day. Avoid straining.   Continue pain control with sitz baths, NSAIDs.   Ok to return to activity as tolerated.  Follow up with me as needed for worsening constipation, pain, bleeding, persistent drainage, fever/chills, or any other concerns.  Patient voiced understanding and is agreeable with the recommendations.    Jodie Carey M.D.  General, Robotic and Endoscopic Surgery  [facility]    [address], [address]  [place], [place], [address]  P: [phone]  F: " [phone]

## 2024-10-10 NOTE — PATIENT INSTRUCTIONS
High-Fiber Diet  Fiber, also called dietary fiber, is a type of carbohydrate found in fruits, vegetables, whole grains, and beans. A high-fiber diet can have many health benefits. Your health care provider may recommend a high-fiber diet to help:  Prevent constipation. Fiber can make your bowel movements more regular.  Lower your cholesterol.  Relieve hemorrhoids, uncomplicated diverticulosis, or irritable bowel syndrome.  Prevent overeating as part of a weight-loss plan.  Prevent heart disease, type 2 diabetes, and certain cancers.    WHAT IS MY PLAN?  The recommended daily intake of fiber includes:  38 grams for men under age 50.  30 grams for men over age 50.  25 grams for women under age 50.  21 grams for women over age 50.  You can get the recommended daily intake of dietary fiber by eating a variety of fruits, vegetables, grains, and beans. Your health care provider may also recommend a fiber supplement if it is not possible to get enough fiber through your diet.    WHAT DO I NEED TO KNOW ABOUT A HIGH-FIBER DIET?  Fiber supplements have not been widely studied for their effectiveness, so it is better to get fiber through food sources.  Always check the fiber content on the nutrition facts label of any prepackaged food. Look for foods that contain at least 5 grams of fiber per serving.  Ask your dietitian if you have questions about specific foods that are related to your condition, especially if those foods are not listed in the following section.  Increase your daily fiber consumption gradually. Increasing your intake of dietary fiber too quickly may cause bloating, cramping, or gas.  Drink plenty of water. Water helps you to digest fiber.    WHAT FOODS CAN I EAT?  Grains  Whole-grain breads. Multigrain cereal. Oats and oatmeal. Brown rice. Barley. Bulgur wheat. Millet. Bran muffins. Popcorn. Rye wafer crackers.  Vegetables  Sweet potatoes. Spinach. Kale. Artichokes. Cabbage. Broccoli. Green peas. Carrots.  Squash.  Fruits  Berries. Pears. Apples. Oranges. Avocados. Prunes and raisins. Dried figs.  Meats and Other Protein Sources  Navy, kidney, dean, and soy beans. Split peas. Lentils. Nuts and seeds.  Dairy  Fiber-fortified yogurt.  Beverages  Fiber-fortified soy milk. Fiber-fortified orange juice.  Other  Fiber bars.  The items listed above may not be a complete list of recommended foods or beverages. Contact your dietitian for more options.  WHAT FOODS ARE NOT RECOMMENDED?  Grains  White bread. Pasta made with refined flour. White rice.  Vegetables  Fried potatoes. Canned vegetables. Well-cooked vegetables.   Fruits  Fruit juice. Cooked, strained fruit.  Meats and Other Protein Sources  Fatty cuts of meat. Fried poultry or fried fish.  Dairy  Milk. Yogurt. Cream cheese. Sour cream.  Beverages  Soft drinks.  Other  Cakes and pastries. Butter and oils.  The items listed above may not be a complete list of foods and beverages to avoid. Contact your dietitian for more information.    WHAT ARE SOME TIPS FOR INCLUDING HIGH-FIBER FOODS IN MY DIET?  Eat a wide variety of high-fiber foods.  Make sure that half of all grains consumed each day are whole grains.  Replace breads and cereals made from refined flour or white flour with whole-grain breads and cereals.  Replace white rice with brown rice, bulgur wheat, or millet.  Start the day with a breakfast that is high in fiber, such as a cereal that contains at least 5 grams of fiber per serving.  Use beans in place of meat in soups, salads, or pasta.  Eat high-fiber snacks, such as berries, raw vegetables, nuts, or popcorn.

## 2025-01-17 ENCOUNTER — TELEPHONE (OUTPATIENT)
Dept: SPORTS MEDICINE | Facility: CLINIC | Age: 55
End: 2025-01-17

## 2025-01-17 NOTE — TELEPHONE ENCOUNTER
"    Caller: Pedro Katz \"Ady\"    Relationship to patient: Self    Best call back number: [phone]    Chief complaint: RIGHT CALF PAIN    Type of visit: NEW PROBLEM, NO IMAGING OR KNOWN SX     Requested date: 01/21 OR 01/23     Additional notes:PATIENT ASKING TO BE WORKED IN NEXT WEEK, HUB UNABLE TO SCHEDULE UNTIL 01/28/25       "

## 2025-01-23 ENCOUNTER — OFFICE VISIT (OUTPATIENT)
Dept: SPORTS MEDICINE | Facility: CLINIC | Age: 55
End: 2025-01-23
Payer: COMMERCIAL

## 2025-01-23 VITALS
SYSTOLIC BLOOD PRESSURE: 126 MMHG | WEIGHT: 185 LBS | HEIGHT: 69 IN | RESPIRATION RATE: 16 BRPM | HEART RATE: 63 BPM | DIASTOLIC BLOOD PRESSURE: 70 MMHG | OXYGEN SATURATION: 98 % | BODY MASS INDEX: 27.4 KG/M2

## 2025-01-23 DIAGNOSIS — S86.811A STRAIN OF CALF MUSCLE, RIGHT, INITIAL ENCOUNTER: Primary | ICD-10-CM

## 2025-01-23 PROCEDURE — 99213 OFFICE O/P EST LOW 20 MIN: CPT | Performed by: FAMILY MEDICINE

## 2025-01-23 NOTE — PROGRESS NOTES
"Pedro is a 54 y.o. year old male presents to [facility]    Chief Complaint   Patient presents with    Leg Pain     New eval for right lower leg /calf pain - reports running injury about 3 weeks, pain and muscle pain in the calf and achilles - here for further evaluation and treatment        History of Present Illness  History of Present Illness  The patient presents for evaluation of right calf pain.    He reports a chronic issue with his right calf, which he believes to be a pulled muscle. Despite taking a 2-week break from running and engaging in extensive stretching and massage, the problem persists. He experiences a seizing sensation in his calf approximately 5 minutes into a run. This acute phase has been ongoing for the past 3 weeks. He is unable to run but can use a zero runner without exacerbating the condition. The pain is primarily located on the inside of his calf, with occasional discomfort at the back. He has attempted to manage the symptoms with a calf sleeve, icing, and elevation, but these measures have not been effective. He expresses skepticism about physical therapy due to previous unsuccessful attempts but is open to trying dry needling first. He is currently training for a 15K race scheduled for 03/02/2024 and is concerned about his inability to run. He maintains his fitness through other activities and uses a foam roller and Theragun daily. He recalls similar chronic calf injuries years ago, which had resolved until recently. He has undergone dry needling in the past but does not recall the specific focus of the treatment.    Supplemental Information  He has had left knee surgery.    I have reviewed the patient's medical, family, and social history in detail and updated the computerized patient record.    /70 (BP Location: Right arm, Patient Position: Sitting, Cuff Size: Adult)   Pulse 63   Resp 16   Ht 175 cm (68.9\")   Wt 83.9 kg (185 lb)   SpO2 " 98%   BMI 27.40 kg/m²      Physical Exam    Vital signs reviewed.   General: No acute distress.  Eyes: conjunctiva clear; pupils equally round and reactive  ENT: external ears atraumatic  CV: no peripheral edema  Resp: normal respiratory effort, no use of accessory muscles  Skin: no rashes or wounds; normal turgor  Psych: mood and affect appropriate; recent and remote memory intact  Neuro: sensation to light touch intact    MSK Exam  Physical Exam  In the right lower extremity, there is a negative Price sign. No swelling, erythema, or warmth is observed. Tenderness is present along the medial gastrocnemius soleus complex. No tenderness is found in the proximal Achilles. The Lane squeeze test is negative. No bony tenderness is detected along the tibia.    Vital Signs  Vital signs are normal.          Results           Diagnoses and all orders for this visit:    Strain of calf muscle, right, initial encounter  -     Ambulatory Referral to Physical Therapy for Evaluation & Treatment      Assessment & Plan  1. Pain in the right calf.  The symptoms do not align with those typically associated with a blood clot or stress fracture. The possibility of tendinosis or partial tears was discussed. He was advised to continue using the compression sleeve and to apply ice as needed. Over-the-counter medications such as ibuprofen or Aleve were recommended for post-exercise discomfort. A referral will be made to Lisa, a physical therapist specializing in athletic injuries and dry needling, for further evaluation and treatment.          Follow Up     Patient was given instructions and counseling regarding his condition or for health maintenance advice. Please see specific information pulled into the AVS if appropriate.     Patient or patient representative verbalized consent for the use of Ambient Listening during the visit with  CHEMA Castro Jr., DO for chart documentation. 1/23/2025  10:47 EST

## 2025-02-05 ENCOUNTER — TREATMENT (OUTPATIENT)
Dept: PHYSICAL THERAPY | Facility: CLINIC | Age: 55
End: 2025-02-05
Payer: COMMERCIAL

## 2025-02-05 DIAGNOSIS — M79.661 RIGHT CALF PAIN: Primary | ICD-10-CM

## 2025-02-05 DIAGNOSIS — R53.1 WEAKNESS GENERALIZED: ICD-10-CM

## 2025-02-05 PROCEDURE — 97112 NEUROMUSCULAR REEDUCATION: CPT | Performed by: PHYSICAL THERAPIST

## 2025-02-05 PROCEDURE — 97161 PT EVAL LOW COMPLEX 20 MIN: CPT | Performed by: PHYSICAL THERAPIST

## 2025-02-05 PROCEDURE — 97110 THERAPEUTIC EXERCISES: CPT | Performed by: PHYSICAL THERAPIST

## 2025-02-05 NOTE — PROGRESS NOTES
Physical Therapy Initial Evaluation and Plan of Care      Patient: Pedro Katz   : 1970  Diagnosis/ICD-10 Code:  Right calf pain [M79.661]  Referring practitioner: Armen Castro *  Date of Initial Visit: 2025  Today's Date: 2025   Patient seen for 1 session  Location of Service: [address] - [address] [place] [address]       Visit Diagnoses:    ICD-10-CM ICD-9-CM   1. Right calf pain  M79.661 729.5   2. Weakness generalized  R53.1 780.79       Past Surgical History:   Procedure Laterality Date   • COLONOSCOPY N/A 2023    Procedure: COLONOSCOPY to cecum/ terminal ielum with cold snare polypectomies;  Surgeon: Luis Miguel Salmon MD;  Location: [facility] ENDOSCOPY;  Service: Gastroenterology;  Laterality: N/A;  pre- screening   post- polyps, diverticulsis, hemorrhoids    • FASCIOTOMY Left     left leg   • HEMORRHOIDECTOMY N/A 2024    Procedure: HEMORRHOIDECTOMY;  Surgeon: Jodie Carey MD;  Location: [facility] MAIN OR;  Service: General;  Laterality: N/A;   • KNEE ARTHROSCOPY Left 10/22/2021    Procedure: KNEE ARTHROSCOPY,ARTHROSCOPIC PARTIAL PATTELLAR CHONDROPLASTY.;  Surgeon: Eric Lopez MD;  Location: [facility] OR;  Service: Orthopedics;  Laterality: Left;   • KNEE SURGERY     • PATELLA TENDON REPAIR Left 10/22/2021    Procedure: PATELLA TENDON REPAIR;  Surgeon: Eric Lopez MD;  Location: [facility] OR;  Service: Orthopedics;  Laterality: Left;      Patient Active Problem List    Diagnosis Date Noted   • Hemorrhoids 2024   • Mixed hyperlipidemia 08/15/2019   • Actinic keratosis 2016     Note Last Updated: 2016     Description: Dr Otoole follows every 4-6 months     • Atopic rhinitis 2016     Note Last Updated: 2016     Description: Seasonal allergies from March through May each year     • Stenosis of carotid artery 2016     Note Last Updated: 2016     Description: [facility]  September 2012. 2.3 mm soft plaque on the left side. November 2014 normal carotid ultrasound.     • Benign prostatic hyperplasia with urinary obstruction 06/20/2016   • Depression 06/20/2016     Note Last Updated: 6/20/2016     Description: Fatigue improved with change from Zolft to Fluoxetene.     • Insomnia 06/20/2016     Note Last Updated: 6/20/2016     Description: 10/14 Responded well to Trazodone.     • Observed sleep apnea 06/20/2016     Note Last Updated: 6/20/2016     Description: Patient diagnosed with obstructive sleep apnea approximately 2012. He was advised that this was mild and he has lost weight and avoid sleeping on his back.        Past Medical History:   Diagnosis Date   • Allergic 1982   • Benign prostatic hyperplasia with urinary obstruction    • Depression    • Humerus fracture     as a child   • Hypercholesterolemia    • Mixed hyperlipidemia    • Rectal bleeding    • Seizure     had a seizure in 1999 on seizure meds short term resolved off for 20yrs meds   • Skin cancer     1999   • Stenosis of carotid artery    • Visual impairment        Subjective  Chief Complaint/Subjective Report: Patient presented to the clinic today with complaints of pain in the calves that started around 10 years ago that has bothered him off and on episodically and recently worsened in November and has not resolved much since. Pt reported a PMH of calf issues, patellar injury, and low back issues at times, see scanned and additional documents for further or additional PMH not mentioned at PT today. Pt made no reports of CNS signs or symptoms, or indications of other sinister pathologies were given in the subjective history today.   Mechanism of Injury: Unknown/Insidious  Functional Limitations: ADLs, work-related activities  Subjective Goals for PT: Return to PLOF, decreased pain with ADLs and community activities  Prior Treatment for Current Condition: MD, massage, stretching,   Imaging:See scans   Pain/VNRS  (0-10/10): Worst: 8/10; Average: 2-3/10  Agg. Factors: Running, jumping, stairs  Relieving Factors: rest  Subjective Questionnaire: LEFS: 55/80  PLOF: Independent with all functional tasks (transfers, ambulation, stair navigation, squatting, lifting, etc.), ADLs/ iADLs (bathing, dressing, house work, and other such tasks), and community activities (driving, shopping, wellness activities, etc.)   Occupation: Not referenced   PMH: See Subjective Report and scanned documentation   Precautions/Contraindications: No reported contraindications from subjective history today unless otherwise stated above.        Objective  AROM (% of Full --- * denotes degrees in place of % --- + denotes tested to be WFL)        -- Thoracic Rotation Right:  - Left:  -    -- Side Bending Right  75 Left 80    -- Lumbar Flexion:   85      -- Lumbar Extension:  80p                   AROM Lower Extremity (% of Full --- * denotes degrees in place of % --- + denotes tested to be WFL)     -- Hip Flexion Right:  + Left: +    -- Hip Extension Right: + Left: +    -- Hip ER in Seated Pos: + Left: +  -- Hip IR in Seated Pos: + Left: +  -- Knee Flexion Right:  + Left: +  -- Knee Extension Right: + Left: +    LE MMT (0-5/5 --- + denotes WFL)  -- Hip Flexion Right:  4-/5 Left: 4-/5  -- Hip Extension Right: 4/5 Left: 4/5  -- Hip Abduction Right: 4/5 Left: 4/5  -- Knee Flexion Right:  4+/5 Left: 4+/5  -- Knee Extension Right: 5-/5 Left: 5-/5  -- Ankle PF Right   4+/5 Left: 5-/5  -- Ankle DF Right  5-/5 Left: 5-/5      Functional Assessment: Impaired tolerance to flexion based lifting; + Slump and SLR on R at end range    Exercise and Interventions:  Access Code: [email or web address]  URL: [email or web address]  Date: 02/05/2025  Prepared by: Amor Gil    Exercises  - Supine Straight Leg Lumbar Rotation Stretch  - 2-3 x daily - 7 x weekly - 3 sets - 5 reps - 3 hold  - Supine Peroneal Nerve Glide  - 2-3 x daily - 7 x weekly - 2 sets - 8-10 reps  - Side  Stepping with Resistance at Ankles  - 1-2 x daily - 7 x weekly - 3-4 sets - 12-15 steps (each way)  - Single Leg RDL at Counter (with Support)  - 1 x daily - 7 x weekly - 3 sets - 8-10 reps  - Figure 4 Bridge  - 1 x daily - 7 x weekly - 3 sets - 15 reps  - Kettlebell Deadlift  - 1-2 x daily - 7 x weekly - 3 sets - 8-10 reps  - Standing Diagonal Chop  - 1 x daily - 7 x weekly - 3 sets - 10 reps  - Reverse Chop with Elbows Straight  - 1 x daily - 7 x weekly - 3 sets - 10 reps  - Modified Side Plank with Hip Abduction and Resistance  - 1 x daily - 7 x weekly - 3 sets - 10 reps  - Anti-Rotation Sidestepping with Resistance  - 1 x daily - 7 x weekly - 3 sets - 10 reps  - Ted Curl (Google it)  - 1 x daily - 7 x weekly - 3 sets - 10 reps  - Lateral Step Down  - 1 x daily - 7 x weekly - 3 sets - 10 reps  - Single Leg Squat with Chair Touch  - 1 x daily - 7 x weekly - 3 sets - 10 reps  - Therapeutic Neuroscience Education - Education on Pain Neuroscience, graded motor exposure, pain threshold, alarm system and training levels, and progressive loading - 10 mins      Documentation of Assessment Details: Patient presented the clinic with signs and symptoms consistent with lumbar radiating referred pain. Patient demonstrated limitations and impairments in neurodynamic mobility, LE strength, and activity tolerance during today's evaluation, and will benefit from skilled PT address current limitations and impairments to help patient regain functional mobility and strength necessary to return to PLOF, reduce pain, and improve current symptoms as patient progresses towards meeting current goals established at therapy today. The patient present with no comorbidities or personal factors that impact my POC and deficit in above mentioned areas. Based on these findings and results from valid tests and measures, I am classifying this patient's presentation as stable with uncomplicated characteristics, and a good prognosis for  recovery.         Assessment & Plan       Assessment  Impairments: abnormal gait, abnormal or restricted ROM, activity intolerance, impaired physical strength, lacks appropriate home exercise program and pain with function   Functional limitations: carrying objects, lifting, sleeping, walking, uncomfortable because of pain, sitting and standing   Prognosis details: Based on valid tests and measures performed today I am classifying this patient as presently stable with uncomplicated characteristics and good prognosis for recovery    Goals  Plan Goals: SHORT TERM GOALS (0-4 Weeks):  Pt will demonstrated independence and compliance with HEP within 2 weeks to demonstrate understanding of interventions and treatments helpful in reaching STG and LTG over the course of care. -- In Progress    Pt will improve LEFS and/or NANCY score by >10% within 4 weeks to demonstrate improvements in test and measure outcomes and overall functionality, and to show reduction of symptoms, improved activity tolerance, and ability to complete ADLs and work-related activities -- In Progress    Pt will improve functional mobility and ROM in the low back and hips to demonstrate improved functional capacity and independence with ADLs, driving and community participation activities. -- In Progress    Pt will reported pain <5/10 with ADLs and normal daily activities for >2 days/week over the past week to demonstrate functional improvements and activity tolerance and reduction in symptom severity -- In Progress    Pt will demonstrate improved LE and core strength for improved functional mobility and ease of transitional movements (sit to stand, kneeling to stand, squatting, etc) and stair to demonstrated improved function and progress towards LTGs. -- In Progress      LONG TERM GOALS (6-8+ Weeks)   Pt will improve tolerance to sitting, standing, and ambulating for >30mins to allow for pain free functional activities such as driving, home activities,  work-related tasks, and completing ADLs within 8 weeks to demonstrate improvements in functional independence, mobility, and community participation to allow for a return to PLOF. -- In Progress    Pt will demonstrate a >15% improvement in lumbar and hip ROMs within 8 weeks to demonstrate improvements in functional mobility and ability to complete ADLs and work-related activities Independently. -- In Progress    Pt will reported pain <3/10 with ADLs and normal daily activities for >5 days/week over the past week to demonstrate functional improvements and activity tolerance and reduction in symptom severity -- In Progress    Pt will improve NANCY and/or LEFS by >40%  over baseline to demonstrate improvements in subjective test and measure outcomes and overall functionality, and to show reduction of symptoms, improved activity tolerance, and ability to complete ADLs and work-related activities -- In Progress    Pt will be able to squat and lift an objects >20lbs without worsening of symptoms to demonstrate improvements in functional strength for ease of home and community tasks and improved functional independence. -- In Progress      Plan  Therapy options: will be seen for skilled therapy services  Planned modality interventions: dry needling, TENS, high voltage pulsed current (pain management), electrical stimulation/Russian stimulation and cryotherapy  Planned therapy interventions: ADL retraining, abdominal trunk stabilization, manual therapy, neuromuscular re-education, balance/weight-bearing training, body mechanics training, soft tissue mobilization, spinal/joint mobilization, joint mobilization, home exercise program, gait training, functional ROM exercises, strengthening, therapeutic activities, transfer training and flexibility  Treatment plan discussed with: patient  Plan details: Duration: 2-3x/Wk for 4 Weeks - Upon completion of 4 weeks further evaluation and assessment will determine ongoing plan for  continued care.    Continue with skilled physical therapy addressing previously mentioned limitations and impairments; progress HEP as tolerated; progress functional strengthening interventions to tolerance.    History # of Personal Factors and/or Comorbidities: LOW (0)  Examination of Body System(s): # of elements: LOW (1-2)  Clinical Presentation: STABLE   Clinical Decision Making: LOW       Timed:         Manual Therapy:    -     mins  70484;     Therapeutic Exercise:    15     mins  43703;     Neuromuscular Maribel:    10    mins  98714;    Therapeutic Activity:     -     mins  04840;     Gait Training:           mins  47206;     Ultrasound:          mins  17257;    Ionto                                  mins   84049  Self Care                           mins   81158  Canalith Repos         mins 49184    Un-Timed:  Electrical Stimulation:          mins  16118 ( );  Dry Needling         mins self-pay  Traction         mins 51545  Low Eval    20     Mins  77146  Mod Eval         Mins  67744  High Eval                            Mins  42822    Timed Treatment:   25   mins   Total Treatment:     45   mins      PT: Amor Gil PT     License Number: [id number]  Electronically signed by Amor Gil PT, 02/05/25, 3:43 PM EST    Certification Period: 2/7/2025 thru 5/7/2025  I certify that the therapy services are furnished while this patient is under my care.  The services outlined above are required by this patient, and will be reviewed every 90 days.         Physician Signature:__________________________________________________    PHYSICIAN: Armen Castro Jr.,   NPI: [id number]                                      DATE:      Please sign and return via fax to [email or web address] . Thank you, [facility].

## 2025-02-19 ENCOUNTER — TREATMENT (OUTPATIENT)
Dept: PHYSICAL THERAPY | Facility: CLINIC | Age: 55
End: 2025-02-19
Payer: COMMERCIAL

## 2025-02-19 DIAGNOSIS — M79.661 RIGHT CALF PAIN: Primary | ICD-10-CM

## 2025-02-19 DIAGNOSIS — R53.1 WEAKNESS GENERALIZED: ICD-10-CM

## 2025-02-19 PROCEDURE — 97112 NEUROMUSCULAR REEDUCATION: CPT | Performed by: PHYSICAL THERAPIST

## 2025-02-19 PROCEDURE — 97110 THERAPEUTIC EXERCISES: CPT | Performed by: PHYSICAL THERAPIST

## 2025-02-19 PROCEDURE — 97530 THERAPEUTIC ACTIVITIES: CPT | Performed by: PHYSICAL THERAPIST

## 2025-02-21 ENCOUNTER — TREATMENT (OUTPATIENT)
Dept: PHYSICAL THERAPY | Facility: CLINIC | Age: 55
End: 2025-02-21
Payer: COMMERCIAL

## 2025-02-21 DIAGNOSIS — R53.1 WEAKNESS GENERALIZED: ICD-10-CM

## 2025-02-21 DIAGNOSIS — M79.661 RIGHT CALF PAIN: Primary | ICD-10-CM

## 2025-02-21 PROCEDURE — 97530 THERAPEUTIC ACTIVITIES: CPT | Performed by: PHYSICAL THERAPIST

## 2025-02-21 PROCEDURE — 97112 NEUROMUSCULAR REEDUCATION: CPT | Performed by: PHYSICAL THERAPIST

## 2025-02-21 PROCEDURE — 97110 THERAPEUTIC EXERCISES: CPT | Performed by: PHYSICAL THERAPIST

## 2025-02-21 NOTE — PROGRESS NOTES
Physical Therapy Daily Note    Patient: Pedro Katz   : 1970  Diagnosis/ICD-10 Code:  Right calf pain [M79.661]  Referring practitioner: Armen Castro *  Date of Initial Visit: Type: THERAPY  Noted: 2025  Today's Date: 2025  Patient seen for 2 session  Location of Service: [place] [address]       Visit Diagnoses:    ICD-10-CM ICD-9-CM   1. Right calf pain  M79.661 729.5   2. Weakness generalized  R53.1 780.79            Subjective  Pedro Katz reported today that he is doing better, feels like things are improving    Objective   No functional measures updated at today's visit unless otherwise stated. For functional assessment and documentation of patient progressions referred to the assessment section.    See Exercise, Manual, and Modality Logs for complete treatments.       Assessment/Plan  Started treatment today with dynamic warm-up to prepare pt for interventions; tx today continued with end range mobility, neurodynamic mobility, and functional strengthening, which pt tolerated well and without complaint. Pt continues to have complaints in the aforementioned area, and continues to display limitations and impairments previously noted; pt will continue to benefit from ongoing skilled PT to help pt continue to progress towards current LTGs and return to PLOF.    Plan: Continue with current treatment plan and progressions to reach goals established and previous evaluation/assessment         Timed:         Manual Therapy:         mins  32958;     Therapeutic Exercise:    25     mins  07491;     Neuromuscular Maribel:    10    mins  00318;    Therapeutic Activity:     10     mins  83150;     Gait Training:           mins  64587;     Ultrasound:          mins  07163;    Ionto                                   mins   13856  Self Care                            mins   26066  Canalith Repos         mins  89752    Un-Timed:  Electrical Stimulation:         mins  48426 ( );  Dry Needling          mins self-pay  Traction          mins 88135  Low Eval          Mins  01116  Mod Eval          Mins  76845  High Eval                            Mins  95338      Timed Treatment:   45   mins   Total Treatment:     45   mins      PT: Amor Gil PT     License Number: [id number]  Electronically signed by Amor Gil PT, 02/21/25, 7:15 AM EST

## 2025-02-25 ENCOUNTER — TREATMENT (OUTPATIENT)
Dept: PHYSICAL THERAPY | Facility: CLINIC | Age: 55
End: 2025-02-25
Payer: COMMERCIAL

## 2025-02-25 DIAGNOSIS — R53.1 WEAKNESS GENERALIZED: ICD-10-CM

## 2025-02-25 DIAGNOSIS — M79.661 RIGHT CALF PAIN: Primary | ICD-10-CM

## 2025-02-25 PROCEDURE — 97112 NEUROMUSCULAR REEDUCATION: CPT | Performed by: PHYSICAL THERAPIST

## 2025-02-25 PROCEDURE — 97110 THERAPEUTIC EXERCISES: CPT | Performed by: PHYSICAL THERAPIST

## 2025-02-25 PROCEDURE — 97530 THERAPEUTIC ACTIVITIES: CPT | Performed by: PHYSICAL THERAPIST

## 2025-02-25 NOTE — PROGRESS NOTES
Physical Therapy Daily Note    Patient: Pedro Katz   : 1970  Diagnosis/ICD-10 Code:  Right calf pain [M79.661]  Referring practitioner: Armen Castro *  Date of Initial Visit: Type: THERAPY  Noted: 2025  Today's Date: 2025  Patient seen for 3 session  Location of Service: [address] [place] [address]       Visit Diagnoses:    ICD-10-CM ICD-9-CM   1. Right calf pain  M79.661 729.5   2. Weakness generalized  R53.1 780.79            Subjective  Pedro Katz reported today that he feels like things are improving. Still having a number of issues, but feels like it's going in the right direction    Objective   No functional measures updated at today's visit unless otherwise stated. For functional assessment and documentation of patient progressions referred to the assessment section.    See Exercise, Manual, and Modality Logs for complete treatments.       Assessment/Plan  Treatment today continued with progressive strengthening to tolerance and neurodynamic mobility to help with pain and sensitization. Pt tolerated treatment well today with rest intervals added throughout as needed to allow for recovery; pt continues to have difficulty with single leg stability and loading of the calf, but appears to be making improvements each session. Pt continues to have limitations in functional strength and mobility, and will continue to benefit from ongoing skilled PT to help pt continue to progress towards current LTGs and return to PLOF.    Plan: Continue with current treatment plan and progressions to reach goals established and previous evaluation/assessment         Timed:         Manual Therapy:         mins  01876;     Therapeutic Exercise:    25     mins  29690;     Neuromuscular Maribel:    10    mins  65107;    Therapeutic Activity:     10     mins  25674;     Gait Training:           mins  01276;     Ultrasound:          mins  00617;     Ionto                                   mins   29563  Self Care                            mins   65381  Canalith Repos         mins 32513    Un-Timed:  Electrical Stimulation:         mins  26976 ( );  Dry Needling          mins self-pay  Traction          mins 83321  Low Eval          Mins  99863  Mod Eval          Mins  20707  High Eval                            Mins  06005      Timed Treatment:   45   mins   Total Treatment:     45   mins      PT: Amor Gil PT     License Number: [id number]  Electronically signed by Amor Gil PT, 02/25/25, 4:52 PM EST

## 2025-02-27 NOTE — PROGRESS NOTES
Physical Therapy Daily Note    Patient: Pedro Katz   : 1970  Diagnosis/ICD-10 Code:  Right calf pain [M79.661]  Referring practitioner: Armen Castro *  Date of Initial Visit: Type: THERAPY  Noted: 2025  Today's Date: 2025  Patient seen for 4 session  Location of Service: [place] [address]       Visit Diagnoses:    ICD-10-CM ICD-9-CM   1. Right calf pain  M79.661 729.5   2. Weakness generalized  R53.1 780.79            Subjective  Pedro Katz reported today that he is doing better, feels like things are improving    Objective   No functional measures updated at today's visit unless otherwise stated. For functional assessment and documentation of patient progressions referred to the assessment section.    See Exercise, Manual, and Modality Logs for complete treatments.       Assessment/Plan  Started treatment today with dynamic warm-up to prepare pt for interventions; tx today continued with end range mobility, neurodynamic mobility, and functional strengthening, which pt tolerated well and without complaint. Pt continues to have complaints in the aforementioned area, and continues to display limitations and impairments previously noted; pt will continue to benefit from ongoing skilled PT to help pt continue to progress towards current LTGs and return to PLOF.    Plan: Continue with current treatment plan and progressions to reach goals established and previous evaluation/assessment         Timed:         Manual Therapy:         mins  05506;     Therapeutic Exercise:    25     mins  15606;     Neuromuscular Maribel:    15    mins  67967;    Therapeutic Activity:     15    mins  37469;     Gait Training:           mins  98309;     Ultrasound:          mins  93106;    Ionto                                   mins   14191  Self Care                            mins   67754  Canalith Repos         mins  42176    Un-Timed:  Electrical Stimulation:         mins  60063 ( );  Dry Needling          mins self-pay  Traction          mins 26299  Low Eval          Mins  02108  Mod Eval          Mins  00259  High Eval                            Mins  91337      Timed Treatment:   55   mins   Total Treatment:     55   mins      PT: Amor Gil PT     License Number: [id number]  Electronically signed by Amor Gil PT, 02/27/25, 1:10 PM EST

## 2025-07-16 DIAGNOSIS — F34.1 DYSTHYMIA: ICD-10-CM

## 2025-07-16 RX ORDER — FLUOXETINE HYDROCHLORIDE 40 MG/1
40 CAPSULE ORAL DAILY
Qty: 90 CAPSULE | Refills: 3 | Status: SHIPPED | OUTPATIENT
Start: 2025-07-16

## 2025-07-22 DIAGNOSIS — G47.00 INSOMNIA, UNSPECIFIED TYPE: ICD-10-CM

## 2025-07-22 RX ORDER — TRAZODONE HYDROCHLORIDE 50 MG/1
50 TABLET ORAL NIGHTLY
Qty: 90 TABLET | Refills: 3 | Status: SHIPPED | OUTPATIENT
Start: 2025-07-22

## (undated) DEVICE — ST TB GOFLO STRL

## (undated) DEVICE — SKIN PREP TRAY W/CHG: Brand: MEDLINE INDUSTRIES, INC.

## (undated) DEVICE — PK BASIC ORTHO 90

## (undated) DEVICE — PREP SOL POVIDONE/IODINE BT 4OZ

## (undated) DEVICE — PATIENT RETURN ELECTRODE, SINGLE-USE, CONTACT QUALITY MONITORING, ADULT, WITH 9FT CORD, FOR PATIENTS WEIGING OVER 33LBS. (15KG): Brand: MEGADYNE

## (undated) DEVICE — SYR LL TP 10ML STRL

## (undated) DEVICE — CLEAR-TRAC DISPOSABLE STOPCOCK: Brand: CLEAR-TRAC

## (undated) DEVICE — TUBING, SUCTION, 1/4" X 10', STRAIGHT: Brand: MEDLINE

## (undated) DEVICE — TRAP FLD MINIVAC MEGADYNE 100ML

## (undated) DEVICE — DRAPE,REIN 53X77,STERILE: Brand: MEDLINE

## (undated) DEVICE — DRSNG PAD ABD 8X10IN STRL

## (undated) DEVICE — STPLR SKIN VISISTAT WD 35CT

## (undated) DEVICE — DRP C/ARM 41X74IN

## (undated) DEVICE — LOU MINOR PROCEDURE: Brand: MEDLINE INDUSTRIES, INC.

## (undated) DEVICE — NDL SPINE 22G 31/2IN BLK

## (undated) DEVICE — 4.5 MM CONCAVE, FULL RADIUS,                                    CURVE, CURVED BLADES, POWER/EP-1,                                    YELLOW, CURVE LENGHTS 17 MM,                                    PACKAGED 6 PER BOX, STERILE

## (undated) DEVICE — SUT MNCRYL 3/0 PS2 27IN Y427H

## (undated) DEVICE — ENSEAL TRIO TEMPERATURE CONTOLLED TISSUE SEALING TECHNOLOGY DISPOSABLE TISSUE SEALING DEVICE TAPTRONIC TRIGGER ACTIVATED POWER 3MM CURVED JAW: Brand: ENSEAL

## (undated) DEVICE — 3M™ IOBAN™ 2 ANTIMICROBIAL INCISE DRAPE 6650EZ: Brand: IOBAN™ 2

## (undated) DEVICE — IRRIGATOR BULB ASEPTO 60CC STRL

## (undated) DEVICE — STRAP STIRUP WO/ RNG

## (undated) DEVICE — CANN O2 ETCO2 FITS ALL CONN CO2 SMPL A/ 7IN DISP LF

## (undated) DEVICE — SUT ETHLN 3/0 PS2 18 IN 1669H

## (undated) DEVICE — SYR CONTRL LUERLOK 10CC

## (undated) DEVICE — Device

## (undated) DEVICE — TOWEL,OR,DSP,ST,BLUE,STD,4/PK,20PK/CS: Brand: MEDLINE

## (undated) DEVICE — APPL CHLORAPREP HI/LITE 26ML ORNG

## (undated) DEVICE — BOWL PLSTC LG 32OZ BLU STRL

## (undated) DEVICE — SUT GUT CHRM 3/0 SH 36IN G172H

## (undated) DEVICE — WEBRIL* CAST PADDING: Brand: DEROYAL

## (undated) DEVICE — NDL HYPO PRECISIONGLIDE REG 25G 1 1/2

## (undated) DEVICE — DRSNG GZ PETROLTM XEROFORM CURAD 1X8IN STRL

## (undated) DEVICE — GLV SURG SENSICARE PI PF LF 7 GRN STRL

## (undated) DEVICE — STCKNT IMPERV 12IN STRL

## (undated) DEVICE — SUT VIC 2/0 CP2 CR8 18IN J762D

## (undated) DEVICE — ELECTRD BLD EZ CLN MOD XLNG 2.75IN

## (undated) DEVICE — DISPOSABLE KIT, HIP FOR 1.8MM                                    Q-FIX IMPLANT, INCLUDES DRILL, DRILL                                    GUIDE AND OBTURATOR: Brand: Q-FIX

## (undated) DEVICE — LAG ARTHROSCOPY: Brand: MEDLINE INDUSTRIES, INC.

## (undated) DEVICE — ADAPT DB SPIKE 2PCT FOR AR6400 TBG

## (undated) DEVICE — SENSR O2 OXIMAX FNGR A/ 18IN NONSTR

## (undated) DEVICE — LEGGINGS, PAIR, CLEAR, STERILE: Brand: MEDLINE

## (undated) DEVICE — THE SINGLE USE ETRAP – POLYP TRAP IS USED FOR SUCTION RETRIEVAL OF ENDOSCOPICALLY REMOVED POLYPS.: Brand: ETRAP

## (undated) DEVICE — ADAPT CLN BIOGUARD AIR/H2O DISP

## (undated) DEVICE — BNDG ESMARK 6INX9FT STRL

## (undated) DEVICE — JELLY,LUBE,STERILE,FLIP TOP,TUBE,4-OZ: Brand: MEDLINE

## (undated) DEVICE — SNAR POLYP CAPTIVATOR RND STFF 2.4 240CM 10MM 1P/U

## (undated) DEVICE — TRANSPOSAL ULTRAFLEX DUO/QUAD ULTRA CART MANIFOLD

## (undated) DEVICE — PENCL E/S ULTRAVAC TELESCP NOSE HOLSTR 10FT

## (undated) DEVICE — ACTIVE WRAP®, KNEE/LEG: Brand: DEROYAL

## (undated) DEVICE — GLV SURG NEOLON 2G PF LF 7.5 STRL

## (undated) DEVICE — DISPOSABLE TOURNIQUET CUFF SINGLE BLADDER, SINGLE PORT AND LUER LOCK CONNECTOR: Brand: COLOR CUFF

## (undated) DEVICE — TUBING, SUCTION, 3/16" X 10', STRAIGHT: Brand: MEDLINE

## (undated) DEVICE — GLV SURG BIOGEL M LTX PF 6 1/2

## (undated) DEVICE — DECANTER: Brand: UNBRANDED

## (undated) DEVICE — 3M™ STERI-DRAPE™ U-DRAPE 1015: Brand: STERI-DRAPE™

## (undated) DEVICE — DRP C/ARMOR

## (undated) DEVICE — DRSNG SURESITE WNDW 4X4.5

## (undated) DEVICE — SUT VIC 0 0S6 CR3 18IN J754T

## (undated) DEVICE — NDL HYPO SFTY GLD 22G 1 1/2IN

## (undated) DEVICE — MAT FLR ABSORBENT LG 4FT 10 2.5FT

## (undated) DEVICE — LN SMPL CO2 SHTRM SD STREAM W/M LUER

## (undated) DEVICE — GLV SURG SENSICARE PI LF PF 7.0

## (undated) DEVICE — UNDRGLV SURG BIOGEL PUNCTUREINDICATION SZ7 PF STRL

## (undated) DEVICE — KT ORCA ORCAPOD DISP STRL

## (undated) DEVICE — WEREWOLF FLOW 50 COBLATION WAND: Brand: COBLATION

## (undated) DEVICE — SKIN PREP TRAY 4 COMPARTM TRAY: Brand: MEDLINE INDUSTRIES, INC.

## (undated) DEVICE — SOL ISO/ALC RUB 70PCT 4OZ

## (undated) DEVICE — SPNG GZ WOVN 4X4IN 12PLY 10/BX STRL